# Patient Record
Sex: FEMALE | Race: WHITE | NOT HISPANIC OR LATINO | Employment: UNEMPLOYED | URBAN - METROPOLITAN AREA
[De-identification: names, ages, dates, MRNs, and addresses within clinical notes are randomized per-mention and may not be internally consistent; named-entity substitution may affect disease eponyms.]

---

## 2017-01-09 ENCOUNTER — PATIENT MESSAGE (OUTPATIENT)
Dept: RHEUMATOLOGY | Facility: CLINIC | Age: 47
End: 2017-01-09

## 2017-01-19 ENCOUNTER — PATIENT MESSAGE (OUTPATIENT)
Dept: RHEUMATOLOGY | Facility: CLINIC | Age: 47
End: 2017-01-19

## 2017-01-30 ENCOUNTER — HOSPITAL ENCOUNTER (OUTPATIENT)
Dept: RADIOLOGY | Facility: HOSPITAL | Age: 47
Discharge: HOME | End: 2017-01-30
Attending: PODIATRIST
Payer: COMMERCIAL

## 2017-01-30 ENCOUNTER — TELEPHONE (OUTPATIENT)
Dept: PODIATRY | Facility: CLINIC | Age: 47
End: 2017-01-30

## 2017-01-30 ENCOUNTER — OFFICE VISIT (OUTPATIENT)
Dept: PODIATRY | Facility: CLINIC | Age: 47
End: 2017-01-30
Payer: COMMERCIAL

## 2017-01-30 VITALS — BODY MASS INDEX: 34.65 KG/M2 | HEIGHT: 63 IN | WEIGHT: 195.56 LBS

## 2017-01-30 DIAGNOSIS — M77.8 EXTENSOR TENDINITIS OF FOOT: ICD-10-CM

## 2017-01-30 DIAGNOSIS — M79.671 FOOT PAIN, RIGHT: ICD-10-CM

## 2017-01-30 DIAGNOSIS — M72.2 PLANTAR FASCIITIS: ICD-10-CM

## 2017-01-30 DIAGNOSIS — M79.671 FOOT PAIN, RIGHT: Primary | ICD-10-CM

## 2017-01-30 DIAGNOSIS — M21.6X9 EQUINUS DEFORMITY OF FOOT: ICD-10-CM

## 2017-01-30 DIAGNOSIS — M84.374K: ICD-10-CM

## 2017-01-30 PROCEDURE — 99204 OFFICE O/P NEW MOD 45 MIN: CPT | Mod: S$GLB,,, | Performed by: PODIATRIST

## 2017-01-30 PROCEDURE — 73630 X-RAY EXAM OF FOOT: CPT | Mod: 26,RT,, | Performed by: RADIOLOGY

## 2017-01-30 PROCEDURE — 73630 X-RAY EXAM OF FOOT: CPT | Mod: TC,PO,RT

## 2017-01-30 PROCEDURE — 99999 PR PBB SHADOW E&M-EST. PATIENT-LVL III: CPT | Mod: PBBFAC,,, | Performed by: PODIATRIST

## 2017-01-30 PROCEDURE — 1159F MED LIST DOCD IN RCRD: CPT | Mod: S$GLB,,, | Performed by: PODIATRIST

## 2017-01-30 RX ORDER — TOPIRAMATE 25 MG/1
TABLET ORAL
Qty: 180 TABLET | Refills: 0 | Status: SHIPPED | OUTPATIENT
Start: 2017-01-30 | End: 2017-07-19 | Stop reason: SDUPTHER

## 2017-01-30 RX ORDER — NAPROXEN 500 MG/1
500 TABLET ORAL 2 TIMES DAILY
Qty: 30 TABLET | Refills: 1 | Status: ON HOLD | OUTPATIENT
Start: 2017-01-30 | End: 2017-04-20 | Stop reason: HOSPADM

## 2017-01-30 RX ORDER — METHYLPREDNISOLONE 4 MG/1
TABLET ORAL
Qty: 1 PACKAGE | Refills: 0 | Status: SHIPPED | OUTPATIENT
Start: 2017-01-30 | End: 2017-02-16 | Stop reason: ALTCHOICE

## 2017-01-30 NOTE — MR AVS SNAPSHOT
[facility] Podiatry  [address]  [facility] [address]  Phone: [phone]                  Kayla Small   2017 7:00 AM   Office Visit    Description:  Female : 1970   Provider:  Darrell Alvarado DPM   Department:  [place] [facility] Podiatry           Reason for Visit     Foot Pain           Diagnoses this Visit        Comments    Foot pain, right    -  Primary     Extensor tendinitis of foot         Plantar fasciitis         Equinus deformity of foot                To Do List           Future Appointments        Provider Department Dept Phone    3/13/2017 7:00 AM Darrell Alvarado DPM [place] [facility] Pod[facility] [phone]    3/28/2017 8:00 AM Luke Bianchi MD [facility] Rheumatology [phone]      Goals (5 Years of Data)     None      Follow-Up and Disposition     Return in about 6 weeks (around 3/13/2017).       These Medications        Disp Refills Start End    naproxen (NAPROSYN) 500 MG tablet 30 tablet 1 2017     Take 1 tablet (500 mg total) by mouth 2 (two) times daily. - Oral    Pharmacy: [facility] [address] [place] [address] AT [facility] [address] Ph #: [phone]       methylPREDNISolone (MEDROL DOSEPACK) 4 mg tablet 1 Package 0 2017    use as directed    Pharmacy: [facility] [address] [place] [address] AT [facility] [address] [facility] [address] Ph #: [phone]         [facility] On [facility]     [facility] Care [facility] -  Assistance  Registered nurses in the [facility] provide clinical advisement, health education, appointment booking, and other advisory services.  Call for this free service at [phone].             Medications           Message regarding Medications     Verify the changes and/or additions to your medication regime listed below are the same as discussed with your clinician today.  If any of these changes or additions are incorrect, please notify your healthcare provider.         START taking these NEW medications        Refills    naproxen (NAPROSYN) 500 MG tablet 1    Sig: Take 1 tablet (500 mg total) by mouth 2 (two) times daily.    Class: Normal    Route: Oral    methylPREDNISolone (MEDROL DOSEPACK) 4 mg tablet 0    Sig: use as directed    Class: Normal           Verify that the below list of medications is an accurate representation of the medications you are currently taking.  If none reported, the list may be blank. If incorrect, please contact your healthcare provider. Carry this list with you in case of emergency.           Current Medications     ACTHAR H.P. 80 unit/mL injectable gel INJECT 80 UNITS (1ML) SUBCUTANEOUSLY 2 TIMES A WEEK FOR 12 WEEKS    alprazolam (XANAX) 0.5 MG tablet TAKE 1 TABLET BY MOUTH EVERY DAY    armodafinil (NUVIGIL) 250 mg tablet TK 1 T PO  QD    baclofen (LIORESAL) 20 MG tablet 1 tab po TID prn muscle spasm    buPROPion (WELLBUTRIN SR) 150 MG TBSR 12 hr tablet     celecoxib (CELEBREX) 200 MG capsule Take 1 capsule (200 mg total) by mouth once daily.    CONTRAVE 8-90 mg TbSR     cyanocobalamin 1,000 mcg/mL injection Inject 1 mL (1,000 mcg total) into the skin every 7 days.    donepezil (ARICEPT) 5 MG tablet TAKE 1 TABLET(5 MG) BY MOUTH EVERY EVENING    DUEXIS 800-26.6 mg Tab Take 1 tablet by mouth Daily.    esomeprazole (NEXIUM) 40 MG capsule Take 1 capsule (40 mg total) by mouth as needed.    esterified estrogens (MENEST) 1.25 mg Tab Take 1 tablet by mouth once daily.    fluconazole (DIFLUCAN) 200 MG Tab TAKE ONE TABLET BY MOUTH EVERY DAY    folic acid (FOLVITE) 1 MG tablet TAKE 1 TABLET(1 MG) BY MOUTH EVERY DAY    furosemide (LASIX) 20 MG tablet Take 1 tablet (20 mg total) by mouth 2 (two) times daily.    gabapentin (NEURONTIN) 300 MG capsule TAKE 1 CAPSULE(300 MG) BY MOUTH THREE TIMES DAILY    hydrocodone-acetaminophen 10-325mg (NORCO)  mg Tab Take 1 tablet by mouth every 6 (six) hours as needed.    hydrOXYzine pamoate (VISTARIL) 25 MG Cap Take by  "mouth. 1 Capsule Oral As directed.  as needed    levothyroxine (SYNTHROID) 150 MCG tablet BRAND ONLY PER PT take one po daily except Saturday and Sunday 175 mg    liothyronine (CYTOMEL) 5 MCG Tab Take 25 mcg by mouth before breakfast.    meloxicam (MOBIC) 15 MG tablet     methotrexate 2.5 MG Tab Take 5 tablets (12.5 mg total) by mouth every Wednesday.    methyltestosterone 10 mg Tab Take 1/4 tab daily. This is 2.5 mg daily.    metOLazone (ZAROXOLYN) 2.5 MG tablet     ondansetron (ZOFRAN) 8 MG tablet TAKE 1 TABLET BY MOUTH EVERY 12 HOURS AS NEEDED FOR NAUSEA    PLEGRIDY 125 mcg/0.5 mL PnIj     potassium chloride (K-TAB) 20 mEq Take 1 tablet (20 mEq total) by mouth 2 (two) times daily.    pramipexole (MIRAPEX) 0.5 MG tablet TAKE 1 TO 3 TABLETS BY MOUTH AT BEDTIME TO TREAT RESTLESS LEG    pramipexole (MIRAPEX) 0.5 MG tablet TAKE 1 TO 3 TABLETS BY MOUTH AT BEDTIME TO TREAT RESTLESS LEG    ranitidine (ZANTAC) 150 MG tablet     RESTASIS 0.05 % ophthalmic emulsion     ropinirole (REQUIP) 1 MG tablet TK 1 T PO QD AT BEDTIME. START WITH 1/2 T AND. INCREASE TO 1 T AFTER 3 DAYS FOR 30 DAYS    sumatriptan (IMITREX) 100 MG tablet TAKE 1 TABLET BY MOUTH AS NEEDED FOR MIGRAINE; NO MORE THAN 200MG IN 24 HOURS    SYNTHROID 175 mcg tablet Take one po every Saturday and Sunday BRAND NAME ONLY    temazepam (RESTORIL) 30 mg capsule Take 30 mg by mouth nightly as needed for Insomnia.    topiramate (TOPAMAX) 25 MG tablet TAKE 1 TABLET(25 MG) BY MOUTH TWICE DAILY    amoxicillin-clavulanate 875-125mg (AUGMENTIN) 875-125 mg per tablet     methylPREDNISolone (MEDROL DOSEPACK) 4 mg tablet use as directed    naproxen (NAPROSYN) 500 MG tablet Take 1 tablet (500 mg total) by mouth 2 (two) times daily.           Clinical Reference Information           Vital Signs - Last Recorded  Most recent update: 1/30/2017  7:25 AM by Magi Garrison LPN    Ht Wt BMI          5' 3" (1.6 m) 88.7 kg (195 lb 8.8 oz) 34.64 kg/m2        Allergies as of 1/30/2017  "    No Known Allergies      Immunizations Administered on Date of Encounter - 1/30/2017     None      Orders Placed During Today's Visit     Future Labs/Procedures Expected by Expires    X-Ray Foot Complete Right  1/30/2017 1/30/2018      Instructions    - Given verbal and written instructions regarding stretching exercises to help reduce equinus deformity.    - Recommended wearing supportive shoes and insoles to offload the affected heel.    - Insoles: Sof Sole Fit Series High Arch (found on amazon.com).    - Rest and ice the affected heel and foot up to 20 minutes daily.      - Instructed to take naproxen and medrol dose pack for pain.    - Avoid barefoot walking, flats, and slippers as this will exacerbate symptoms.    - Avoid squatting, stooping, and running as these activities will exacerbate symptoms.

## 2017-01-30 NOTE — PROGRESS NOTES
Subjective:      Patient ID: Kayla Small is a 46 y.o. female.    Chief Complaint: Foot Pain (Bilateral foot pain, Hx. Fx to Rt foot)  Patient presents to clinic with the complaint of pain in the Rt. Foot over 8 months ago.  States that she was previously diagnosed with a hairline fracture of the 2nd metatarsal at that time.  States that she was instructed to wear a postoperative boot to offload the site, however, states this was short lived due to difficulty walking with the boot.  States the foot continues to emit sharp pain with ambulation that she rates as an 8/10.  States symptoms are alleviated only with rest.  Has not attempted any additional self treatment.  Also, relates having more recent pain in the Lt. Plantar proximal arch and heel.  States symptoms are sharp and exacerbated with weight bearing.  Symptoms are alleviated with rest.  Feels that she is compensating with her weight being shifted to the Lt. On account of current Rt. Foot pain.  Denies any additional pedal complaints.      Past Medical History   Diagnosis Date    Anemia     Cancer      Thyroid    Multiple sclerosis     Psoriatic arthritis     Thyroid cancer        Past Surgical History   Procedure Laterality Date    Breast augmentation       R and L breast  removed    Thyroid surgery      Tumor removed behind l ear       section, classic       x 2    Colonoscopy      Hysterectomy       TVH with BSO, anemia postoperatively       Family History   Problem Relation Age of Onset    Heart disease Mother     Diabetes Brother     Breast cancer Neg Hx     Colon cancer Neg Hx        Social History     Social History    Marital status:      Spouse name: N/A    Number of children: N/A    Years of education: N/A     Social History Main Topics    Smoking status: Never Smoker    Smokeless tobacco: Never Used    Alcohol use Yes      Comment: socially    Drug use: No    Sexual activity: Yes     Partners: Male      Birth control/ protection: Surgical     Other Topics Concern    None     Social History Narrative       Current Outpatient Prescriptions   Medication Sig Dispense Refill    ACTHAR H.P. 80 unit/mL injectable gel INJECT 80 UNITS (1ML) SUBCUTANEOUSLY 2 TIMES A WEEK FOR 12 WEEKS 10 mL 5    alprazolam (XANAX) 0.5 MG tablet TAKE 1 TABLET BY MOUTH EVERY DAY (Patient taking differently: TAKE 1 TABLET BY MOUTH THREE TIMES DAILY AS NEEDED) 30 tablet 0    armodafinil (NUVIGIL) 250 mg tablet TK 1 T PO  QD  5    baclofen (LIORESAL) 20 MG tablet 1 tab po TID prn muscle spasm 90 tablet 5    buPROPion (WELLBUTRIN SR) 150 MG TBSR 12 hr tablet       celecoxib (CELEBREX) 200 MG capsule Take 1 capsule (200 mg total) by mouth once daily. 30 capsule 6    CONTRAVE 8-90 mg TbSR       cyanocobalamin 1,000 mcg/mL injection Inject 1 mL (1,000 mcg total) into the skin every 7 days. 10 mL 3    donepezil (ARICEPT) 5 MG tablet TAKE 1 TABLET(5 MG) BY MOUTH EVERY EVENING 30 tablet 3    DUEXIS 800-26.6 mg Tab Take 1 tablet by mouth Daily.      esomeprazole (NEXIUM) 40 MG capsule Take 1 capsule (40 mg total) by mouth as needed. 30 capsule 5    esterified estrogens (MENEST) 1.25 mg Tab Take 1 tablet by mouth once daily. 30 each 6    fluconazole (DIFLUCAN) 200 MG Tab TAKE ONE TABLET BY MOUTH EVERY DAY 10 tablet 3    folic acid (FOLVITE) 1 MG tablet TAKE 1 TABLET(1 MG) BY MOUTH EVERY DAY 90 tablet 2    furosemide (LASIX) 20 MG tablet Take 1 tablet (20 mg total) by mouth 2 (two) times daily. 60 tablet 3    gabapentin (NEURONTIN) 300 MG capsule TAKE 1 CAPSULE(300 MG) BY MOUTH THREE TIMES DAILY 270 capsule 6    hydrocodone-acetaminophen 10-325mg (NORCO)  mg Tab Take 1 tablet by mouth every 6 (six) hours as needed. 12 tablet 0    hydrOXYzine pamoate (VISTARIL) 25 MG Cap Take by mouth. 1 Capsule Oral As directed.  as needed      levothyroxine (SYNTHROID) 150 MCG tablet BRAND ONLY PER PT take one po daily except Saturday and Sunday  175 mg      liothyronine (CYTOMEL) 5 MCG Tab Take 25 mcg by mouth before breakfast.      meloxicam (MOBIC) 15 MG tablet       methotrexate 2.5 MG Tab Take 5 tablets (12.5 mg total) by mouth every Wednesday. 20 tablet 5    methyltestosterone 10 mg Tab Take 1/4 tab daily. This is 2.5 mg daily. 30 each 5    metOLazone (ZAROXOLYN) 2.5 MG tablet       ondansetron (ZOFRAN) 8 MG tablet TAKE 1 TABLET BY MOUTH EVERY 12 HOURS AS NEEDED FOR NAUSEA 30 tablet 3    PLEGRIDY 125 mcg/0.5 mL PnIj       potassium chloride (K-TAB) 20 mEq Take 1 tablet (20 mEq total) by mouth 2 (two) times daily. 60 tablet 5    pramipexole (MIRAPEX) 0.5 MG tablet TAKE 1 TO 3 TABLETS BY MOUTH AT BEDTIME TO TREAT RESTLESS LEG 90 tablet 6    pramipexole (MIRAPEX) 0.5 MG tablet TAKE 1 TO 3 TABLETS BY MOUTH AT BEDTIME TO TREAT RESTLESS LEG 90 tablet 0    ranitidine (ZANTAC) 150 MG tablet       RESTASIS 0.05 % ophthalmic emulsion       ropinirole (REQUIP) 1 MG tablet TK 1 T PO QD AT BEDTIME. START WITH 1/2 T AND. INCREASE TO 1 T AFTER 3 DAYS FOR 30 DAYS  5    sumatriptan (IMITREX) 100 MG tablet TAKE 1 TABLET BY MOUTH AS NEEDED FOR MIGRAINE; NO MORE THAN 200MG IN 24 HOURS 18 tablet 6    SYNTHROID 175 mcg tablet Take one po every Saturday and Sunday BRAND NAME ONLY  6    temazepam (RESTORIL) 30 mg capsule Take 30 mg by mouth nightly as needed for Insomnia.      topiramate (TOPAMAX) 25 MG tablet TAKE 1 TABLET(25 MG) BY MOUTH TWICE DAILY 180 tablet 6    amoxicillin-clavulanate 875-125mg (AUGMENTIN) 875-125 mg per tablet       methylPREDNISolone (MEDROL DOSEPACK) 4 mg tablet use as directed 1 Package 0    naproxen (NAPROSYN) 500 MG tablet Take 1 tablet (500 mg total) by mouth 2 (two) times daily. 30 tablet 1     No current facility-administered medications for this visit.        Review of patient's allergies indicates:   Allergen Reactions    No known allergies        Review of Systems   Constitution: Negative for chills and fever.    Cardiovascular: Negative for claudication.   Skin: Negative for color change, dry skin and nail changes.   Musculoskeletal: Positive for joint swelling. Negative for joint pain.   Neurological: Negative for numbness and paresthesias.   Psychiatric/Behavioral: Negative for altered mental status.           Objective:      Physical Exam   Constitutional: She is oriented to person, place, and time. She appears well-developed and well-nourished. No distress.   Cardiovascular:   Pulses:       Dorsalis pedis pulses are 2+ on the right side, and 2+ on the left side.        Posterior tibial pulses are 2+ on the right side, and 2+ on the left side.   CFT <3 seconds bilateral.  Pedal hair growth present bilateral.   No varicosities noted bilateral.  Mild localized edema noted to the Rt. Foot.   Musculoskeletal: She exhibits edema and tenderness.   Muscle strength 5/5 in all muscle groups bilateral.  Mild pain with palpation of the Rt. 2nd extensor tendon as it courses from the midfoot to the base of the 2nd toe.  Pain with active and passive flexion and extension of the toes of the Rt. Foot.  Pain with palpation of the medial calcaneal tubercle of the Lt. Foot.  Bilateral gastrocnemius equinus.  Bilateral pes cavus foot type.  No pain or crepitation with active or passive ROM about all foot and ankle joints.     Neurological: She is alert and oriented to person, place, and time. She has normal strength. No sensory deficit.   Light touch intact bilateral.    Skin: Skin is warm, dry and intact. No abrasion, no bruising, no burn, no ecchymosis, no laceration, no lesion, no petechiae and no rash noted. She is not diaphoretic. No cyanosis or erythema. No pallor. Nails show no clubbing.   Pedal skin has normal turgor, temperature, and texture bilateral.  Toenails x 10 appear normotrophic. Examination of the skin reveals no evidence of significant maceration, rashes, open lesions, suspicious appearing nevi or other concerning  lesions.              Assessment:       Encounter Diagnoses   Name Primary?    Foot pain, right Yes    Extensor tendinitis of foot     Plantar fasciitis     Equinus deformity of foot     Metatarsal stress fracture with nonunion, right          Plan:       Kayla HAYWARD was seen today for foot pain.    Diagnoses and all orders for this visit:    Foot pain, right  -     X-Ray Foot Complete Right; Future    Extensor tendinitis of foot  -     naproxen (NAPROSYN) 500 MG tablet; Take 1 tablet (500 mg total) by mouth 2 (two) times daily.  -     methylPREDNISolone (MEDROL DOSEPACK) 4 mg tablet; use as directed    Plantar fasciitis  -     naproxen (NAPROSYN) 500 MG tablet; Take 1 tablet (500 mg total) by mouth 2 (two) times daily.  -     methylPREDNISolone (MEDROL DOSEPACK) 4 mg tablet; use as directed    Equinus deformity of foot    Metatarsal stress fracture with nonunion, right      I counseled the patient on her conditions, their implications and medical management.    Discussed more supportive insoles for pes cavus foot type.  This will help to address plantar fasciitis on the Lt.    Instructed to begin performing stretching exercises twice daily to reduce the Lt. Equinus.     Advised to take the oral nsaid for bilateral foot pain.    Advised to RICE the Rt. Foot.    Will dispense a postoperative shoe to offload the fracture site.      Advised to ambulate minimally on the Rt. Foot to facilitate fracture healing.    Advised to avoid high impact activities as this could further disrupt fracture site.    RTC pending availability of a bone stimulator.    Darrell Alvarado DPM

## 2017-01-30 NOTE — PATIENT INSTRUCTIONS
- Given verbal and written instructions regarding stretching exercises to help reduce equinus deformity.    - Recommended wearing supportive shoes and insoles to offload the affected heel.    - Insoles: Sof Sole Fit Series High Arch (found on amazon.com).    - Rest and ice the affected heel and foot up to 20 minutes daily.      - Instructed to take naproxen and medrol dose pack for pain.    - Avoid barefoot walking, flats, and slippers as this will exacerbate symptoms.    - Avoid squatting, stooping, and running as these activities will exacerbate symptoms.

## 2017-01-31 ENCOUNTER — PATIENT MESSAGE (OUTPATIENT)
Dept: RHEUMATOLOGY | Facility: CLINIC | Age: 47
End: 2017-01-31

## 2017-02-01 ENCOUNTER — PATIENT MESSAGE (OUTPATIENT)
Dept: PODIATRY | Facility: CLINIC | Age: 47
End: 2017-02-01

## 2017-02-20 DIAGNOSIS — M25.562 LEFT KNEE PAIN: ICD-10-CM

## 2017-02-20 DIAGNOSIS — K21.9 GASTROESOPHAGEAL REFLUX DISEASE, ESOPHAGITIS PRESENCE NOT SPECIFIED: ICD-10-CM

## 2017-02-20 DIAGNOSIS — D64.9 ANEMIA: ICD-10-CM

## 2017-02-20 DIAGNOSIS — G35 MULTIPLE SCLEROSIS: ICD-10-CM

## 2017-02-20 DIAGNOSIS — L40.50 PSORIATIC ARTHRITIS: ICD-10-CM

## 2017-02-20 RX ORDER — FOLIC ACID 1 MG/1
TABLET ORAL
Qty: 30 TABLET | Refills: 0 | Status: SHIPPED | OUTPATIENT
Start: 2017-02-20 | End: 2017-03-30 | Stop reason: SDUPTHER

## 2017-02-20 RX ORDER — FUROSEMIDE 20 MG/1
TABLET ORAL
Qty: 180 TABLET | Refills: 3 | Status: ON HOLD | OUTPATIENT
Start: 2017-02-20 | End: 2017-04-20 | Stop reason: HOSPADM

## 2017-02-20 RX ORDER — GABAPENTIN 300 MG/1
CAPSULE ORAL
Qty: 90 CAPSULE | Refills: 0 | Status: SHIPPED | OUTPATIENT
Start: 2017-02-20 | End: 2017-03-30 | Stop reason: SDUPTHER

## 2017-02-20 RX ORDER — ESOMEPRAZOLE MAGNESIUM 40 MG/1
CAPSULE, DELAYED RELEASE ORAL
Qty: 90 CAPSULE | Refills: 0 | Status: SHIPPED | OUTPATIENT
Start: 2017-02-20 | End: 2017-07-26

## 2017-02-20 RX ORDER — DONEPEZIL HYDROCHLORIDE 5 MG/1
TABLET, FILM COATED ORAL
Qty: 90 TABLET | Refills: 4 | Status: SHIPPED | OUTPATIENT
Start: 2017-02-20 | End: 2018-02-22 | Stop reason: SDUPTHER

## 2017-02-20 RX ORDER — TRIAZOLAM 0.25 MG/1
TABLET ORAL
Qty: 30 TABLET | Refills: 0 | Status: SHIPPED | OUTPATIENT
Start: 2017-02-20 | End: 2017-04-18

## 2017-02-23 RX ORDER — SUMATRIPTAN SUCCINATE 100 MG/1
TABLET ORAL
Qty: 18 TABLET | Refills: 6 | Status: SHIPPED | OUTPATIENT
Start: 2017-02-23 | End: 2017-09-01 | Stop reason: SDUPTHER

## 2017-03-27 DIAGNOSIS — J20.9 ACUTE BRONCHITIS, UNSPECIFIED ORGANISM: ICD-10-CM

## 2017-03-27 RX ORDER — ALBUTEROL SULFATE 90 UG/1
2 AEROSOL, METERED RESPIRATORY (INHALATION) EVERY 4 HOURS PRN
Qty: 1 INHALER | Refills: 3 | Status: SHIPPED | OUTPATIENT
Start: 2017-03-27 | End: 2017-07-19 | Stop reason: SDUPTHER

## 2017-03-28 ENCOUNTER — OFFICE VISIT (OUTPATIENT)
Dept: RHEUMATOLOGY | Facility: CLINIC | Age: 47
End: 2017-03-28
Payer: COMMERCIAL

## 2017-03-28 VITALS
BODY MASS INDEX: 37.42 KG/M2 | WEIGHT: 211.19 LBS | HEIGHT: 63 IN | SYSTOLIC BLOOD PRESSURE: 130 MMHG | HEART RATE: 76 BPM | DIASTOLIC BLOOD PRESSURE: 85 MMHG

## 2017-03-28 DIAGNOSIS — Z51.81 ENCOUNTER FOR METHOTREXATE MONITORING: ICD-10-CM

## 2017-03-28 DIAGNOSIS — G35 OPTIC NEURITIS DUE TO MULTIPLE SCLEROSIS: ICD-10-CM

## 2017-03-28 DIAGNOSIS — Z79.631 ENCOUNTER FOR METHOTREXATE MONITORING: ICD-10-CM

## 2017-03-28 DIAGNOSIS — G35 MULTIPLE SCLEROSIS: Primary | ICD-10-CM

## 2017-03-28 DIAGNOSIS — H46.9 OPTIC NEURITIS DUE TO MULTIPLE SCLEROSIS: ICD-10-CM

## 2017-03-28 DIAGNOSIS — K21.9 GASTROESOPHAGEAL REFLUX DISEASE, ESOPHAGITIS PRESENCE NOT SPECIFIED: ICD-10-CM

## 2017-03-28 PROCEDURE — 96372 THER/PROPH/DIAG INJ SC/IM: CPT | Mod: S$GLB,,, | Performed by: INTERNAL MEDICINE

## 2017-03-28 PROCEDURE — 1160F RVW MEDS BY RX/DR IN RCRD: CPT | Mod: S$GLB,,, | Performed by: INTERNAL MEDICINE

## 2017-03-28 PROCEDURE — 99215 OFFICE O/P EST HI 40 MIN: CPT | Mod: 25,S$GLB,, | Performed by: INTERNAL MEDICINE

## 2017-03-28 PROCEDURE — 99999 PR PBB SHADOW E&M-EST. PATIENT-LVL V: CPT | Mod: PBBFAC,,, | Performed by: INTERNAL MEDICINE

## 2017-03-28 RX ORDER — CYANOCOBALAMIN 1000 UG/ML
1000 INJECTION, SOLUTION INTRAMUSCULAR; SUBCUTANEOUS
Status: COMPLETED | OUTPATIENT
Start: 2017-03-28 | End: 2017-03-28

## 2017-03-28 RX ORDER — KETOROLAC TROMETHAMINE 30 MG/ML
60 INJECTION, SOLUTION INTRAMUSCULAR; INTRAVENOUS
Status: COMPLETED | OUTPATIENT
Start: 2017-03-28 | End: 2017-03-28

## 2017-03-28 RX ADMIN — KETOROLAC TROMETHAMINE 60 MG: 30 INJECTION, SOLUTION INTRAMUSCULAR; INTRAVENOUS at 09:03

## 2017-03-28 RX ADMIN — CYANOCOBALAMIN 1000 MCG: 1000 INJECTION, SOLUTION INTRAMUSCULAR; SUBCUTANEOUS at 09:03

## 2017-03-28 NOTE — MR AVS SNAPSHOT
[facility]  [address] [facility] [address]  [facility] [address]  Phone: [phone]  Fax: [phone]                  Kayla Small   3/28/2017 8:00 AM   Office Visit    Description:  Female : 1970   Provider:  Luke Bianchi MD   Department:  [facility]           Reason for Visit     Disease Management           Diagnoses this Visit        Comments    Multiple sclerosis    -  Primary     Optic neuritis due to multiple sclerosis         Encounter for methotrexate monitoring         Gastroesophageal reflux disease, esophagitis presence not specified                To Do List           Goals (5 Years of Data)     None      [facility]     [facility] -  Assistance  Registered nurses in the [facility] provide clinical advisement, health education, appointment booking, and other advisory services.  Call for this free service at [phone].             Medications           Message regarding Medications     Verify the changes and/or additions to your medication regime listed below are the same as discussed with your clinician today.  If any of these changes or additions are incorrect, please notify your healthcare provider.        These medications were administered today        Dose Freq    ketorolac injection 60 mg 60 mg Clinic/HOD 1 time    Sig: Inject 2 mLs (60 mg total) into the muscle one time.    Class: Normal    Route: Intramuscular    cyanocobalamin injection 1,000 mcg 1,000 mcg Clinic/HOD 1 time    Sig: Inject 1 mL (1,000 mcg total) into the muscle one time.    Class: Normal    Route: Intramuscular      STOP taking these medications     guaifenesin-codeine 100-10 mg/5 ml (TUSSI-ORGANIDIN NR)  mg/5 mL syrup Take 5 mLs by mouth nightly as needed.    vilazodone (VIIBRYD) 10 mg Tab tablet 5mg daily x 1 week, then increase to 10mg daily x 1 week, then increase to 20mg per day.           Verify that the below list of medications is  an accurate representation of the medications you are currently taking.  If none reported, the list may be blank. If incorrect, please contact your healthcare provider. Carry this list with you in case of emergency.           Current Medications     ACTHAR H.P. 80 unit/mL injectable gel INJECT 80 UNITS (1ML) SUBCUTANEOUSLY 2 TIMES A WEEK FOR 12 WEEKS    albuterol (PROAIR HFA) 90 mcg/actuation inhaler Inhale 2 puffs into the lungs every 4 (four) hours as needed for Shortness of Breath. Rescue    alprazolam (XANAX) 0.5 MG tablet TAKE 1 TABLET BY MOUTH EVERY DAY    armodafinil (NUVIGIL) 250 mg tablet TK 1 T PO  QD    baclofen (LIORESAL) 20 MG tablet 1 tab po TID prn muscle spasm    buPROPion (WELLBUTRIN SR) 150 MG TBSR 12 hr tablet     bupropion HCl, smoking deter, 150 mg TbSR TAKE 1 TABLET(150 MG) BY MOUTH TWICE DAILY    celecoxib (CELEBREX) 200 MG capsule Take 1 capsule (200 mg total) by mouth once daily.    cyanocobalamin 1,000 mcg/mL injection Inject 1 mL (1,000 mcg total) into the skin every 7 days.    donepezil (ARICEPT) 5 MG tablet TAKE 1 TABLET(5 MG) BY MOUTH EVERY EVENING    donepezil (ARICEPT) 5 MG tablet TAKE 1 TABLET(5 MG) BY MOUTH EVERY EVENING    DUEXIS 800-26.6 mg Tab Take 1 tablet by mouth Daily.    esomeprazole (NEXIUM) 40 MG capsule TAKE 1 CAPSULE BY MOUTH DAILY AS NEEDED    esterified estrogens (MENEST) 1.25 mg Tab Take 1 tablet by mouth once daily.    fluconazole (DIFLUCAN) 200 MG Tab TAKE ONE TABLET BY MOUTH EVERY DAY    fluticasone (FLONASE) 50 mcg/actuation nasal spray SHAKE LIQUID AND USE 1 SPRAY IN EACH NOSTRIL EVERY DAY    folic acid (FOLVITE) 1 MG tablet TAKE 1 TABLET(1 MG) BY MOUTH EVERY DAY    folic acid (FOLVITE) 1 MG tablet TAKE 1 TABLET(1 MG) BY MOUTH EVERY DAY    furosemide (LASIX) 20 MG tablet TAKE 1 TABLET(20 MG) BY MOUTH TWICE DAILY    gabapentin (NEURONTIN) 300 MG capsule TAKE 1 CAPSULE(300 MG) BY MOUTH THREE TIMES DAILY    gabapentin (NEURONTIN) 300 MG capsule TAKE 1 CAPSULE(300  "MG) BY MOUTH THREE TIMES DAILY    guaifenesin (MUCINEX) 600 mg 12 hr tablet Take 1 tablet (600 mg total) by mouth 2 (two) times daily.    hydrocodone-acetaminophen 10-325mg (NORCO)  mg Tab Take 1 tablet by mouth every 6 (six) hours as needed.    levothyroxine (SYNTHROID, LEVOTHROID) 175 MCG tablet Take 175 mcg by mouth once daily.    liothyronine (CYTOMEL) 5 MCG Tab Take 25 mcg by mouth before breakfast.    meloxicam (MOBIC) 15 MG tablet     methotrexate 2.5 MG Tab Take 5 tablets (12.5 mg total) by mouth every Wednesday.    methyltestosterone 10 mg Tab Take 1/4 tab daily. This is 2.5 mg daily.    metOLazone (ZAROXOLYN) 2.5 MG tablet     naproxen (NAPROSYN) 500 MG tablet Take 1 tablet (500 mg total) by mouth 2 (two) times daily.    ondansetron (ZOFRAN-ODT) 4 MG TbDL Take 1 tablet (4 mg total) by mouth every 6 (six) hours as needed.    PLEGRIDY 125 mcg/0.5 mL PnIj     potassium chloride (K-TAB) 20 mEq Take 1 tablet (20 mEq total) by mouth 2 (two) times daily.    pramipexole (MIRAPEX) 0.5 MG tablet TAKE 1 TO 3 TABLETS BY MOUTH AT BEDTIME TO TREAT RESTLESS LEG    ranitidine (ZANTAC) 150 MG tablet     RESTASIS 0.05 % ophthalmic emulsion     ropinirole (REQUIP) 1 MG tablet TK 1 T PO QD AT BEDTIME. START WITH 1/2 T AND. INCREASE TO 1 T AFTER 3 DAYS FOR 30 DAYS    sumatriptan (IMITREX) 100 MG tablet TAKE 1 TABLET BY MOUTH AS NEEDED FOR MIGRAINE; NO MORE THAN 200MG IN 24 HOURS    SYNTHROID 200 mcg tablet     temazepam (RESTORIL) 30 mg capsule Take 30 mg by mouth nightly as needed for Insomnia.    topiramate (TOPAMAX) 25 MG tablet TAKE 1 TABLET(25 MG) BY MOUTH TWICE DAILY    triazolam (HALCION) 0.25 MG Tab TAKE 1 TABLET BY MOUTH NIGHTLY AS NEEDED           Clinical Reference Information           Your Vitals Were     BP Pulse Height Weight BMI    130/85 76 5' 3" (1.6 m) 95.8 kg (211 lb 3.2 oz) 37.41 kg/m2      Blood Pressure          Most Recent Value    BP  130/85      Allergies as of 3/28/2017     No Known Allergies "      Immunizations Administered on Date of Encounter - 3/28/2017     None      Orders Placed During Today's Visit     Future Labs/Procedures Expected by Expires    Anti Sm/RNP Antibody  3/28/2017 5/27/2018    Anti-DNA antibody, double-stranded  3/28/2017 5/27/2018    Anti-Histone Antibody  3/28/2017 5/27/2018    Anti-scleroderma antibody  3/28/2017 5/27/2018    Anti-Smith antibody  3/28/2017 5/27/2018    Sjogrens syndrome-A extractable nuclear antibody  3/28/2017 5/27/2018    Sjogrens syndrome-B extractable nuclear antibody  3/28/2017 5/27/2018      Instructions    Acthar gel decrease to every 1/2 cc 40 mg every 3 days x 1 week  Then 1/2 cc once a week x 2 weeks then stop    If you fair go daily x 2 weeks, then every 3 days x 2 weeks then 1/2 cc every 3 days x 1 week then 1/2 cc 40 mg 1 a week x  1 week then stop       Language Assistance Services     ATTENTION: Language assistance services are available, free of charge. Please call [phone].      ATENCIÓN: Si son long, tiene a machado disposición servicios gratuitos de asistencia lingüística. Llame al [phone].     JOHNSON Ý: N?u b?n nói Ti?ng Vi?t, có các d?ch v? h? tr? ngôn ng? mi?n phí dành cho b?n. G?i s? [phone].         [facility] complies with applicable Federal civil rights laws and does not discriminate on the basis of race, color, national origin, age, disability, or sex.

## 2017-03-28 NOTE — PATIENT INSTRUCTIONS
Acthar gel decrease to every 1/2 cc 40 mg every 3 days x 1 week  Then 1/2 cc once a week x 2 weeks then stop    If you fair go daily x 2 weeks, then every 3 days x 2 weeks then 1/2 cc every 3 days x 1 week then 1/2 cc 40 mg 1 a week x  1 week then stop

## 2017-03-28 NOTE — PROGRESS NOTES
Subjective:       Patient ID: Kayla Small is a 46 y.o. female.    Chief Complaint: Disease Management    HPI Comments: Follow up:She had a flares of optic neuritis and  took acthar and it helped flare , Having MS vision flares, also , psoiatic flares, she cant take MTX, IMURAN, Humira, enbrel, and has a history of Thyroid papillary cancer. She has seen a new neurologist. Patient complains of arthralgias and myalgias for which has been present for a few years. Pain is located in multiple joints, both shoulder(s), both elbow(s), both wrist(s), both MCP(s): 1st, 2nd, 3rd, 4th and 5th, both PIP(s): 1st, 2nd, 3rd, 4th and 5th, both DIP(s): 1st and 2nd, both hip(s), both knee(s) and both MTP(s): 1st, 2nd, 3rd, 4th and 5th, is described as aching, pulsating, shooting and throbbing, and is constant, moderate .  Associated symptoms include: crepitation, decreased range of motion, edema, effusion, tenderness and warmth.  On Rebif,          Psoriatic Arthritis    The disease course has been fluctuating.     She complains of joint swelling. Associated symptoms include fatigue and myalgias. She complains of lasting between 30 and 60 minutes after awakening.    Treatments tried: mtx, humira, enbrel.     Review of Systems   Constitutional: Positive for activity change and fatigue. Negative for appetite change, chills, diaphoresis and unexpected weight change.   HENT: Negative for congestion, dental problem, ear discharge, ear pain, facial swelling, mouth sores, nosebleeds, postnasal drip, rhinorrhea, sinus pressure, sneezing, sore throat, tinnitus and voice change.    Eyes: Negative for photophobia, pain, discharge, redness and itching.   Respiratory: Negative for apnea, cough, chest tightness, shortness of breath and wheezing.    Cardiovascular: Positive for leg swelling. Negative for chest pain and palpitations.   Gastrointestinal: Negative for abdominal distention, abdominal pain, constipation, diarrhea, nausea and vomiting.  "  Endocrine: Negative for cold intolerance, heat intolerance, polydipsia and polyuria.   Genitourinary: Negative for decreased urine volume, difficulty urinating, flank pain, frequency, hematuria and urgency.   Musculoskeletal: Positive for arthralgias, back pain, gait problem, joint swelling, myalgias, neck pain and neck stiffness.   Skin: Negative for pallor, rash and wound.   Allergic/Immunologic: Negative for immunocompromised state.   Neurological: Negative for dizziness, tremors, weakness and numbness.   Hematological: Negative for adenopathy. Does not bruise/bleed easily.   Psychiatric/Behavioral: Negative for sleep disturbance. The patient is not nervous/anxious.          Objective:     /85  Pulse 76  Ht 5' 3" (1.6 m)  Wt 95.8 kg (211 lb 3.2 oz)  BMI 37.41 kg/m2     Physical Exam   Nursing note and vitals reviewed.  Constitutional: She is oriented to person, place, and time. She appears distressed.   HENT:   Head: Normocephalic and atraumatic.   Mouth/Throat: Oropharynx is clear and moist.   Eyes: EOM are normal. Pupils are equal, round, and reactive to light.   Neck: Neck supple. No thyromegaly present.   Cardiovascular: Normal rate, regular rhythm and normal heart sounds.  Exam reveals no gallop and no friction rub.    No murmur heard.  Pulmonary/Chest: Breath sounds normal. She has no wheezes. She has no rales. She exhibits no tenderness.   Abdominal: There is no tenderness. There is no rebound and no guarding.       Right Side Rheumatological Exam     Examination finds the elbow normal.    The patient is tender to palpation of the shoulder, wrist, knee, 1st PIP, 1st MCP, 2nd PIP, 2nd MCP, 3rd PIP, 3rd MCP, 4th PIP, 4th MCP, 5th PIP and 5th MCP    She has swelling of the 1st PIP, 1st MCP, 2nd PIP, 2nd MCP, 3rd PIP, 3rd MCP, 4th PIP, 4th MCP, 5th PIP and 5th MCP    Shoulder Exam   Tenderness Location: no tenderness    Range of Motion   Active Abduction: abnormal   Adduction: abnormal  Sensation: " normal    Knee Exam   Patellofemoral Crepitus: positive  Effusion: negative  Sensation: normal    Hip Exam   Tenderness Location: posterior  Sensation: normal    Elbow/Wrist Exam   Tenderness Location: no tenderness  Sensation: normal    Left Side Rheumatological Exam     Examination finds the elbow normal.    The patient is tender to palpation of the shoulder, wrist, knee, 1st PIP, 1st MCP, 2nd PIP, 2nd MCP, 3rd PIP, 3rd MCP, 4th PIP, 4th MCP, 5th PIP and 5th MCP.    She has swelling of the 1st PIP, 1st MCP, 2nd PIP, 2nd MCP, 3rd PIP, 3rd MCP, 4th PIP, 4th MCP, 5th PIP and 5th MCP    Shoulder Exam   Tenderness Location: no tenderness    Range of Motion   Active Abduction: abnormal   Sensation: normal    Knee Exam     Patellofemoral Crepitus: positive  Effusion: negative  Sensation: normal    Hip Exam   Tenderness Location: posterior  Sensation: normal    Elbow/Wrist Exam   Sensation: normal      Back/Neck Exam   General Inspection   Gait: normal         Lymphadenopathy:     She has no cervical adenopathy.   Neurological: She is alert and oriented to person, place, and time. Gait normal.   Skin: No rash noted. No erythema. No pallor.     Psychiatric: Mood and affect normal.   Musculoskeletal: She exhibits edema, tenderness and deformity.           Results for orders placed or performed in visit on 03/27/17   Comprehensive metabolic panel   Result Value Ref Range    Sodium 141 136 - 145 mmol/L    Potassium 3.6 3.5 - 5.1 mmol/L    Chloride 104 95 - 110 mmol/L    CO2 29 22 - 31 mmol/L    Glucose 61 (L) 70 - 110 mg/dL    BUN, Bld 12 7 - 18 mg/dL    Creatinine 0.78 0.50 - 1.40 mg/dL    Calcium 9.7 8.4 - 10.2 mg/dL    Total Protein 6.3 6.0 - 8.4 g/dL    Albumin 3.8 3.5 - 5.2 g/dL    Total Bilirubin 0.5 0.2 - 1.3 mg/dL    Alkaline Phosphatase 81 38 - 145 U/L    AST 23 14 - 36 U/L    ALT 36 10 - 44 U/L    Anion Gap 8 8 - 16 mmol/L    eGFR if African American >60 >60 mL/min/1.73 m^2    eGFR if non African American >60 >60  mL/min/1.73 m^2   CK   Result Value Ref Range    CPK 71 55 - 170 U/L   C-reactive protein   Result Value Ref Range    CRP 1.30 (H) 0.00 - 0.90 mg/dL   Sedimentation rate, manual   Result Value Ref Range    Sed Rate 9 0 - 19 mm/Hr     [facility] Miscellaneous Test 1   SEE NOTE  Comments: Test name                    Result Flag  Units  RefIntvl   ------------------------------------------------------------   SILVIANO Virus Source               Serum   SILVIANO Virus by PCR   Not Detected                         MADALYN IgG by IFA <1:40   <1:40  Comments: <1:40   INTERPRETIVE INFORMATION: MADALYN by IFA, IgG   Anti-nuclear antibodies (MADALYN) are seen in a variety of     Narrative   Exam: DEXA scan 10/07/2016 at 1024    Indication: Total hysterectomy, thyroid cancer    Comparison: None are currently available on pacs.    Findings: Frax measurement left femur major osteoporotic fracture 10 year probability of 4.6 percent and hip fracture 0.3percent.    Spine bone mineral density measurement of 1.137 g per centimeter squared with T score of -0.4and Z score of -0.4.  Femur bone mineral density measurement of 1.005 g per centimeter squared with T score 0.0 and Z score of 0.3.   Impression    T-scores corresponding with normaloverall category.  Fracture risk is low.       Electronically signed by: BHARGAVI MATOS MD  Date: 10/07/16  Time: 10:40          Assessment:     Encounter Diagnoses   Name Primary?    Multiple sclerosis Yes    Optic neuritis due to multiple sclerosis     Encounter for methotrexate monitoring     Gastroesophageal reflux disease, esophagitis presence not specified         Plan:     Multiple sclerosis  -     Anti Sm/RNP Antibody; Future; Expected date: 3/28/17  -     Anti-DNA antibody, double-stranded; Future; Expected date: 3/28/17  -     Anti-Histone Antibody; Future; Expected date: 3/28/17  -     Anti-scleroderma antibody; Future; Expected date: 3/28/17  -     Anti-Smith antibody; Future; Expected date: 3/28/17  -     Sjogrens  syndrome-A extractable nuclear antibody; Future; Expected date: 3/28/17  -     Sjogrens syndrome-B extractable nuclear antibody; Future; Expected date: 3/28/17  -     ketorolac injection 60 mg; Inject 2 mLs (60 mg total) into the muscle one time.  -     cyanocobalamin injection 1,000 mcg; Inject 1 mL (1,000 mcg total) into the muscle one time.    Optic neuritis due to multiple sclerosis  -     Anti Sm/RNP Antibody; Future; Expected date: 3/28/17  -     Anti-DNA antibody, double-stranded; Future; Expected date: 3/28/17  -     Anti-Histone Antibody; Future; Expected date: 3/28/17  -     Anti-scleroderma antibody; Future; Expected date: 3/28/17  -     Anti-Smith antibody; Future; Expected date: 3/28/17  -     Sjogrens syndrome-A extractable nuclear antibody; Future; Expected date: 3/28/17  -     Sjogrens syndrome-B extractable nuclear antibody; Future; Expected date: 3/28/17  -     ketorolac injection 60 mg; Inject 2 mLs (60 mg total) into the muscle one time.  -     cyanocobalamin injection 1,000 mcg; Inject 1 mL (1,000 mcg total) into the muscle one time.    Encounter for methotrexate monitoring  -     Anti Sm/RNP Antibody; Future; Expected date: 3/28/17  -     Anti-DNA antibody, double-stranded; Future; Expected date: 3/28/17  -     Anti-Histone Antibody; Future; Expected date: 3/28/17  -     Anti-scleroderma antibody; Future; Expected date: 3/28/17  -     Anti-Smith antibody; Future; Expected date: 3/28/17  -     Sjogrens syndrome-A extractable nuclear antibody; Future; Expected date: 3/28/17  -     Sjogrens syndrome-B extractable nuclear antibody; Future; Expected date: 3/28/17  -     ketorolac injection 60 mg; Inject 2 mLs (60 mg total) into the muscle one time.  -     cyanocobalamin injection 1,000 mcg; Inject 1 mL (1,000 mcg total) into the muscle one time.    Gastroesophageal reflux disease, esophagitis presence not specified  -     Anti Sm/RNP Antibody; Future; Expected date: 3/28/17  -     Anti-DNA antibody,  double-stranded; Future; Expected date: 3/28/17  -     Anti-Histone Antibody; Future; Expected date: 3/28/17  -     Anti-scleroderma antibody; Future; Expected date: 3/28/17  -     Anti-Smith antibody; Future; Expected date: 3/28/17  -     Sjogrens syndrome-A extractable nuclear antibody; Future; Expected date: 3/28/17  -     Sjogrens syndrome-B extractable nuclear antibody; Future; Expected date: 3/28/17  -     ketorolac injection 60 mg; Inject 2 mLs (60 mg total) into the muscle one time.  -     cyanocobalamin injection 1,000 mcg; Inject 1 mL (1,000 mcg total) into the muscle one time.        she has a  Neg JV virus, pos fatoumata likely due to thyroid but if necessary will order lupus panel. We will wean acthar and use for flares. Acthar is likely slowing healing of foot  Acthar gel decrease to every 1/2 cc 40 mg every 3 days x 1 week  Then 1/2 cc once a week x 2 weeks then stop    If you fair go daily x 2 weeks, then every 3 days x 2 weeks then 1/2 cc every 3 days x 1 week then 1/2 cc 40 mg 1 a week x  1 week then stop

## 2017-03-28 NOTE — PROGRESS NOTES
Administered 1 cc ( 1000 mcg/ml ) of b 12 to the right upper outer gluteal. Informed of s/s to report verbalized understanding. No adverse reactions noted.    Lot # [id number]  Expiration jul 18    Administered 2 cc ( 30 mg/ml ) of toradol to the right upper outer gluteal. Informed of s/s to report verbalized understanding. No adverse reactions noted    Lot # [id number]  Expiration 1 mar 2018

## 2017-03-30 DIAGNOSIS — G35 MS (MULTIPLE SCLEROSIS): Primary | ICD-10-CM

## 2017-03-30 DIAGNOSIS — L40.50 PSA (PSORIATIC ARTHRITIS): ICD-10-CM

## 2017-03-31 RX ORDER — FOLIC ACID 1 MG/1
TABLET ORAL
Qty: 30 TABLET | Refills: 3 | Status: SHIPPED | OUTPATIENT
Start: 2017-03-31 | End: 2017-07-19 | Stop reason: SDUPTHER

## 2017-03-31 RX ORDER — GABAPENTIN 300 MG/1
CAPSULE ORAL
Qty: 90 CAPSULE | Refills: 3 | Status: SHIPPED | OUTPATIENT
Start: 2017-03-31 | End: 2017-05-10 | Stop reason: SDUPTHER

## 2017-03-31 RX ORDER — DONEPEZIL HYDROCHLORIDE 5 MG/1
TABLET, FILM COATED ORAL
Qty: 30 TABLET | Refills: 3 | Status: SHIPPED | OUTPATIENT
Start: 2017-03-31 | End: 2017-04-18 | Stop reason: SDUPTHER

## 2017-04-01 ENCOUNTER — PATIENT MESSAGE (OUTPATIENT)
Dept: OBSTETRICS AND GYNECOLOGY | Facility: CLINIC | Age: 47
End: 2017-04-01

## 2017-04-01 DIAGNOSIS — N95.1 MENOPAUSE SYNDROME: ICD-10-CM

## 2017-04-03 NOTE — TELEPHONE ENCOUNTER
Bone pt requesting prescription refill of methyltestosterone. She said that her prescription has  because it is only good for six months. Saw Dr. Kyle in August for her annual. Allergies and pharmacy are up to date.

## 2017-04-12 ENCOUNTER — PATIENT MESSAGE (OUTPATIENT)
Dept: RHEUMATOLOGY | Facility: CLINIC | Age: 47
End: 2017-04-12

## 2017-04-12 DIAGNOSIS — L40.50 PSORIATIC ARTHRITIS: ICD-10-CM

## 2017-04-12 DIAGNOSIS — G35 MULTIPLE SCLEROSIS: ICD-10-CM

## 2017-04-12 DIAGNOSIS — R11.2 NAUSEA AND VOMITING, INTRACTABILITY OF VOMITING NOT SPECIFIED, UNSPECIFIED VOMITING TYPE: ICD-10-CM

## 2017-04-12 DIAGNOSIS — K21.9 GASTROESOPHAGEAL REFLUX DISEASE, ESOPHAGITIS PRESENCE NOT SPECIFIED: ICD-10-CM

## 2017-04-12 DIAGNOSIS — G35 OPTIC NEURITIS DUE TO MULTIPLE SCLEROSIS: ICD-10-CM

## 2017-04-12 DIAGNOSIS — H46.9 OPTIC NEURITIS DUE TO MULTIPLE SCLEROSIS: ICD-10-CM

## 2017-04-12 RX ORDER — ESTRADIOL 2 MG/1
2 TABLET ORAL DAILY
Qty: 30 TABLET | Refills: 11 | OUTPATIENT
Start: 2017-04-12 | End: 2018-04-12

## 2017-04-13 RX ORDER — FLUCONAZOLE 200 MG/1
200 TABLET ORAL DAILY
Qty: 10 TABLET | Refills: 3 | Status: SHIPPED | OUTPATIENT
Start: 2017-04-13 | End: 2017-08-18 | Stop reason: SDUPTHER

## 2017-04-13 RX ORDER — ONDANSETRON 4 MG/1
4 TABLET, ORALLY DISINTEGRATING ORAL EVERY 6 HOURS PRN
Qty: 12 TABLET | Refills: 3 | Status: SHIPPED | OUTPATIENT
Start: 2017-04-13 | End: 2017-08-18 | Stop reason: SDUPTHER

## 2017-04-13 RX ORDER — ARMODAFINIL 250 MG/1
TABLET ORAL
Qty: 30 TABLET | Refills: 5 | Status: SHIPPED | OUTPATIENT
Start: 2017-04-13 | End: 2017-09-01 | Stop reason: SDUPTHER

## 2017-04-13 RX ORDER — PRAMIPEXOLE DIHYDROCHLORIDE 0.5 MG/1
TABLET ORAL
Qty: 90 TABLET | Refills: 6 | Status: SHIPPED | OUTPATIENT
Start: 2017-04-13 | End: 2017-04-18 | Stop reason: SDUPTHER

## 2017-04-13 RX ORDER — METHOTREXATE 2.5 MG/1
12.5 TABLET ORAL
Qty: 20 TABLET | Refills: 5 | Status: SHIPPED | OUTPATIENT
Start: 2017-04-19 | End: 2017-09-01 | Stop reason: SDUPTHER

## 2017-04-17 ENCOUNTER — OFFICE VISIT (OUTPATIENT)
Dept: PODIATRY | Facility: CLINIC | Age: 47
End: 2017-04-17
Payer: COMMERCIAL

## 2017-04-17 VITALS — HEIGHT: 63 IN | WEIGHT: 202 LBS | BODY MASS INDEX: 35.79 KG/M2

## 2017-04-17 DIAGNOSIS — N95.1 MENOPAUSE SYNDROME: Primary | ICD-10-CM

## 2017-04-17 DIAGNOSIS — M79.671 FOOT PAIN, RIGHT: Primary | ICD-10-CM

## 2017-04-17 DIAGNOSIS — M77.8 EXTENSOR TENDINITIS OF FOOT: ICD-10-CM

## 2017-04-17 DIAGNOSIS — M84.374P: ICD-10-CM

## 2017-04-17 PROCEDURE — 99213 OFFICE O/P EST LOW 20 MIN: CPT | Mod: S$GLB,,, | Performed by: PODIATRIST

## 2017-04-17 PROCEDURE — 99999 PR PBB SHADOW E&M-EST. PATIENT-LVL III: CPT | Mod: PBBFAC,,, | Performed by: PODIATRIST

## 2017-04-17 PROCEDURE — 1160F RVW MEDS BY RX/DR IN RCRD: CPT | Mod: S$GLB,,, | Performed by: PODIATRIST

## 2017-04-17 RX ORDER — LEVOTHYROXINE SODIUM 150 MCG
TABLET ORAL
COMMUNITY
Start: 2017-04-16 | End: 2017-04-18 | Stop reason: DRUGHIGH

## 2017-04-17 RX ORDER — BACLOFEN 10 MG/1
TABLET ORAL
COMMUNITY
Start: 2017-04-04 | End: 2017-04-17 | Stop reason: DRUGHIGH

## 2017-04-17 RX ORDER — POTASSIUM CHLORIDE 750 MG/1
TABLET, EXTENDED RELEASE ORAL
COMMUNITY
Start: 2017-03-30 | End: 2017-04-18 | Stop reason: DRUGHIGH

## 2017-04-17 RX ORDER — ESTRADIOL 2 MG/1
2 TABLET ORAL DAILY
Qty: 30 TABLET | Refills: 11 | Status: ON HOLD | OUTPATIENT
Start: 2017-04-17 | End: 2018-06-20 | Stop reason: HOSPADM

## 2017-04-17 RX ORDER — PANTOPRAZOLE SODIUM 40 MG/1
40 TABLET, DELAYED RELEASE ORAL DAILY
Status: ON HOLD | COMMUNITY
Start: 2017-04-12 | End: 2017-04-20 | Stop reason: HOSPADM

## 2017-04-17 NOTE — PATIENT INSTRUCTIONS
Recommend icing the foot up to 20 minutes daily.    Wear the postoperative boot while ambulatory to allow the fracture site to heal.    Recommend wearing the bone stimulator over the broken portion of bone up to 40 minutes daily.    Compress the foot with an ACE bandage to help with lower extremity swelling.

## 2017-04-17 NOTE — MR AVS SNAPSHOT
[facility]  [address]  [facility] [address]  Phone: [phone]                  Kayla Small   2017 1:00 PM   Office Visit    Description:  Female : 1970   Provider:  Darrell Alvarado DPM   Department:  [place] [facility]           Reason for Visit     Foot Pain           Diagnoses this Visit        Comments    Foot pain, right    -  Primary     Extensor tendinitis of foot         Metatarsal stress fracture, right, with malunion, subsequent encounter                To Do List           Future Appointments        Provider Department Dept Phone    2017 2:30 PM [facility] [id number] [facility] [phone]    2017 8:30 AM Luke Bianchi MD [facility] [phone]      Goals (5 Years of Data)     None      [facility]     [facility] Nurse Care Line -  Assistance  Unless otherwise directed by your provider, please contact [facility], our nurse care line that is available for  assistance.     Registered nurses in the [facility] provide: appointment scheduling, clinical advisement, health education, and other advisory services.  Call: [phone] (toll free)               Medications           Message regarding Medications     Verify the changes and/or additions to your medication regime listed below are the same as discussed with your clinician today.  If any of these changes or additions are incorrect, please notify your healthcare provider.        STOP taking these medications     buPROPion (WELLBUTRIN SR) 150 MG TBSR 12 hr tablet            Verify that the below list of medications is an accurate representation of the medications you are currently taking.  If none reported, the list may be blank. If incorrect, please contact your healthcare provider. Carry this list with you in case of emergency.           Current Medications     ACTHAR H.P. 80 unit/mL injectable gel INJECT 80 UNITS (1ML) SUBCUTANEOUSLY 2  TIMES A WEEK FOR 12 WEEKS    albuterol (PROAIR HFA) 90 mcg/actuation inhaler Inhale 2 puffs into the lungs every 4 (four) hours as needed for Shortness of Breath. Rescue    alprazolam (XANAX) 0.5 MG tablet TAKE 1 TABLET BY MOUTH EVERY DAY    armodafinil (NUVIGIL) 250 mg tablet TK 1 T PO  QD    baclofen (LIORESAL) 20 MG tablet 1 tab po TID prn muscle spasm    bupropion HCl, smoking deter, 150 mg TbSR TAKE 1 TABLET(150 MG) BY MOUTH TWICE DAILY    celecoxib (CELEBREX) 200 MG capsule Take 1 capsule (200 mg total) by mouth once daily.    cyanocobalamin 1,000 mcg/mL injection Inject 1 mL (1,000 mcg total) into the skin every 7 days.    donepezil (ARICEPT) 5 MG tablet TAKE 1 TABLET(5 MG) BY MOUTH EVERY EVENING    donepezil (ARICEPT) 5 MG tablet TAKE 1 TABLET(5 MG) BY MOUTH EVERY EVENING    donepezil (ARICEPT) 5 MG tablet TAKE 1 TABLET(5 MG) BY MOUTH EVERY EVENING    DUEXIS 800-26.6 mg Tab Take 1 tablet by mouth Daily.    esomeprazole (NEXIUM) 40 MG capsule TAKE 1 CAPSULE BY MOUTH DAILY AS NEEDED    esterified estrogens (MENEST) 1.25 mg Tab Take 1 tablet by mouth once daily.    estradiol (ESTRACE) 2 MG tablet Take 1 tablet (2 mg total) by mouth once daily.    fluconazole (DIFLUCAN) 200 MG Tab Take 1 tablet (200 mg total) by mouth once daily.    fluticasone (FLONASE) 50 mcg/actuation nasal spray SHAKE LIQUID AND USE 1 SPRAY IN EACH NOSTRIL EVERY DAY    folic acid (FOLVITE) 1 MG tablet TAKE 1 TABLET(1 MG) BY MOUTH EVERY DAY    furosemide (LASIX) 20 MG tablet TAKE 1 TABLET(20 MG) BY MOUTH TWICE DAILY    gabapentin (NEURONTIN) 300 MG capsule TAKE 1 CAPSULE(300 MG) BY MOUTH THREE TIMES DAILY    guaifenesin (MUCINEX) 600 mg 12 hr tablet Take 1 tablet (600 mg total) by mouth 2 (two) times daily.    hydrocodone-acetaminophen 10-325mg (NORCO)  mg Tab Take 1 tablet by mouth every 6 (six) hours as needed.    levothyroxine (SYNTHROID, LEVOTHROID) 175 MCG tablet Take 175 mcg by mouth once daily.    liothyronine (CYTOMEL) 5 MCG Tab  "Take 25 mcg by mouth before breakfast.    meloxicam (MOBIC) 15 MG tablet     methotrexate 2.5 MG Tab Starting on Apr 19, 2017. Take 5 tablets (12.5 mg total) by mouth every Wednesday.    methyltestosterone 10 mg Tab Take 1/4 tab daily. This is 2.5 mg daily.    metOLazone (ZAROXOLYN) 2.5 MG tablet     naproxen (NAPROSYN) 500 MG tablet Take 1 tablet (500 mg total) by mouth 2 (two) times daily.    ondansetron (ZOFRAN-ODT) 4 MG TbDL Take 1 tablet (4 mg total) by mouth every 6 (six) hours as needed.    pantoprazole (PROTONIX) 40 MG tablet     PLEGRIDY 125 mcg/0.5 mL PnIj     potassium chloride (K-TAB) 20 mEq Take 1 tablet (20 mEq total) by mouth 2 (two) times daily.    potassium chloride (KLOR-CON) 10 MEQ TbSR     pramipexole (MIRAPEX) 0.5 MG tablet TAKE 1 TO 3 TABLETS BY MOUTH AT BEDTIME TO TREAT RESTLESS LEG    pramipexole (MIRAPEX) 0.5 MG tablet TAKE 1 TO 3 TABLETS BY MOUTH AT BEDTIME TO TREAT RESTLESS LEG    ranitidine (ZANTAC) 150 MG tablet Take 1 tablet (150 mg total) by mouth 2 (two) times daily.    RESTASIS 0.05 % ophthalmic emulsion     ropinirole (REQUIP) 1 MG tablet TK 1 T PO QD AT BEDTIME. START WITH 1/2 T AND. INCREASE TO 1 T AFTER 3 DAYS FOR 30 DAYS    sumatriptan (IMITREX) 100 MG tablet TAKE 1 TABLET BY MOUTH AS NEEDED FOR MIGRAINE; NO MORE THAN 200MG IN 24 HOURS    SYNTHROID 150 mcg tablet     SYNTHROID 200 mcg tablet     temazepam (RESTORIL) 30 mg capsule Take 30 mg by mouth nightly as needed for Insomnia.    topiramate (TOPAMAX) 25 MG tablet TAKE 1 TABLET(25 MG) BY MOUTH TWICE DAILY    triazolam (HALCION) 0.25 MG Tab TAKE 1 TABLET BY MOUTH NIGHTLY AS NEEDED           Clinical Reference Information           Your Vitals Were     Height Weight BMI          5' 3" (1.6 m) 91.6 kg (202 lb) 35.78 kg/m2        Allergies as of 4/17/2017     No Known Allergies      Immunizations Administered on Date of Encounter - 4/17/2017     None      Orders Placed During Today's Visit     Future Labs/Procedures Expected by " Expires    X-Ray Foot Complete Right  4/17/2017 4/17/2018      Instructions    Recommend icing the foot up to 20 minutes daily.    Wear the postoperative boot while ambulatory to allow the fracture site to heal.    Recommend wearing the bone stimulator over the broken portion of bone up to 40 minutes daily.    Compress the foot with an ACE bandage to help with lower extremity swelling.       Language Assistance Services     ATTENTION: Language assistance services are available, free of charge. Please call [phone].      ATENCIÓN: Si habla giacomomarylin, tiene a machado disposición servicios gratuitos de asistencia lingüística. Llame al [phone].     CHÚ Ý: N?u b?n nói Ti?ng Vi?t, có các d?ch v? h? tr? ngôn ng? mi?n phí dành cho b?n. G?i s? [phone].         [facility] complies with applicable Federal civil rights laws and does not discriminate on the basis of race, color, national origin, age, disability, or sex.

## 2017-04-17 NOTE — PROGRESS NOTES
"Subjective:      Patient ID: Kayla Small is a 46 y.o. female.    Chief Complaint: Foot Pain (right foot;painful to bend toes)  Patient presents to clinic with the complaint of continued Rt. Foot pain.  Was previously seen over three months ago for a stress fracture of the Rt. 3rd metatarsal.  Patient was offloaded with a boot, and a bone stimulator was ordered.  Patient was supposed to meet with the [employer] representative to discuss how to apply the bone stimulator, but this never occurred.  Instead of following up in clinic, the patient went for a second opinion with Dr. ANNA (patient unable to recall last name) who is a "bone specialist".  States that she was instructed to continue with the current conservative treatment for fracture management.  States that she remains uncertain on how to use the bone stimulator and failed to inquire from "specialist" how to use the device.  States that she applies the device over the base of the toes, however, admits to infrequently using the stimulator.  She has also been wearing the postoperative boot, however, states that the straps recently broke.  Requests a new boot with today's exam.  With today's exam, states the dorsal foot remains tender and is exacerbated with weight bearing.  Reports that one of her grandchildren recently stepped on the affected foot further exacerbating current symptoms.  Has attempted no other self treatment since our last encounter. Denies any additional pedal complaints.      Past Medical History:   Diagnosis Date    Anemia     Cancer     Thyroid    Multiple sclerosis     Psoriatic arthritis     Thyroid cancer        Past Surgical History:   Procedure Laterality Date    breast augmentation      R and L breast  removed     SECTION, CLASSIC      x 2    COLONOSCOPY      HYSTERECTOMY      TVH with BSO, anemia postoperatively    THYROID SURGERY      tumor removed behind L ear         Family History   Problem Relation Age of " Onset    Heart disease Mother     Diabetes Brother     Breast cancer Neg Hx     Colon cancer Neg Hx        Social History     Social History    Marital status:      Spouse name: N/A    Number of children: N/A    Years of education: N/A     Social History Main Topics    Smoking status: Never Smoker    Smokeless tobacco: Never Used    Alcohol use Yes      Comment: socially    Drug use: No    Sexual activity: Yes     Partners: Male     Birth control/ protection: Surgical     Other Topics Concern    None     Social History Narrative       Current Outpatient Prescriptions   Medication Sig Dispense Refill    ACTHAR H.P. 80 unit/mL injectable gel INJECT 80 UNITS (1ML) SUBCUTANEOUSLY 2 TIMES A WEEK FOR 12 WEEKS 10 mL 5    albuterol (PROAIR HFA) 90 mcg/actuation inhaler Inhale 2 puffs into the lungs every 4 (four) hours as needed for Shortness of Breath. Rescue 1 Inhaler 3    alprazolam (XANAX) 0.5 MG tablet TAKE 1 TABLET BY MOUTH EVERY DAY (Patient taking differently: TAKE 1 TABLET BY MOUTH THREE TIMES DAILY AS NEEDED) 30 tablet 0    armodafinil (NUVIGIL) 250 mg tablet TK 1 T PO  QD 30 tablet 5    baclofen (LIORESAL) 20 MG tablet 1 tab po TID prn muscle spasm 90 tablet 5    bupropion HCl, smoking deter, 150 mg TbSR TAKE 1 TABLET(150 MG) BY MOUTH TWICE DAILY 60 tablet 0    celecoxib (CELEBREX) 200 MG capsule Take 1 capsule (200 mg total) by mouth once daily. 30 capsule 6    cyanocobalamin 1,000 mcg/mL injection Inject 1 mL (1,000 mcg total) into the skin every 7 days. 10 mL 3    donepezil (ARICEPT) 5 MG tablet TAKE 1 TABLET(5 MG) BY MOUTH EVERY EVENING 30 tablet 3    donepezil (ARICEPT) 5 MG tablet TAKE 1 TABLET(5 MG) BY MOUTH EVERY EVENING 90 tablet 4    donepezil (ARICEPT) 5 MG tablet TAKE 1 TABLET(5 MG) BY MOUTH EVERY EVENING 30 tablet 3    DUEXIS 800-26.6 mg Tab Take 1 tablet by mouth Daily.      esomeprazole (NEXIUM) 40 MG capsule TAKE 1 CAPSULE BY MOUTH DAILY AS NEEDED 90 capsule 0     esterified estrogens (MENEST) 1.25 mg Tab Take 1 tablet by mouth once daily. 30 each 6    estradiol (ESTRACE) 2 MG tablet Take 1 tablet (2 mg total) by mouth once daily. 30 tablet 11    fluconazole (DIFLUCAN) 200 MG Tab Take 1 tablet (200 mg total) by mouth once daily. 10 tablet 3    fluticasone (FLONASE) 50 mcg/actuation nasal spray SHAKE LIQUID AND USE 1 SPRAY IN EACH NOSTRIL EVERY DAY 16 g 0    folic acid (FOLVITE) 1 MG tablet TAKE 1 TABLET(1 MG) BY MOUTH EVERY DAY 30 tablet 3    furosemide (LASIX) 20 MG tablet TAKE 1 TABLET(20 MG) BY MOUTH TWICE DAILY 180 tablet 3    gabapentin (NEURONTIN) 300 MG capsule TAKE 1 CAPSULE(300 MG) BY MOUTH THREE TIMES DAILY 90 capsule 3    guaifenesin (MUCINEX) 600 mg 12 hr tablet Take 1 tablet (600 mg total) by mouth 2 (two) times daily. 60 tablet 2    hydrocodone-acetaminophen 10-325mg (NORCO)  mg Tab Take 1 tablet by mouth every 6 (six) hours as needed. 12 tablet 0    levothyroxine (SYNTHROID, LEVOTHROID) 175 MCG tablet Take 175 mcg by mouth once daily.      liothyronine (CYTOMEL) 5 MCG Tab Take 25 mcg by mouth before breakfast.      meloxicam (MOBIC) 15 MG tablet       [START ON 4/19/2017] methotrexate 2.5 MG Tab Take 5 tablets (12.5 mg total) by mouth every Wednesday. 20 tablet 5    methyltestosterone 10 mg Tab Take 1/4 tab daily. This is 2.5 mg daily. 30 each 5    metOLazone (ZAROXOLYN) 2.5 MG tablet       naproxen (NAPROSYN) 500 MG tablet Take 1 tablet (500 mg total) by mouth 2 (two) times daily. 30 tablet 1    ondansetron (ZOFRAN-ODT) 4 MG TbDL Take 1 tablet (4 mg total) by mouth every 6 (six) hours as needed. 12 tablet 3    pantoprazole (PROTONIX) 40 MG tablet       PLEGRIDY 125 mcg/0.5 mL PnIj       potassium chloride (K-TAB) 20 mEq Take 1 tablet (20 mEq total) by mouth 2 (two) times daily. 60 tablet 5    potassium chloride (KLOR-CON) 10 MEQ TbSR       pramipexole (MIRAPEX) 0.5 MG tablet TAKE 1 TO 3 TABLETS BY MOUTH AT BEDTIME TO TREAT RESTLESS  LEG 90 tablet 0    pramipexole (MIRAPEX) 0.5 MG tablet TAKE 1 TO 3 TABLETS BY MOUTH AT BEDTIME TO TREAT RESTLESS LEG 90 tablet 6    ranitidine (ZANTAC) 150 MG tablet Take 1 tablet (150 mg total) by mouth 2 (two) times daily. 60 tablet 11    RESTASIS 0.05 % ophthalmic emulsion       ropinirole (REQUIP) 1 MG tablet TK 1 T PO QD AT BEDTIME. START WITH 1/2 T AND. INCREASE TO 1 T AFTER 3 DAYS FOR 30 DAYS  5    sumatriptan (IMITREX) 100 MG tablet TAKE 1 TABLET BY MOUTH AS NEEDED FOR MIGRAINE; NO MORE THAN 200MG IN 24 HOURS 18 tablet 6    SYNTHROID 150 mcg tablet       SYNTHROID 200 mcg tablet       temazepam (RESTORIL) 30 mg capsule Take 30 mg by mouth nightly as needed for Insomnia.      topiramate (TOPAMAX) 25 MG tablet TAKE 1 TABLET(25 MG) BY MOUTH TWICE DAILY 180 tablet 0    triazolam (HALCION) 0.25 MG Tab TAKE 1 TABLET BY MOUTH NIGHTLY AS NEEDED 30 tablet 0     No current facility-administered medications for this visit.        Review of patient's allergies indicates:   Allergen Reactions    No known allergies        Review of Systems   Constitution: Negative for chills and fever.   Cardiovascular: Negative for claudication.   Skin: Negative for color change, dry skin and nail changes.   Musculoskeletal: Positive for joint swelling. Negative for joint pain.   Neurological: Negative for numbness and paresthesias.   Psychiatric/Behavioral: Negative for altered mental status.           Objective:      Physical Exam   Constitutional: She is oriented to person, place, and time. She appears well-developed and well-nourished. No distress.   Cardiovascular:   Pulses:       Dorsalis pedis pulses are 2+ on the right side, and 2+ on the left side.        Posterior tibial pulses are 2+ on the right side, and 2+ on the left side.   CFT <3 seconds bilateral.  Pedal hair growth present bilateral.   No varicosities noted bilateral.  Mild localized edema noted to the Rt. Foot.   Musculoskeletal: She exhibits edema and  tenderness.   Muscle strength 5/5 in all muscle groups bilateral.  Pain with palpation along the base of the Rt. 3rd metatarsal.  Pain with active and passive flexion and extension of the toes of the Rt. Foot. Bilateral gastrocnemius equinus.  Bilateral pes cavus foot type.  No pain or crepitation with active or passive ROM about all foot and ankle joints.     Neurological: She is alert and oriented to person, place, and time. She has normal strength. No sensory deficit.   Light touch intact bilateral.    Skin: Skin is warm, dry and intact. No abrasion, no bruising, no burn, no ecchymosis, no laceration, no lesion, no petechiae and no rash noted. She is not diaphoretic. No cyanosis or erythema. No pallor. Nails show no clubbing.   Pedal skin has normal turgor, temperature, and texture bilateral.  Toenails x 10 appear normotrophic. Examination of the skin reveals no evidence of significant maceration, rashes, open lesions, suspicious appearing nevi or other concerning lesions.              Assessment:       Encounter Diagnoses   Name Primary?    Foot pain, right Yes    Extensor tendinitis of foot     Metatarsal stress fracture, right, with malunion, subsequent encounter          Plan:       Kayla HAYWARD was seen today for foot pain.    Diagnoses and all orders for this visit:    Foot pain, right    Extensor tendinitis of foot    Metatarsal stress fracture, right, with malunion, subsequent encounter  -     X-Ray Foot Complete Right; Future      I counseled the patient on her conditions, their implications and medical management.    Instructed on how to appropriately use the [employer] bone stimulator.      Advised to resume taking an oral nsaid for foot pain.    Advised to resume RICE of the Rt. Foot.    Dispensed a postoperative boot to offload the fracture site.  Advised to wear at all times.    Advised to ambulate minimally on the Rt. Foot to facilitate fracture healing.    Advised to avoid high impact activities as this  could further disrupt fracture site.    RTC pending Rt. Foot x-ray results.    Darrell Alvarado DPM

## 2017-04-18 PROBLEM — E87.6 HYPOKALEMIA: Status: ACTIVE | Noted: 2017-04-18

## 2017-04-18 PROBLEM — R94.31 ABNORMAL ECG: Status: ACTIVE | Noted: 2017-04-18

## 2017-05-04 PROBLEM — R60.1 GENERALIZED EDEMA: Status: ACTIVE | Noted: 2017-05-04

## 2017-05-10 ENCOUNTER — PATIENT MESSAGE (OUTPATIENT)
Dept: RHEUMATOLOGY | Facility: CLINIC | Age: 47
End: 2017-05-10

## 2017-05-10 DIAGNOSIS — G35 MS (MULTIPLE SCLEROSIS): ICD-10-CM

## 2017-05-10 DIAGNOSIS — L40.50 PSORIATIC ARTHRITIS: ICD-10-CM

## 2017-05-10 DIAGNOSIS — M25.562 ACUTE PAIN OF LEFT KNEE: ICD-10-CM

## 2017-05-10 DIAGNOSIS — G35 MULTIPLE SCLEROSIS: ICD-10-CM

## 2017-05-10 DIAGNOSIS — K21.9 GASTROESOPHAGEAL REFLUX DISEASE, ESOPHAGITIS PRESENCE NOT SPECIFIED: ICD-10-CM

## 2017-05-10 DIAGNOSIS — Z79.631 ENCOUNTER FOR METHOTREXATE MONITORING: ICD-10-CM

## 2017-05-10 DIAGNOSIS — Z51.81 ENCOUNTER FOR METHOTREXATE MONITORING: ICD-10-CM

## 2017-05-10 DIAGNOSIS — L40.50 PSA (PSORIATIC ARTHRITIS): ICD-10-CM

## 2017-05-10 DIAGNOSIS — H53.9 VISION CHANGES: ICD-10-CM

## 2017-05-15 RX ORDER — BACLOFEN 20 MG/1
TABLET ORAL
Qty: 90 TABLET | Refills: 5 | Status: SHIPPED | OUTPATIENT
Start: 2017-05-15 | End: 2018-06-21

## 2017-05-15 RX ORDER — GABAPENTIN 300 MG/1
CAPSULE ORAL
Qty: 90 CAPSULE | Refills: 3 | Status: SHIPPED | OUTPATIENT
Start: 2017-05-15 | End: 2017-08-18 | Stop reason: SDUPTHER

## 2017-06-13 RX ORDER — FUROSEMIDE 20 MG/1
TABLET ORAL
Qty: 60 TABLET | Refills: 1 | Status: SHIPPED | OUTPATIENT
Start: 2017-06-13 | End: 2017-08-19 | Stop reason: SDUPTHER

## 2017-07-19 DIAGNOSIS — J20.9 ACUTE BRONCHITIS, UNSPECIFIED ORGANISM: ICD-10-CM

## 2017-07-19 DIAGNOSIS — L40.50 PSA (PSORIATIC ARTHRITIS): ICD-10-CM

## 2017-07-19 DIAGNOSIS — G35 MS (MULTIPLE SCLEROSIS): ICD-10-CM

## 2017-07-19 RX ORDER — TOPIRAMATE 25 MG/1
TABLET ORAL
Qty: 180 TABLET | Refills: 3 | Status: SHIPPED | OUTPATIENT
Start: 2017-07-19 | End: 2017-09-01 | Stop reason: SDUPTHER

## 2017-07-19 RX ORDER — ALBUTEROL SULFATE 90 UG/1
AEROSOL, METERED RESPIRATORY (INHALATION)
Qty: 8.5 G | Refills: 3 | Status: SHIPPED | OUTPATIENT
Start: 2017-07-19 | End: 2017-07-26

## 2017-07-19 RX ORDER — FOLIC ACID 1 MG/1
TABLET ORAL
Qty: 30 TABLET | Refills: 3 | Status: SHIPPED | OUTPATIENT
Start: 2017-07-19 | End: 2017-09-01 | Stop reason: SDUPTHER

## 2017-07-20 DIAGNOSIS — M25.579 ANKLE PAIN: ICD-10-CM

## 2017-07-20 DIAGNOSIS — L40.50 PSORIATIC ARTHRITIS: ICD-10-CM

## 2017-07-20 DIAGNOSIS — Z79.631 ENCOUNTER FOR METHOTREXATE MONITORING: ICD-10-CM

## 2017-07-20 DIAGNOSIS — Z51.81 ENCOUNTER FOR METHOTREXATE MONITORING: ICD-10-CM

## 2017-07-20 DIAGNOSIS — H53.9 VISION CHANGES: ICD-10-CM

## 2017-07-20 DIAGNOSIS — G35 MULTIPLE SCLEROSIS: ICD-10-CM

## 2017-07-21 RX ORDER — CYANOCOBALAMIN 1000 UG/ML
INJECTION, SOLUTION INTRAMUSCULAR; SUBCUTANEOUS
Qty: 10 ML | Refills: 0 | Status: SHIPPED | OUTPATIENT
Start: 2017-07-21 | End: 2017-09-01 | Stop reason: SDUPTHER

## 2017-07-26 ENCOUNTER — LAB VISIT (OUTPATIENT)
Dept: LAB | Facility: HOSPITAL | Age: 47
End: 2017-07-26
Attending: NURSE PRACTITIONER
Payer: COMMERCIAL

## 2017-07-26 DIAGNOSIS — H46.9 OPTIC NEURITIS DUE TO MULTIPLE SCLEROSIS: ICD-10-CM

## 2017-07-26 DIAGNOSIS — G35 OPTIC NEURITIS DUE TO MULTIPLE SCLEROSIS: ICD-10-CM

## 2017-07-26 DIAGNOSIS — E03.9 HYPOTHYROIDISM, UNSPECIFIED TYPE: ICD-10-CM

## 2017-07-26 DIAGNOSIS — R53.83 FATIGUE, UNSPECIFIED TYPE: ICD-10-CM

## 2017-07-26 LAB
T4 FREE SERPL-MCNC: 2.28 NG/DL
TSH SERPL DL<=0.005 MIU/L-ACNC: 0.46 UIU/ML

## 2017-07-26 PROCEDURE — 84443 ASSAY THYROID STIM HORMONE: CPT

## 2017-07-26 PROCEDURE — 80053 COMPREHEN METABOLIC PANEL: CPT

## 2017-07-26 PROCEDURE — 84439 ASSAY OF FREE THYROXINE: CPT

## 2017-07-26 PROCEDURE — 85025 COMPLETE CBC W/AUTO DIFF WBC: CPT

## 2017-07-26 PROCEDURE — 86255 FLUORESCENT ANTIBODY SCREEN: CPT

## 2017-07-27 LAB
ALBUMIN SERPL BCP-MCNC: 4.5 G/DL
ALP SERPL-CCNC: 106 U/L
ALT SERPL W/O P-5'-P-CCNC: 28 U/L
ANION GAP SERPL CALC-SCNC: 18 MMOL/L
AST SERPL-CCNC: 35 U/L
BASOPHILS # BLD AUTO: 0.02 K/UL
BASOPHILS NFR BLD: 0.2 %
BILIRUB SERPL-MCNC: 0.9 MG/DL
BUN SERPL-MCNC: 16 MG/DL
CALCIUM SERPL-MCNC: 10.5 MG/DL
CHLORIDE SERPL-SCNC: 76 MMOL/L
CO2 SERPL-SCNC: 41 MMOL/L
CREAT SERPL-MCNC: 1.3 MG/DL
DIFFERENTIAL METHOD: ABNORMAL
EOSINOPHIL # BLD AUTO: 0.1 K/UL
EOSINOPHIL NFR BLD: 1 %
ERYTHROCYTE [DISTWIDTH] IN BLOOD BY AUTOMATED COUNT: 15.4 %
EST. GFR  (AFRICAN AMERICAN): 57.3 ML/MIN/1.73 M^2
EST. GFR  (NON AFRICAN AMERICAN): 49.7 ML/MIN/1.73 M^2
GLUCOSE SERPL-MCNC: 98 MG/DL
HCT VFR BLD AUTO: 43.4 %
HGB BLD-MCNC: 14.3 G/DL
LYMPHOCYTES # BLD AUTO: 1.4 K/UL
LYMPHOCYTES NFR BLD: 13.7 %
MCH RBC QN AUTO: 26.9 PG
MCHC RBC AUTO-ENTMCNC: 32.9 G/DL
MCV RBC AUTO: 82 FL
MONOCYTES # BLD AUTO: 1 K/UL
MONOCYTES NFR BLD: 9.9 %
NEUTROPHILS # BLD AUTO: 7.6 K/UL
NEUTROPHILS NFR BLD: 74.9 %
PLATELET # BLD AUTO: 425 K/UL
PMV BLD AUTO: 10.1 FL
POTASSIUM SERPL-SCNC: 2.6 MMOL/L
PROT SERPL-MCNC: 9.1 G/DL
RBC # BLD AUTO: 5.32 M/UL
SODIUM SERPL-SCNC: 135 MMOL/L
WBC # BLD AUTO: 10.09 K/UL

## 2017-07-28 ENCOUNTER — TELEPHONE (OUTPATIENT)
Dept: NEPHROLOGY | Facility: CLINIC | Age: 47
End: 2017-07-28

## 2017-07-28 NOTE — TELEPHONE ENCOUNTER
Left message on identified voicemail.   Calling to schedule appointment in [place] or [place] and call back number.

## 2017-08-02 ENCOUNTER — PATIENT MESSAGE (OUTPATIENT)
Dept: NEPHROLOGY | Facility: CLINIC | Age: 47
End: 2017-08-02

## 2017-08-03 LAB — NMO/AQP4 FACS,S: NEGATIVE

## 2017-08-18 DIAGNOSIS — G35 MS (MULTIPLE SCLEROSIS): ICD-10-CM

## 2017-08-18 DIAGNOSIS — L40.50 PSA (PSORIATIC ARTHRITIS): ICD-10-CM

## 2017-08-18 DIAGNOSIS — R11.2 NAUSEA AND VOMITING, INTRACTABILITY OF VOMITING NOT SPECIFIED, UNSPECIFIED VOMITING TYPE: ICD-10-CM

## 2017-08-22 RX ORDER — FUROSEMIDE 20 MG/1
TABLET ORAL
Qty: 60 TABLET | Refills: 1 | Status: ON HOLD | OUTPATIENT
Start: 2017-08-22 | End: 2017-11-24 | Stop reason: HOSPADM

## 2017-08-22 RX ORDER — FLUCONAZOLE 200 MG/1
TABLET ORAL
Qty: 10 TABLET | Refills: 3 | Status: SHIPPED | OUTPATIENT
Start: 2017-08-22 | End: 2017-09-01 | Stop reason: SDUPTHER

## 2017-08-22 RX ORDER — GABAPENTIN 300 MG/1
CAPSULE ORAL
Qty: 90 CAPSULE | Refills: 2 | Status: SHIPPED | OUTPATIENT
Start: 2017-08-22 | End: 2018-10-12 | Stop reason: SDUPTHER

## 2017-08-22 RX ORDER — ONDANSETRON 4 MG/1
TABLET, ORALLY DISINTEGRATING ORAL
Qty: 12 TABLET | Refills: 3 | Status: ON HOLD | OUTPATIENT
Start: 2017-08-22 | End: 2018-06-20

## 2017-09-01 ENCOUNTER — OFFICE VISIT (OUTPATIENT)
Dept: RHEUMATOLOGY | Facility: CLINIC | Age: 47
End: 2017-09-01
Payer: COMMERCIAL

## 2017-09-01 VITALS
HEART RATE: 83 BPM | WEIGHT: 216.94 LBS | DIASTOLIC BLOOD PRESSURE: 78 MMHG | SYSTOLIC BLOOD PRESSURE: 117 MMHG | BODY MASS INDEX: 38.43 KG/M2

## 2017-09-01 DIAGNOSIS — M25.579 ANKLE PAIN, UNSPECIFIED CHRONICITY, UNSPECIFIED LATERALITY: ICD-10-CM

## 2017-09-01 DIAGNOSIS — H53.9 VISION CHANGES: ICD-10-CM

## 2017-09-01 DIAGNOSIS — L40.50 PSA (PSORIATIC ARTHRITIS): ICD-10-CM

## 2017-09-01 DIAGNOSIS — Z79.631 ENCOUNTER FOR METHOTREXATE MONITORING: ICD-10-CM

## 2017-09-01 DIAGNOSIS — G35 MULTIPLE SCLEROSIS: ICD-10-CM

## 2017-09-01 DIAGNOSIS — L40.50 PSORIATIC ARTHRITIS: ICD-10-CM

## 2017-09-01 DIAGNOSIS — Z51.81 ENCOUNTER FOR METHOTREXATE MONITORING: ICD-10-CM

## 2017-09-01 DIAGNOSIS — E87.6 HYPOKALEMIA: Primary | ICD-10-CM

## 2017-09-01 PROCEDURE — 96372 THER/PROPH/DIAG INJ SC/IM: CPT | Mod: S$GLB,,, | Performed by: INTERNAL MEDICINE

## 2017-09-01 PROCEDURE — 99999 PR PBB SHADOW E&M-EST. PATIENT-LVL IV: CPT | Mod: PBBFAC,,, | Performed by: INTERNAL MEDICINE

## 2017-09-01 PROCEDURE — 3008F BODY MASS INDEX DOCD: CPT | Mod: S$GLB,,, | Performed by: INTERNAL MEDICINE

## 2017-09-01 PROCEDURE — 99215 OFFICE O/P EST HI 40 MIN: CPT | Mod: 25,S$GLB,, | Performed by: INTERNAL MEDICINE

## 2017-09-01 RX ORDER — DONEPEZIL HYDROCHLORIDE 5 MG/1
TABLET, FILM COATED ORAL
Qty: 90 TABLET | Refills: 4 | Status: CANCELLED | OUTPATIENT
Start: 2017-09-01

## 2017-09-01 RX ORDER — DEXAMETHASONE SODIUM PHOSPHATE 4 MG/ML
8 INJECTION, SOLUTION INTRA-ARTICULAR; INTRALESIONAL; INTRAMUSCULAR; INTRAVENOUS; SOFT TISSUE
Status: COMPLETED | OUTPATIENT
Start: 2017-09-01 | End: 2017-09-01

## 2017-09-01 RX ORDER — TOPIRAMATE 25 MG/1
25 TABLET ORAL 2 TIMES DAILY
Qty: 180 TABLET | Refills: 3 | Status: SHIPPED | OUTPATIENT
Start: 2017-09-01 | End: 2018-06-21

## 2017-09-01 RX ORDER — CYANOCOBALAMIN 1000 UG/ML
INJECTION, SOLUTION INTRAMUSCULAR; SUBCUTANEOUS
Qty: 10 ML | Refills: 0 | Status: SHIPPED | OUTPATIENT
Start: 2017-09-01 | End: 2018-08-01 | Stop reason: SDUPTHER

## 2017-09-01 RX ORDER — POTASSIUM CHLORIDE 1500 MG/1
20 TABLET, EXTENDED RELEASE ORAL 2 TIMES DAILY
Qty: 60 TABLET | Refills: 5 | Status: ON HOLD | OUTPATIENT
Start: 2017-09-01 | End: 2017-11-24 | Stop reason: HOSPADM

## 2017-09-01 RX ORDER — FLUCONAZOLE 200 MG/1
200 TABLET ORAL DAILY
Qty: 10 TABLET | Refills: 3 | Status: SHIPPED | OUTPATIENT
Start: 2017-09-01 | End: 2018-08-01 | Stop reason: SDUPTHER

## 2017-09-01 RX ORDER — FOLIC ACID 1 MG/1
1000 TABLET ORAL DAILY
Qty: 30 TABLET | Refills: 3 | Status: SHIPPED | OUTPATIENT
Start: 2017-09-01 | End: 2018-08-01 | Stop reason: SDUPTHER

## 2017-09-01 RX ORDER — METHOTREXATE 2.5 MG/1
12.5 TABLET ORAL
Qty: 20 TABLET | Refills: 5 | Status: SHIPPED | OUTPATIENT
Start: 2017-09-01 | End: 2018-08-01 | Stop reason: SDUPTHER

## 2017-09-01 RX ORDER — ARMODAFINIL 250 MG/1
TABLET ORAL
Qty: 30 TABLET | Refills: 5 | Status: SHIPPED | OUTPATIENT
Start: 2017-09-01 | End: 2017-11-21

## 2017-09-01 RX ORDER — CELECOXIB 200 MG/1
200 CAPSULE ORAL 2 TIMES DAILY
Qty: 60 CAPSULE | Refills: 4 | Status: SHIPPED | OUTPATIENT
Start: 2017-09-01 | End: 2017-10-15 | Stop reason: SDUPTHER

## 2017-09-01 RX ORDER — SUMATRIPTAN SUCCINATE 100 MG/1
TABLET ORAL
Qty: 18 TABLET | Refills: 6 | Status: SHIPPED | OUTPATIENT
Start: 2017-09-01 | End: 2018-06-22 | Stop reason: SDUPTHER

## 2017-09-01 RX ADMIN — DEXAMETHASONE SODIUM PHOSPHATE 8 MG: 4 INJECTION, SOLUTION INTRA-ARTICULAR; INTRALESIONAL; INTRAMUSCULAR; INTRAVENOUS; SOFT TISSUE at 09:09

## 2017-09-01 ASSESSMENT — ROUTINE ASSESSMENT OF PATIENT INDEX DATA (RAPID3)
PSYCHOLOGICAL DISTRESS SCORE: 4.4
TOTAL RAPID3 SCORE: 6
WHEN YOU AWAKENED IN THE MORNING OVER THE LAST WEEK, PLEASE INDICATE THE AMOUNT OF TIME IT TAKES UNTIL YOU ARE AS LIMBER AS YOU WILL BE FOR THE DAY: ALL DAY
MDHAQ FUNCTION SCORE: 1.5
AM STIFFNESS SCORE: 1, YES
PAIN SCORE: 10
FATIGUE SCORE: 6
PATIENT GLOBAL ASSESSMENT SCORE: 3

## 2017-09-01 NOTE — PROGRESS NOTES
Administered 2 cc dexamethasone 4mg/cc  to right upper outer gluteal. Pt tolerated well. No acute reaction noted to site. Pt instructed on S/S to report. Pt verbalized understanding.     Lot: [id number]  Exp: 08/18

## 2017-09-01 NOTE — PROGRESS NOTES
Subjective:       Patient ID: Kayla Small is a 47 y.o. female.    Chief Complaint: Follow-up    Follow up: psoiatic arthritis, MS noticed frwquent falls, memory loss, horrible fatigue on nuvigil. Her present  MS symptoms are not being controlled and is sleeping for days on end. Patient complains of arthralgias and myalgias for which has been present for a few years. Pain is located in multiple joints, both shoulder(s), both elbow(s), both wrist(s), both MCP(s): 1st, 2nd, 3rd, 4th and 5th, both PIP(s): 1st, 2nd, 3rd, 4th and 5th, both DIP(s): 1st and 2nd, both hip(s), both knee(s) and both MTP(s): 1st, 2nd, 3rd, 4th and 5th, is described as aching, pulsating, shooting and throbbing, and is constant, moderate .             Psoriatic Arthritis    The disease course has been fluctuating.     She complains of joint swelling. Associated symptoms include fatigue and myalgias. She complains of lasting between 30 and 60 minutes after awakening.    Treatments tried: mtx, humira, enbrel.     Review of Systems   Constitutional: Positive for activity change and fatigue. Negative for appetite change, chills, diaphoresis and unexpected weight change.   HENT: Negative for congestion, dental problem, ear discharge, ear pain, facial swelling, mouth sores, nosebleeds, postnasal drip, rhinorrhea, sinus pressure, sneezing, sore throat, tinnitus and voice change.    Eyes: Negative for photophobia, pain, discharge, redness and itching.   Respiratory: Negative for apnea, cough, chest tightness, shortness of breath and wheezing.    Cardiovascular: Positive for leg swelling. Negative for chest pain and palpitations.   Gastrointestinal: Negative for abdominal distention, abdominal pain, constipation, diarrhea, nausea and vomiting.   Endocrine: Negative for cold intolerance, heat intolerance, polydipsia and polyuria.   Genitourinary: Negative for decreased urine volume, difficulty urinating, flank pain, frequency, hematuria and urgency.    Musculoskeletal: Positive for arthralgias, back pain, gait problem, joint swelling, myalgias, neck pain and neck stiffness.   Skin: Negative for pallor, rash and wound.   Allergic/Immunologic: Negative for immunocompromised state.   Neurological: Negative for dizziness, tremors, weakness and numbness.   Hematological: Negative for adenopathy. Does not bruise/bleed easily.   Psychiatric/Behavioral: Negative for sleep disturbance. The patient is not nervous/anxious.          Objective:     /78 (BP Location: Left arm, Patient Position: Sitting, BP Method: Large (Automatic))   Pulse 83   Wt 98.4 kg (216 lb 14.9 oz)   BMI 38.43 kg/m²      Physical Exam   Nursing note and vitals reviewed.  Constitutional: She is oriented to person, place, and time. She appears distressed.   HENT:   Head: Normocephalic and atraumatic.   Mouth/Throat: Oropharynx is clear and moist.   Eyes: EOM are normal. Pupils are equal, round, and reactive to light.   Neck: Neck supple. No thyromegaly present.   Cardiovascular: Normal rate, regular rhythm and normal heart sounds.  Exam reveals no gallop and no friction rub.    No murmur heard.  Pulmonary/Chest: Breath sounds normal. She has no wheezes. She has no rales. She exhibits no tenderness.   Abdominal: There is no tenderness. There is no rebound and no guarding.       Right Side Rheumatological Exam     Examination finds the elbow normal.    The patient is tender to palpation of the shoulder, wrist, knee, 1st PIP, 1st MCP, 2nd PIP, 2nd MCP, 3rd PIP, 3rd MCP, 4th PIP, 4th MCP, 5th PIP and 5th MCP    She has swelling of the 1st PIP, 1st MCP, 2nd PIP, 2nd MCP, 3rd PIP, 3rd MCP, 4th PIP, 4th MCP, 5th PIP and 5th MCP    Shoulder Exam   Tenderness Location: no tenderness    Range of Motion   Active Abduction: abnormal   Adduction: abnormal  Sensation: normal    Knee Exam   Patellofemoral Crepitus: positive  Effusion: negative  Sensation: normal    Hip Exam   Tenderness Location:  posterior  Sensation: normal    Elbow/Wrist Exam   Tenderness Location: no tenderness  Sensation: normal    Left Side Rheumatological Exam     Examination finds the elbow normal.    The patient is tender to palpation of the shoulder, wrist, knee, 1st PIP, 1st MCP, 2nd PIP, 2nd MCP, 3rd PIP, 3rd MCP, 4th PIP, 4th MCP, 5th PIP and 5th MCP.    She has swelling of the 1st PIP, 1st MCP, 2nd PIP, 2nd MCP, 3rd PIP, 3rd MCP, 4th PIP, 4th MCP, 5th PIP and 5th MCP    Shoulder Exam   Tenderness Location: no tenderness    Range of Motion   Active Abduction: abnormal   Sensation: normal    Knee Exam     Patellofemoral Crepitus: positive  Effusion: negative  Sensation: normal    Hip Exam   Tenderness Location: posterior  Sensation: normal    Elbow/Wrist Exam   Sensation: normal      Back/Neck Exam   General Inspection   Gait: normal         Lymphadenopathy:     She has no cervical adenopathy.   Neurological: She is alert and oriented to person, place, and time. Gait normal.   Skin: No rash noted. No erythema. No pallor.     Psychiatric: Mood and affect normal.   Musculoskeletal: She exhibits tenderness and deformity. She exhibits no edema.           Results for orders placed or performed in visit on 07/27/17   Magnesium   Result Value Ref Range    Magnesium 2.8 (H) 1.6 - 2.6 mg/dL           Assessment:     Encounter Diagnoses   Name Primary?   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     Comment: start acthar  80 mg im x 2 weeks, then gradual wean 60 (.75mg x 1 week then 40 mg x 0.5 x 1 week then every  2 days x 1 week then  40 mg weekly then stop        Plan:     Hypokalemia  Comments:  likely due to acthar because its a  common side effect, pt will increase K when using  Orders:  -     corticotropin (ACTHAR H.P.) 80 unit/mL injectable gel; INJECT 80 UNITS (1ML) SUBCUTANEOUSLY 2 TIMES A WEEK FOR 12 WEEKS  Dispense: 10 mL; Refill: 5  -     armodafinil (NUVIGIL) 250 mg tablet; TK 1 T PO  QD  Dispense: 30 tablet; Refill: 5  -     CELEBREX 200 mg capsule; Take 1 capsule (200 mg total) by mouth 2 (two) times daily.  Dispense: 60 capsule; Refill: 4  -     cyanocobalamin 1,000 mcg/mL injection; INJECT 1ML INTO THE SKIN EVERY 7 DAYS  Dispense: 10 mL; Refill: 0  -     fluconazole (DIFLUCAN) 200 MG Tab; Take 1 tablet (200 mg total) by mouth once daily.  Dispense: 10 tablet; Refill: 3  -     folic acid (FOLVITE) 1 MG tablet; Take 1 tablet (1,000 mcg total) by mouth once daily.  Dispense: 30 tablet; Refill: 3  -     methotrexate 2.5 MG Tab; Take 5 tablets (12.5 mg total) by mouth every Wednesday.  Dispense: 20 tablet; Refill: 5  -     potassium chloride (K-TAB) 20 mEq; Take 1 tablet (20 mEq total) by mouth 2 (two) times daily.  Dispense: 60 tablet; Refill: 5  -     sumatriptan (IMITREX) 100 MG tablet; TAKE 1 TABLET BY MOUTH AS NEEDED FOR MIGRAINE; NO MORE THAN 200MG IN 24 HOURS  Dispense: 18 tablet; Refill: 6  -     topiramate (TOPAMAX) 25 MG tablet; Take 1 tablet (25 mg total) by mouth 2 (two) times daily.  Dispense: 180 tablet; Refill: 3  -     dexamethasone injection 8 mg; Inject 2 mLs (8 mg total) into the muscle one time.    Multiple sclerosis  -     corticotropin (ACTHAR H.P.) 80 unit/mL injectable gel; INJECT 80 UNITS (1ML) SUBCUTANEOUSLY 2 TIMES A WEEK FOR 12 WEEKS  Dispense: 10 mL; Refill: 5  -     armodafinil (NUVIGIL) 250 mg tablet; TK 1 T PO  QD  Dispense: 30 tablet; Refill: 5  -     CELEBREX 200 mg capsule; Take 1 capsule (200 mg total) by mouth 2 (two) times daily.  Dispense: 60 capsule; Refill: 4  -     cyanocobalamin 1,000 mcg/mL injection; INJECT 1ML INTO THE SKIN EVERY 7 DAYS  Dispense: 10 mL; Refill: 0  -     fluconazole  (DIFLUCAN) 200 MG Tab; Take 1 tablet (200 mg total) by mouth once daily.  Dispense: 10 tablet; Refill: 3  -     folic acid (FOLVITE) 1 MG tablet; Take 1 tablet (1,000 mcg total) by mouth once daily.  Dispense: 30 tablet; Refill: 3  -     methotrexate 2.5 MG Tab; Take 5 tablets (12.5 mg total) by mouth every Wednesday.  Dispense: 20 tablet; Refill: 5  -     potassium chloride (K-TAB) 20 mEq; Take 1 tablet (20 mEq total) by mouth 2 (two) times daily.  Dispense: 60 tablet; Refill: 5  -     sumatriptan (IMITREX) 100 MG tablet; TAKE 1 TABLET BY MOUTH AS NEEDED FOR MIGRAINE; NO MORE THAN 200MG IN 24 HOURS  Dispense: 18 tablet; Refill: 6  -     topiramate (TOPAMAX) 25 MG tablet; Take 1 tablet (25 mg total) by mouth 2 (two) times daily.  Dispense: 180 tablet; Refill: 3  -     dexamethasone injection 8 mg; Inject 2 mLs (8 mg total) into the muscle one time.  -     Comprehensive metabolic panel; Future; Expected date: 09/01/2017  -     CBC auto differential; Future; Expected date: 09/01/2017  -     Sedimentation rate, manual; Future; Expected date: 09/01/2017  -     C-reactive protein; Future; Expected date: 09/01/2017    Psoriatic arthritis  -     corticotropin (ACTHAR H.P.) 80 unit/mL injectable gel; INJECT 80 UNITS (1ML) SUBCUTANEOUSLY 2 TIMES A WEEK FOR 12 WEEKS  Dispense: 10 mL; Refill: 5  -     armodafinil (NUVIGIL) 250 mg tablet; TK 1 T PO  QD  Dispense: 30 tablet; Refill: 5  -     CELEBREX 200 mg capsule; Take 1 capsule (200 mg total) by mouth 2 (two) times daily.  Dispense: 60 capsule; Refill: 4  -     cyanocobalamin 1,000 mcg/mL injection; INJECT 1ML INTO THE SKIN EVERY 7 DAYS  Dispense: 10 mL; Refill: 0  -     fluconazole (DIFLUCAN) 200 MG Tab; Take 1 tablet (200 mg total) by mouth once daily.  Dispense: 10 tablet; Refill: 3  -     folic acid (FOLVITE) 1 MG tablet; Take 1 tablet (1,000 mcg total) by mouth once daily.  Dispense: 30 tablet; Refill: 3  -     methotrexate 2.5 MG Tab; Take 5 tablets (12.5 mg total) by  mouth every Wednesday.  Dispense: 20 tablet; Refill: 5  -     potassium chloride (K-TAB) 20 mEq; Take 1 tablet (20 mEq total) by mouth 2 (two) times daily.  Dispense: 60 tablet; Refill: 5  -     sumatriptan (IMITREX) 100 MG tablet; TAKE 1 TABLET BY MOUTH AS NEEDED FOR MIGRAINE; NO MORE THAN 200MG IN 24 HOURS  Dispense: 18 tablet; Refill: 6  -     topiramate (TOPAMAX) 25 MG tablet; Take 1 tablet (25 mg total) by mouth 2 (two) times daily.  Dispense: 180 tablet; Refill: 3  -     dexamethasone injection 8 mg; Inject 2 mLs (8 mg total) into the muscle one time.  -     Comprehensive metabolic panel; Future; Expected date: 09/01/2017  -     CBC auto differential; Future; Expected date: 09/01/2017  -     Sedimentation rate, manual; Future; Expected date: 09/01/2017  -     C-reactive protein; Future; Expected date: 09/01/2017    Vision changes  -     corticotropin (ACTHAR H.P.) 80 unit/mL injectable gel; INJECT 80 UNITS (1ML) SUBCUTANEOUSLY 2 TIMES A WEEK FOR 12 WEEKS  Dispense: 10 mL; Refill: 5  -     armodafinil (NUVIGIL) 250 mg tablet; TK 1 T PO  QD  Dispense: 30 tablet; Refill: 5  -     CELEBREX 200 mg capsule; Take 1 capsule (200 mg total) by mouth 2 (two) times daily.  Dispense: 60 capsule; Refill: 4  -     cyanocobalamin 1,000 mcg/mL injection; INJECT 1ML INTO THE SKIN EVERY 7 DAYS  Dispense: 10 mL; Refill: 0  -     fluconazole (DIFLUCAN) 200 MG Tab; Take 1 tablet (200 mg total) by mouth once daily.  Dispense: 10 tablet; Refill: 3  -     folic acid (FOLVITE) 1 MG tablet; Take 1 tablet (1,000 mcg total) by mouth once daily.  Dispense: 30 tablet; Refill: 3  -     methotrexate 2.5 MG Tab; Take 5 tablets (12.5 mg total) by mouth every Wednesday.  Dispense: 20 tablet; Refill: 5  -     potassium chloride (K-TAB) 20 mEq; Take 1 tablet (20 mEq total) by mouth 2 (two) times daily.  Dispense: 60 tablet; Refill: 5  -     sumatriptan (IMITREX) 100 MG tablet; TAKE 1 TABLET BY MOUTH AS NEEDED FOR MIGRAINE; NO MORE THAN 200MG IN 24  HOURS  Dispense: 18 tablet; Refill: 6  -     topiramate (TOPAMAX) 25 MG tablet; Take 1 tablet (25 mg total) by mouth 2 (two) times daily.  Dispense: 180 tablet; Refill: 3  -     dexamethasone injection 8 mg; Inject 2 mLs (8 mg total) into the muscle one time.  -     Comprehensive metabolic panel; Future; Expected date: 09/01/2017  -     CBC auto differential; Future; Expected date: 09/01/2017  -     Sedimentation rate, manual; Future; Expected date: 09/01/2017  -     C-reactive protein; Future; Expected date: 09/01/2017    Encounter for methotrexate monitoring  -     corticotropin (ACTHAR H.P.) 80 unit/mL injectable gel; INJECT 80 UNITS (1ML) SUBCUTANEOUSLY 2 TIMES A WEEK FOR 12 WEEKS  Dispense: 10 mL; Refill: 5  -     armodafinil (NUVIGIL) 250 mg tablet; TK 1 T PO  QD  Dispense: 30 tablet; Refill: 5  -     CELEBREX 200 mg capsule; Take 1 capsule (200 mg total) by mouth 2 (two) times daily.  Dispense: 60 capsule; Refill: 4  -     cyanocobalamin 1,000 mcg/mL injection; INJECT 1ML INTO THE SKIN EVERY 7 DAYS  Dispense: 10 mL; Refill: 0  -     fluconazole (DIFLUCAN) 200 MG Tab; Take 1 tablet (200 mg total) by mouth once daily.  Dispense: 10 tablet; Refill: 3  -     folic acid (FOLVITE) 1 MG tablet; Take 1 tablet (1,000 mcg total) by mouth once daily.  Dispense: 30 tablet; Refill: 3  -     methotrexate 2.5 MG Tab; Take 5 tablets (12.5 mg total) by mouth every Wednesday.  Dispense: 20 tablet; Refill: 5  -     potassium chloride (K-TAB) 20 mEq; Take 1 tablet (20 mEq total) by mouth 2 (two) times daily.  Dispense: 60 tablet; Refill: 5  -     sumatriptan (IMITREX) 100 MG tablet; TAKE 1 TABLET BY MOUTH AS NEEDED FOR MIGRAINE; NO MORE THAN 200MG IN 24 HOURS  Dispense: 18 tablet; Refill: 6  -     topiramate (TOPAMAX) 25 MG tablet; Take 1 tablet (25 mg total) by mouth 2 (two) times daily.  Dispense: 180 tablet; Refill: 3  -     dexamethasone injection 8 mg; Inject 2 mLs (8 mg total) into the muscle one time.    Ankle pain,  unspecified chronicity, unspecified laterality  -     corticotropin (ACTHAR H.P.) 80 unit/mL injectable gel; INJECT 80 UNITS (1ML) SUBCUTANEOUSLY 2 TIMES A WEEK FOR 12 WEEKS  Dispense: 10 mL; Refill: 5  -     armodafinil (NUVIGIL) 250 mg tablet; TK 1 T PO  QD  Dispense: 30 tablet; Refill: 5  -     CELEBREX 200 mg capsule; Take 1 capsule (200 mg total) by mouth 2 (two) times daily.  Dispense: 60 capsule; Refill: 4  -     cyanocobalamin 1,000 mcg/mL injection; INJECT 1ML INTO THE SKIN EVERY 7 DAYS  Dispense: 10 mL; Refill: 0  -     fluconazole (DIFLUCAN) 200 MG Tab; Take 1 tablet (200 mg total) by mouth once daily.  Dispense: 10 tablet; Refill: 3  -     folic acid (FOLVITE) 1 MG tablet; Take 1 tablet (1,000 mcg total) by mouth once daily.  Dispense: 30 tablet; Refill: 3  -     methotrexate 2.5 MG Tab; Take 5 tablets (12.5 mg total) by mouth every Wednesday.  Dispense: 20 tablet; Refill: 5  -     potassium chloride (K-TAB) 20 mEq; Take 1 tablet (20 mEq total) by mouth 2 (two) times daily.  Dispense: 60 tablet; Refill: 5  -     sumatriptan (IMITREX) 100 MG tablet; TAKE 1 TABLET BY MOUTH AS NEEDED FOR MIGRAINE; NO MORE THAN 200MG IN 24 HOURS  Dispense: 18 tablet; Refill: 6  -     topiramate (TOPAMAX) 25 MG tablet; Take 1 tablet (25 mg total) by mouth 2 (two) times daily.  Dispense: 180 tablet; Refill: 3  -     dexamethasone injection 8 mg; Inject 2 mLs (8 mg total) into the muscle one time.  -     Comprehensive metabolic panel; Future; Expected date: 09/01/2017  -     CBC auto differential; Future; Expected date: 09/01/2017  -     Sedimentation rate, manual; Future; Expected date: 09/01/2017  -     C-reactive protein; Future; Expected date: 09/01/2017    PSA (psoriatic arthritis)  -     folic acid (FOLVITE) 1 MG tablet; Take 1 tablet (1,000 mcg total) by mouth once daily.  Dispense: 30 tablet; Refill: 3    Other orders  -     Cancel: donepezil (ARICEPT) 5 MG tablet; TAKE 1 TABLET(5 MG) BY MOUTH EVERY EVENING  Dispense:  90 tablet; Refill: 4         Consider Ocrevus will help MS and off label help PSA

## 2017-09-13 DIAGNOSIS — R23.2 HOT FLASHES: ICD-10-CM

## 2017-10-13 ENCOUNTER — TELEPHONE (OUTPATIENT)
Dept: RHEUMATOLOGY | Facility: CLINIC | Age: 47
End: 2017-10-13

## 2017-10-13 NOTE — TELEPHONE ENCOUNTER
----- Message from Susannah An sent at 10/13/2017 10:55 AM CDT -----  Contact: [facility]  States that a prior-authorization request was sent on 10/09/2017 and they were following up on it.  It's for the medication corticotropin (ACTHAR H.P.) 80 unit/mL injectable gel    Can you please look in to this matter and if any questions, please call [facility] back [phone] or fax the request back to:  [phone].  Thank you

## 2017-10-15 DIAGNOSIS — Z51.81 ENCOUNTER FOR METHOTREXATE MONITORING: ICD-10-CM

## 2017-10-15 DIAGNOSIS — H53.9 VISION CHANGES: ICD-10-CM

## 2017-10-15 DIAGNOSIS — E87.6 HYPOKALEMIA: ICD-10-CM

## 2017-10-15 DIAGNOSIS — G35 MULTIPLE SCLEROSIS: ICD-10-CM

## 2017-10-15 DIAGNOSIS — M25.579 ANKLE PAIN, UNSPECIFIED CHRONICITY, UNSPECIFIED LATERALITY: ICD-10-CM

## 2017-10-15 DIAGNOSIS — L40.50 PSORIATIC ARTHRITIS: ICD-10-CM

## 2017-10-15 DIAGNOSIS — Z79.631 ENCOUNTER FOR METHOTREXATE MONITORING: ICD-10-CM

## 2017-10-15 RX ORDER — CELECOXIB 200 MG/1
CAPSULE ORAL
Qty: 30 CAPSULE | Refills: 5 | Status: SHIPPED | OUTPATIENT
Start: 2017-10-15 | End: 2018-08-01 | Stop reason: SDUPTHER

## 2017-10-15 RX ORDER — ARMODAFINIL 250 MG/1
TABLET ORAL
Qty: 30 TABLET | Refills: 5 | Status: SHIPPED | OUTPATIENT
Start: 2017-10-15 | End: 2017-11-21

## 2017-10-17 ENCOUNTER — TELEPHONE (OUTPATIENT)
Dept: RHEUMATOLOGY | Facility: CLINIC | Age: 47
End: 2017-10-17

## 2017-10-17 NOTE — TELEPHONE ENCOUNTER
----- Message from Brittany Gonzalez sent at 10/17/2017  9:21 AM CDT -----  Contact: Irma antony/[facility]  Contact [facility]  pharmacy called regarding status of prior authorization on Acphar, they have not received any information back on October 09/2017 request was faxed to office , contact Irma at [phone] . Thanks.

## 2017-11-13 DIAGNOSIS — H46.9 OPTIC NEURITIS DUE TO MULTIPLE SCLEROSIS: ICD-10-CM

## 2017-11-13 DIAGNOSIS — G35 OPTIC NEURITIS DUE TO MULTIPLE SCLEROSIS: ICD-10-CM

## 2017-11-13 DIAGNOSIS — G35 MULTIPLE SCLEROSIS: ICD-10-CM

## 2017-11-13 RX ORDER — PRAMIPEXOLE DIHYDROCHLORIDE 0.5 MG/1
TABLET ORAL
Qty: 90 TABLET | Refills: 6 | Status: SHIPPED | OUTPATIENT
Start: 2017-11-13 | End: 2017-11-21

## 2017-11-14 DIAGNOSIS — G35 OPTIC NEURITIS DUE TO MULTIPLE SCLEROSIS: ICD-10-CM

## 2017-11-14 DIAGNOSIS — G35 MULTIPLE SCLEROSIS: ICD-10-CM

## 2017-11-14 DIAGNOSIS — H46.9 OPTIC NEURITIS DUE TO MULTIPLE SCLEROSIS: ICD-10-CM

## 2017-11-15 DIAGNOSIS — R23.2 HOT FLASHES: ICD-10-CM

## 2017-11-15 RX ORDER — PRAMIPEXOLE DIHYDROCHLORIDE 0.5 MG/1
TABLET ORAL
Qty: 90 TABLET | Refills: 6 | Status: SHIPPED | OUTPATIENT
Start: 2017-11-15 | End: 2017-11-21

## 2017-11-21 PROBLEM — G89.29 CHRONIC PAIN: Status: ACTIVE | Noted: 2017-11-21

## 2017-11-21 PROBLEM — R41.82 ALTERED MENTAL STATUS: Status: ACTIVE | Noted: 2017-11-21

## 2017-11-21 PROBLEM — R07.9 CHEST PAIN: Status: ACTIVE | Noted: 2017-11-21

## 2017-11-22 PROBLEM — J18.9 PNEUMONIA DUE TO INFECTIOUS ORGANISM: Status: ACTIVE | Noted: 2017-11-22

## 2017-11-24 PROBLEM — R07.9 CHEST PAIN: Status: RESOLVED | Noted: 2017-11-21 | Resolved: 2017-11-24

## 2017-11-27 ENCOUNTER — TELEPHONE (OUTPATIENT)
Dept: PHARMACY | Facility: CLINIC | Age: 47
End: 2017-11-27

## 2017-11-28 NOTE — TELEPHONE ENCOUNTER
[facility] received a prescription for Plegridy and we will contact their insurance company to find out if the medication is covered. We will update patient of status as more information is received. feel free to give us a call with  any questions at [phone].

## 2017-12-04 ENCOUNTER — TELEPHONE (OUTPATIENT)
Dept: RHEUMATOLOGY | Facility: CLINIC | Age: 47
End: 2017-12-04

## 2017-12-04 NOTE — TELEPHONE ENCOUNTER
----- Message from Willa Verduzco sent at 11/30/2017  1:02 PM CST -----  Contact: Nevin Perry  [facility]  Nevin Perry  [facility] would like a home acessment  For the pt cause falling,also physical and occupational therapy. Refer to a low vision eye ...[phone]

## 2017-12-07 ENCOUNTER — TELEPHONE (OUTPATIENT)
Dept: PHARMACY | Facility: CLINIC | Age: 47
End: 2017-12-07

## 2017-12-08 ENCOUNTER — TELEPHONE (OUTPATIENT)
Dept: RHEUMATOLOGY | Facility: CLINIC | Age: 47
End: 2017-12-08

## 2017-12-08 DIAGNOSIS — L40.50 PSA (PSORIATIC ARTHRITIS): ICD-10-CM

## 2017-12-08 DIAGNOSIS — G35 MS (MULTIPLE SCLEROSIS): ICD-10-CM

## 2017-12-08 RX ORDER — FOLIC ACID 1 MG/1
TABLET ORAL
Qty: 30 TABLET | Refills: 3 | Status: SHIPPED | OUTPATIENT
Start: 2017-12-08 | End: 2018-03-25

## 2017-12-08 NOTE — TELEPHONE ENCOUNTER
----- Message from Jessica Davila sent at 12/7/2017  2:24 PM CST -----  Contact: Nevin Perry [facility]  Nevin states that she spoke to you on 11/30 regarding the attached patient and you were to call back and she still has not heard from you.  Three issues were to be addressed; one being if you would refer her to a low vision doctor, order someone to go in and do a home assessment, and if PT/OT would help the patient.  Please call Nevin back at [phone]

## 2017-12-08 NOTE — TELEPHONE ENCOUNTER
Spoke to Nevin with [facility] case management., Advised that [facility] case management has been trying to reach pt to set up hospital follow up. They will see about making sure she is set up with a neurologist.

## 2017-12-08 NOTE — TELEPHONE ENCOUNTER
Left message attempting to reach Nevin with [employer] with case manaement. Advised Dr. Bianchi did not see pt in hospital or do hospital follow ups. It looks like [facility] has been trying to set up for hospital follow up.

## 2017-12-09 DIAGNOSIS — G35 MS (MULTIPLE SCLEROSIS): ICD-10-CM

## 2017-12-09 DIAGNOSIS — L40.50 PSA (PSORIATIC ARTHRITIS): ICD-10-CM

## 2017-12-11 RX ORDER — FOLIC ACID 1 MG/1
TABLET ORAL
Qty: 30 TABLET | Refills: 3 | Status: SHIPPED | OUTPATIENT
Start: 2017-12-11 | End: 2018-03-25

## 2017-12-19 RX ORDER — PRAMIPEXOLE DIHYDROCHLORIDE 0.5 MG/1
TABLET ORAL
Qty: 90 TABLET | Refills: 11 | Status: SHIPPED | OUTPATIENT
Start: 2017-12-19 | End: 2018-08-01 | Stop reason: SDUPTHER

## 2018-01-15 ENCOUNTER — PATIENT MESSAGE (OUTPATIENT)
Dept: RHEUMATOLOGY | Facility: CLINIC | Age: 48
End: 2018-01-15

## 2018-02-22 RX ORDER — DONEPEZIL HYDROCHLORIDE 5 MG/1
TABLET, FILM COATED ORAL
Qty: 90 TABLET | Refills: 4 | Status: SHIPPED | OUTPATIENT
Start: 2018-02-22 | End: 2018-02-24 | Stop reason: SDUPTHER

## 2018-02-22 RX ORDER — FUROSEMIDE 20 MG/1
TABLET ORAL
Qty: 180 TABLET | Refills: 3 | Status: SHIPPED | OUTPATIENT
Start: 2018-02-22 | End: 2018-02-24 | Stop reason: SDUPTHER

## 2018-02-23 RX ORDER — DONEPEZIL HYDROCHLORIDE 5 MG/1
TABLET, FILM COATED ORAL
Qty: 90 TABLET | Refills: 4 | OUTPATIENT
Start: 2018-02-23

## 2018-02-23 RX ORDER — FUROSEMIDE 20 MG/1
TABLET ORAL
Qty: 180 TABLET | Refills: 3 | OUTPATIENT
Start: 2018-02-23

## 2018-02-27 RX ORDER — DONEPEZIL HYDROCHLORIDE 5 MG/1
TABLET, FILM COATED ORAL
Qty: 90 TABLET | Refills: 4 | Status: SHIPPED | OUTPATIENT
Start: 2018-02-27 | End: 2018-10-12 | Stop reason: SDUPTHER

## 2018-02-27 RX ORDER — FUROSEMIDE 20 MG/1
TABLET ORAL
Qty: 180 TABLET | Refills: 3 | Status: SHIPPED | OUTPATIENT
Start: 2018-02-27 | End: 2018-08-01 | Stop reason: SDUPTHER

## 2018-06-17 PROBLEM — R74.8 ELEVATED ALKALINE PHOSPHATASE LEVEL: Status: ACTIVE | Noted: 2018-06-17

## 2018-06-17 PROBLEM — J18.9 PNEUMONIA OF RIGHT UPPER LOBE DUE TO INFECTIOUS ORGANISM: Status: ACTIVE | Noted: 2018-06-17

## 2018-06-17 PROBLEM — D50.9 MICROCYTIC ANEMIA: Status: ACTIVE | Noted: 2018-06-17

## 2018-06-18 PROBLEM — E66.9 OBESITY: Status: ACTIVE | Noted: 2018-06-18

## 2018-06-19 PROBLEM — E66.9 OBESITY: Status: ACTIVE | Noted: 2018-06-19

## 2018-06-22 ENCOUNTER — PATIENT MESSAGE (OUTPATIENT)
Dept: RHEUMATOLOGY | Facility: CLINIC | Age: 48
End: 2018-06-22

## 2018-06-22 DIAGNOSIS — L40.50 PSORIATIC ARTHRITIS: ICD-10-CM

## 2018-06-22 DIAGNOSIS — H53.9 VISION CHANGES: ICD-10-CM

## 2018-06-22 DIAGNOSIS — Z51.81 ENCOUNTER FOR METHOTREXATE MONITORING: ICD-10-CM

## 2018-06-22 DIAGNOSIS — G35 MULTIPLE SCLEROSIS: ICD-10-CM

## 2018-06-22 DIAGNOSIS — M25.579 ANKLE PAIN, UNSPECIFIED CHRONICITY, UNSPECIFIED LATERALITY: ICD-10-CM

## 2018-06-22 DIAGNOSIS — E87.6 HYPOKALEMIA: ICD-10-CM

## 2018-06-22 DIAGNOSIS — Z79.631 ENCOUNTER FOR METHOTREXATE MONITORING: ICD-10-CM

## 2018-06-26 RX ORDER — SUMATRIPTAN SUCCINATE 100 MG/1
TABLET ORAL
Qty: 18 TABLET | Refills: 6 | Status: SHIPPED | OUTPATIENT
Start: 2018-06-26 | End: 2019-03-27 | Stop reason: SDUPTHER

## 2018-06-27 ENCOUNTER — TELEPHONE (OUTPATIENT)
Dept: PHARMACY | Facility: CLINIC | Age: 48
End: 2018-06-27

## 2018-07-02 NOTE — TELEPHONE ENCOUNTER
Gerson Kohli and Staff,    Dieudonne prior authorization has been approved.  Insurance requires the patient to fill specialty medications through [facility]. Directions are below.     The patient has been provided with the necessary information to schedule a delivery.          To complete this in EPIC  1. The original order MUST be discontinued and re-typed as a new prescription with the updated pharmacy listed.     2. I have added [facility] to the patient's preferred pharmacies.      3. Uncheck the box that says [facility] AND Check box with [facility] (pharmacy)    *do not click reorder -- as it will continue to route the rx to [facility] even if the pharmacy is changed.     Please opt the patient out of [facility] when the BPA is fired.     Thank You,  Evy MEDINA

## 2018-07-03 ENCOUNTER — TELEPHONE (OUTPATIENT)
Dept: PHARMACY | Facility: CLINIC | Age: 48
End: 2018-07-03

## 2018-07-03 NOTE — TELEPHONE ENCOUNTER
Reason for call:    Notify patient PA required on Sumatriptan 100mg, which has been submitted to her insurance on 7/3/18 @ 10:21 AM.  PA can take up to 72 hours for a decision to return.    Thanks,   Fernanda Arriaza  Patient Care Advocate   [facility]  Phone: [phone]  Fax: [phone]

## 2018-07-13 ENCOUNTER — TELEPHONE (OUTPATIENT)
Dept: PHARMACY | Facility: CLINIC | Age: 48
End: 2018-07-13

## 2018-07-13 NOTE — TELEPHONE ENCOUNTER
Reason for call:    Notify patient PA for Sumatriptan Succinate was approved by her insurance resulting in a copayment of $0.00.    Will continue to reach patient.    PA Information:  [employer]  [phone]  PA Ref # [id number]  PA Approval Dates: 5/6/18-7/5/19    Fernanda Victoria  Patient Care Advocate   [facility]  Phone: [phone]  Fax: [phone]

## 2018-07-30 ENCOUNTER — PATIENT MESSAGE (OUTPATIENT)
Dept: RHEUMATOLOGY | Facility: CLINIC | Age: 48
End: 2018-07-30

## 2018-07-31 ENCOUNTER — PATIENT MESSAGE (OUTPATIENT)
Dept: RHEUMATOLOGY | Facility: CLINIC | Age: 48
End: 2018-07-31

## 2018-07-31 DIAGNOSIS — G35 MULTIPLE SCLEROSIS: ICD-10-CM

## 2018-07-31 DIAGNOSIS — M25.579 ANKLE PAIN, UNSPECIFIED CHRONICITY, UNSPECIFIED LATERALITY: ICD-10-CM

## 2018-07-31 DIAGNOSIS — Z79.631 ENCOUNTER FOR METHOTREXATE MONITORING: ICD-10-CM

## 2018-07-31 DIAGNOSIS — E87.6 HYPOKALEMIA: ICD-10-CM

## 2018-07-31 DIAGNOSIS — Z51.81 ENCOUNTER FOR METHOTREXATE MONITORING: ICD-10-CM

## 2018-07-31 DIAGNOSIS — L40.50 PSORIATIC ARTHRITIS: ICD-10-CM

## 2018-07-31 DIAGNOSIS — H53.9 VISION CHANGES: ICD-10-CM

## 2018-08-01 ENCOUNTER — CLINICAL SUPPORT (OUTPATIENT)
Dept: RHEUMATOLOGY | Facility: CLINIC | Age: 48
End: 2018-08-01
Payer: COMMERCIAL

## 2018-08-01 ENCOUNTER — PATIENT MESSAGE (OUTPATIENT)
Dept: RHEUMATOLOGY | Facility: CLINIC | Age: 48
End: 2018-08-01

## 2018-08-01 DIAGNOSIS — H53.9 VISION CHANGES: ICD-10-CM

## 2018-08-01 DIAGNOSIS — Z51.81 ENCOUNTER FOR METHOTREXATE MONITORING: ICD-10-CM

## 2018-08-01 DIAGNOSIS — Z79.631 ENCOUNTER FOR METHOTREXATE MONITORING: ICD-10-CM

## 2018-08-01 DIAGNOSIS — L40.50 PSORIATIC ARTHRITIS: ICD-10-CM

## 2018-08-01 DIAGNOSIS — G35 MULTIPLE SCLEROSIS: ICD-10-CM

## 2018-08-01 DIAGNOSIS — L40.50 PSA (PSORIATIC ARTHRITIS): ICD-10-CM

## 2018-08-01 DIAGNOSIS — M25.579 ANKLE PAIN, UNSPECIFIED CHRONICITY, UNSPECIFIED LATERALITY: ICD-10-CM

## 2018-08-01 DIAGNOSIS — R23.2 HOT FLASHES: ICD-10-CM

## 2018-08-01 DIAGNOSIS — E87.6 HYPOKALEMIA: ICD-10-CM

## 2018-08-01 DIAGNOSIS — G35 MULTIPLE SCLEROSIS: Primary | ICD-10-CM

## 2018-08-02 NOTE — TELEPHONE ENCOUNTER
----- Message from Carolee Lowe sent at 8/2/2018  1:58 PM CDT -----  Contact: [facility] [employer]  [facility] pharmacy [phone] called regarding Actjar Gel for above patient. They are requesting one of the following: prior authorization. Thanks!      [id number] [place] - [address] [address]  Phone: [phone] Fax: [phone]

## 2018-08-03 RX ORDER — FOLIC ACID 1 MG/1
1000 TABLET ORAL DAILY
Qty: 30 TABLET | Refills: 3 | Status: SHIPPED | OUTPATIENT
Start: 2018-08-03 | End: 2019-03-01 | Stop reason: SDUPTHER

## 2018-08-03 RX ORDER — PRAMIPEXOLE DIHYDROCHLORIDE 0.5 MG/1
TABLET ORAL
Qty: 90 TABLET | Refills: 11 | Status: ON HOLD | OUTPATIENT
Start: 2018-08-03 | End: 2019-03-07 | Stop reason: HOSPADM

## 2018-08-03 RX ORDER — CELECOXIB 200 MG/1
200 CAPSULE ORAL DAILY
Qty: 30 CAPSULE | Refills: 5 | Status: SHIPPED | OUTPATIENT
Start: 2018-08-03 | End: 2019-05-01

## 2018-08-03 RX ORDER — METHOTREXATE 2.5 MG/1
12.5 TABLET ORAL
Qty: 20 TABLET | Refills: 5 | Status: SHIPPED | OUTPATIENT
Start: 2018-08-08 | End: 2019-03-01

## 2018-08-03 RX ORDER — TOPIRAMATE 25 MG/1
25 TABLET ORAL 2 TIMES DAILY
Qty: 180 TABLET | Refills: 3 | Status: SHIPPED | OUTPATIENT
Start: 2018-08-03 | End: 2019-03-01

## 2018-08-03 RX ORDER — FUROSEMIDE 20 MG/1
20 TABLET ORAL 2 TIMES DAILY
Qty: 180 TABLET | Refills: 3 | Status: ON HOLD | OUTPATIENT
Start: 2018-08-03 | End: 2019-03-07 | Stop reason: HOSPADM

## 2018-08-03 RX ORDER — CYANOCOBALAMIN 1000 UG/ML
INJECTION, SOLUTION INTRAMUSCULAR; SUBCUTANEOUS
Qty: 10 ML | Refills: 0 | Status: SHIPPED | OUTPATIENT
Start: 2018-08-03 | End: 2018-12-11

## 2018-08-03 RX ORDER — FLUCONAZOLE 200 MG/1
200 TABLET ORAL DAILY
Qty: 10 TABLET | Refills: 3 | Status: SHIPPED | OUTPATIENT
Start: 2018-08-03 | End: 2019-03-01 | Stop reason: SDUPTHER

## 2018-08-06 ENCOUNTER — TELEPHONE (OUTPATIENT)
Dept: RHEUMATOLOGY | Facility: CLINIC | Age: 48
End: 2018-08-06

## 2018-08-06 NOTE — TELEPHONE ENCOUNTER
----- Message from David James sent at 8/6/2018  3:40 PM CDT -----  Contact: [facility]  Calling to see if a prior authorization is going to be put in on Rx corticotropin (ACTHAR H.P.) 80 unit/mL injectable gel 10 mL   Call [facility] [phone] . Thanks!

## 2018-08-08 ENCOUNTER — TELEPHONE (OUTPATIENT)
Dept: RHEUMATOLOGY | Facility: CLINIC | Age: 48
End: 2018-08-08

## 2018-08-08 NOTE — TELEPHONE ENCOUNTER
----- Message from Di Stratton sent at 8/8/2018  8:47 AM CDT -----  Contact: Ashlee with [facility]   Ashlee with [facility] need to speak with a nurse regarding a prior authorization for rx corticotropin (ACTHAR H.P.) 80 unit/mL injectable gel. Please call back at [phone]

## 2018-08-29 ENCOUNTER — OFFICE VISIT (OUTPATIENT)
Dept: RHEUMATOLOGY | Facility: CLINIC | Age: 48
End: 2018-08-29
Payer: COMMERCIAL

## 2018-08-29 VITALS
WEIGHT: 226.19 LBS | DIASTOLIC BLOOD PRESSURE: 75 MMHG | HEIGHT: 63 IN | BODY MASS INDEX: 40.08 KG/M2 | HEART RATE: 93 BPM | SYSTOLIC BLOOD PRESSURE: 124 MMHG

## 2018-08-29 DIAGNOSIS — L40.50 PSORIATIC ARTHRITIS: ICD-10-CM

## 2018-08-29 DIAGNOSIS — G35 MULTIPLE SCLEROSIS: ICD-10-CM

## 2018-08-29 DIAGNOSIS — I63.9 PERSISTENT MIGRAINE AURA WITH CEREBRAL INFARCTION AND STATUS MIGRAINOSUS, NOT INTRACTABLE: Primary | ICD-10-CM

## 2018-08-29 DIAGNOSIS — G43.601 PERSISTENT MIGRAINE AURA WITH CEREBRAL INFARCTION AND STATUS MIGRAINOSUS, NOT INTRACTABLE: Primary | ICD-10-CM

## 2018-08-29 DIAGNOSIS — G89.29 OTHER CHRONIC PAIN: ICD-10-CM

## 2018-08-29 DIAGNOSIS — M06.00 SERONEGATIVE RHEUMATOID ARTHRITIS: ICD-10-CM

## 2018-08-29 PROCEDURE — 3008F BODY MASS INDEX DOCD: CPT | Mod: CPTII,S$GLB,, | Performed by: INTERNAL MEDICINE

## 2018-08-29 PROCEDURE — 99999 PR PBB SHADOW E&M-EST. PATIENT-LVL V: CPT | Mod: PBBFAC,,, | Performed by: INTERNAL MEDICINE

## 2018-08-29 PROCEDURE — 96372 THER/PROPH/DIAG INJ SC/IM: CPT | Mod: S$GLB,,, | Performed by: INTERNAL MEDICINE

## 2018-08-29 PROCEDURE — 99215 OFFICE O/P EST HI 40 MIN: CPT | Mod: 25,S$GLB,, | Performed by: INTERNAL MEDICINE

## 2018-08-29 RX ORDER — POTASSIUM CHLORIDE 750 MG/1
10 TABLET, EXTENDED RELEASE ORAL DAILY
Status: ON HOLD | COMMUNITY
Start: 2018-08-01 | End: 2019-03-07 | Stop reason: HOSPADM

## 2018-08-29 RX ORDER — CLORAZEPATE DIPOTASSIUM 15 MG/1
TABLET ORAL
COMMUNITY
End: 2019-03-01

## 2018-08-29 RX ORDER — METHYLPREDNISOLONE ACETATE 80 MG/ML
80 INJECTION, SUSPENSION INTRA-ARTICULAR; INTRALESIONAL; INTRAMUSCULAR; SOFT TISSUE
Status: COMPLETED | OUTPATIENT
Start: 2018-08-29 | End: 2018-08-29

## 2018-08-29 RX ORDER — ALBUTEROL SULFATE 90 UG/1
AEROSOL, METERED RESPIRATORY (INHALATION)
Status: ON HOLD | COMMUNITY
End: 2019-03-07 | Stop reason: HOSPADM

## 2018-08-29 RX ORDER — DEXAMETHASONE 1 MG/1
1 TABLET ORAL DAILY
Qty: 30 TABLET | Refills: 4 | Status: SHIPPED | OUTPATIENT
Start: 2018-08-29 | End: 2019-01-14

## 2018-08-29 RX ORDER — METOLAZONE 2.5 MG/1
2.5 TABLET ORAL
Status: ON HOLD | COMMUNITY
End: 2019-03-07 | Stop reason: HOSPADM

## 2018-08-29 RX ORDER — DEXAMETHASONE SODIUM PHOSPHATE 4 MG/ML
4 INJECTION, SOLUTION INTRA-ARTICULAR; INTRALESIONAL; INTRAMUSCULAR; INTRAVENOUS; SOFT TISSUE
Status: COMPLETED | OUTPATIENT
Start: 2018-08-29 | End: 2018-08-29

## 2018-08-29 RX ORDER — BETAMETHASONE SODIUM PHOSPHATE AND BETAMETHASONE ACETATE 3; 3 MG/ML; MG/ML
6 INJECTION, SUSPENSION INTRA-ARTICULAR; INTRALESIONAL; INTRAMUSCULAR; SOFT TISSUE
Status: COMPLETED | OUTPATIENT
Start: 2018-08-29 | End: 2018-08-29

## 2018-08-29 RX ORDER — PROMETHAZINE HYDROCHLORIDE 50 MG/1
50 TABLET ORAL
Status: ON HOLD | COMMUNITY
End: 2019-03-07 | Stop reason: HOSPADM

## 2018-08-29 RX ORDER — MELOXICAM 15 MG/1
TABLET ORAL
COMMUNITY
End: 2019-05-02

## 2018-08-29 RX ORDER — DICLOFENAC EPOLAMINE 0.01 G/1
SYSTEM TOPICAL
COMMUNITY
End: 2018-11-13

## 2018-08-29 RX ORDER — TRIAZOLAM 0.25 MG/1
0.25 TABLET ORAL NIGHTLY
COMMUNITY
End: 2019-03-01

## 2018-08-29 RX ORDER — AZATHIOPRINE 50 MG/1
50 TABLET ORAL DAILY
Qty: 30 TABLET | Refills: 3 | Status: SHIPPED | OUTPATIENT
Start: 2018-08-29 | End: 2019-03-01 | Stop reason: SDUPTHER

## 2018-08-29 RX ORDER — TOPIRAMATE 50 MG/1
50 CAPSULE, EXTENDED RELEASE ORAL DAILY
Qty: 30 CAPSULE | Refills: 6 | Status: SHIPPED | OUTPATIENT
Start: 2018-08-29 | End: 2019-03-01 | Stop reason: SDUPTHER

## 2018-08-29 RX ORDER — IBUPROFEN AND FAMOTIDINE 26.6; 8 MG/1; MG/1
TABLET ORAL
Status: ON HOLD | COMMUNITY
End: 2019-03-07 | Stop reason: HOSPADM

## 2018-08-29 RX ORDER — BENZONATATE 200 MG/1
CAPSULE ORAL
Status: ON HOLD | COMMUNITY
Start: 2018-06-15 | End: 2019-03-07 | Stop reason: HOSPADM

## 2018-08-29 RX ORDER — DICLOFENAC SODIUM 10 MG/G
GEL TOPICAL
COMMUNITY
End: 2018-11-13

## 2018-08-29 RX ORDER — PHENAZOPYRIDINE HYDROCHLORIDE 200 MG/1
200 TABLET, FILM COATED ORAL
Status: ON HOLD | COMMUNITY
End: 2019-03-07 | Stop reason: HOSPADM

## 2018-08-29 RX ADMIN — METHYLPREDNISOLONE ACETATE 80 MG: 80 INJECTION, SUSPENSION INTRA-ARTICULAR; INTRALESIONAL; INTRAMUSCULAR; SOFT TISSUE at 11:08

## 2018-08-29 RX ADMIN — BETAMETHASONE SODIUM PHOSPHATE AND BETAMETHASONE ACETATE 6 MG: 3; 3 INJECTION, SUSPENSION INTRA-ARTICULAR; INTRALESIONAL; INTRAMUSCULAR; SOFT TISSUE at 11:08

## 2018-08-29 RX ADMIN — DEXAMETHASONE SODIUM PHOSPHATE 4 MG: 4 INJECTION, SOLUTION INTRA-ARTICULAR; INTRALESIONAL; INTRAMUSCULAR; INTRAVENOUS; SOFT TISSUE at 11:08

## 2018-08-29 NOTE — PROGRESS NOTES
Administered 1 cc ( 6 mg/ml ) of celestone to the right upper outer gluteal. Informed of s/s to report verbalized understanding. No adverse reactions noted.    Lot # [id number]  Expiration jul 2019    Administered 1 cc dexamethasone 4mg/cc  to right upper outer gluteal. Pt tolerated well. No acute reaction noted to site. Pt instructed on S/S to report. Pt verbalized understanding.     Lot: [id number]  Exp: 03/2020    Administered 1 cc ( 80 mg/ml ) of depomedrol to the right upper outer gluteal. Informed of s/s to report verbalized understanding. No adverse reactions noted.    Lot # [id number]  Expiration 08/19

## 2018-08-29 NOTE — PROGRESS NOTES
Subjective:       Patient ID: Kayla Small is a 48 y.o. female.    Chief Complaint: Follow-up    Follow up: psoiatic arthritis, MS , consider Rituxan, she is having arthritis flares and in pain. Her present  MS symptoms are not being controlled and is sleeping for days on end. Patient complains of arthralgias and myalgias for which has been present for a few years. Pain is located in multiple joints, both shoulder(s), both elbow(s), both wrist(s), both MCP(s): 1st, 2nd, 3rd, 4th and 5th, both PIP(s): 1st, 2nd, 3rd, 4th and 5th, both DIP(s): 1st and 2nd, both hip(s), both knee(s) and both MTP(s): 1st, 2nd, 3rd, 4th and 5th, is described as aching, pulsating, shooting and throbbing, and is constant, moderate .       Review of Systems   Constitutional: Positive for activity change. Negative for appetite change, chills, diaphoresis and unexpected weight change.   HENT: Negative for congestion, dental problem, ear discharge, ear pain, facial swelling, mouth sores, nosebleeds, postnasal drip, rhinorrhea, sinus pressure, sneezing, sore throat, tinnitus and voice change.    Eyes: Negative for photophobia, pain, discharge, redness and itching.   Respiratory: Negative for apnea, cough, chest tightness, shortness of breath and wheezing.    Cardiovascular: Positive for leg swelling. Negative for chest pain and palpitations.   Gastrointestinal: Negative for abdominal distention, abdominal pain, constipation, diarrhea, nausea and vomiting.   Endocrine: Negative for cold intolerance, heat intolerance, polydipsia and polyuria.   Genitourinary: Negative for decreased urine volume, difficulty urinating, flank pain, frequency, hematuria and urgency.   Musculoskeletal: Positive for arthralgias, back pain, gait problem, neck pain and neck stiffness.   Skin: Negative for pallor, rash and wound.   Allergic/Immunologic: Negative for immunocompromised state.   Neurological: Negative for dizziness, tremors, weakness and numbness.    Hematological: Negative for adenopathy. Does not bruise/bleed easily.   Psychiatric/Behavioral: Negative for sleep disturbance. The patient is not nervous/anxious.          Objective:     There were no vitals taken for this visit.     Physical Exam   Nursing note and vitals reviewed.  Constitutional: She is oriented to person, place, and time. She appears distressed.   HENT:   Head: Normocephalic and atraumatic.   Mouth/Throat: Oropharynx is clear and moist.   Eyes: EOM are normal. Pupils are equal, round, and reactive to light.   Neck: Neck supple. No thyromegaly present.   Cardiovascular: Normal rate, regular rhythm and normal heart sounds.  Exam reveals no gallop and no friction rub.    No murmur heard.  Pulmonary/Chest: Breath sounds normal. She has no wheezes. She has no rales. She exhibits no tenderness.   Abdominal: There is no tenderness. There is no rebound and no guarding.       Right Side Rheumatological Exam     Examination finds the elbow normal.    The patient is tender to palpation of the shoulder, wrist, knee, 1st PIP, 1st MCP, 2nd PIP, 2nd MCP, 3rd PIP, 3rd MCP, 4th PIP, 4th MCP, 5th PIP and 5th MCP    She has swelling of the 1st PIP, 1st MCP, 2nd PIP, 2nd MCP, 3rd PIP, 3rd MCP, 4th PIP, 4th MCP, 5th PIP and 5th MCP    Shoulder Exam   Tenderness Location: no tenderness    Range of Motion   Active abduction: abnormal   Adduction: abnormal  Sensation: normal    Knee Exam   Patellofemoral Crepitus: positive  Effusion: negative  Sensation: normal    Hip Exam   Tenderness Location: posterior  Sensation: normal    Elbow/Wrist Exam   Tenderness Location: no tenderness  Sensation: normal    Left Side Rheumatological Exam     Examination finds the elbow normal.    The patient is tender to palpation of the shoulder, wrist, knee, 1st PIP, 1st MCP, 2nd PIP, 2nd MCP, 3rd PIP, 3rd MCP, 4th PIP, 4th MCP, 5th PIP and 5th MCP.    She has swelling of the 1st PIP, 1st MCP, 2nd PIP, 2nd MCP, 3rd PIP, 3rd MCP, 4th  PIP, 4th MCP, 5th PIP and 5th MCP    Shoulder Exam   Tenderness Location: no tenderness    Range of Motion   Active abduction: abnormal   Sensation: normal    Knee Exam     Patellofemoral Crepitus: positive  Effusion: negative  Sensation: normal    Hip Exam   Tenderness Location: posterior  Sensation: normal    Elbow/Wrist Exam   Sensation: normal      Back/Neck Exam   General Inspection   Gait: normal         Lymphadenopathy:     She has no cervical adenopathy.   Neurological: She is alert and oriented to person, place, and time. Gait normal.   Skin: No rash noted. No erythema. No pallor.     Psychiatric: Mood and affect normal.   Musculoskeletal: She exhibits tenderness and deformity. She exhibits no edema.           Results for orders placed or performed in visit on 07/31/18   Comprehensive metabolic panel   Result Value Ref Range    Sodium 138 136 - 145 mmol/L    Potassium 3.5 3.5 - 5.1 mmol/L    Chloride 99 95 - 110 mmol/L    CO2 30 22 - 31 mmol/L    Glucose 86 70 - 110 mg/dL    BUN, Bld 13 7 - 18 mg/dL    Creatinine 0.71 0.50 - 1.40 mg/dL    Calcium 9.7 8.4 - 10.2 mg/dL    Total Protein 7.1 6.0 - 8.4 g/dL    Albumin 4.1 3.5 - 5.2 g/dL    Total Bilirubin 0.4 0.2 - 1.3 mg/dL    Alkaline Phosphatase 89 38 - 145 U/L    AST 28 14 - 36 U/L    ALT 36 10 - 44 U/L    Anion Gap 9 8 - 16 mmol/L    eGFR if African American >60 >60 mL/min/1.73 m^2    eGFR if non African American >60 >60 mL/min/1.73 m^2   CBC auto differential   Result Value Ref Range    WBC 5.34 3.90 - 12.70 K/uL    RBC 5.63 (H) 4.00 - 5.40 M/uL    Hemoglobin 13.2 12.0 - 16.0 g/dL    Hematocrit 41.4 37.0 - 48.5 %    MCV 74 (L) 82 - 98 fL    MCH 23.4 (L) 27.0 - 31.0 pg    MCHC 31.9 (L) 32.0 - 36.0 g/dL    RDW 25.3 (H) 11.5 - 14.5 %    Platelets 453 (H) 150 - 350 K/uL    MPV 10.2 9.2 - 12.9 fL    Gran # (ANC) 3.1 1.8 - 7.7 K/uL    Lymph # 1.6 1.0 - 4.8 K/uL    Mono # 0.4 0.3 - 1.0 K/uL    Eos # 0.2 0.0 - 0.5 K/uL    Baso # 0.04 0.00 - 0.20 K/uL    nRBC 0 0  /100 WBC    Gran% 58.4 38.0 - 73.0 %    Lymph% 29.6 18.0 - 48.0 %    Mono% 7.9 4.0 - 15.0 %    Eosinophil% 3.4 0.0 - 8.0 %    Basophil% 0.7 0.0 - 1.9 %    Aniso Moderate     Poik Slight     Hypo Occasional     Ovalocytes Occasional     Large/Giant Platelets Present     Differential Method Automated    Sedimentation rate, manual   Result Value Ref Range    Sed Rate 45 (H) 0 - 19 mm/Hr   C-reactive protein   Result Value Ref Range    CRP 1.60 (H) 0.00 - 0.90 mg/dL   CBC auto differential   Result Value Ref Range    WBC 5.34 3.90 - 12.70 K/uL    RBC 5.63 (H) 4.00 - 5.40 M/uL    Hemoglobin 13.2 12.0 - 16.0 g/dL    Hematocrit 41.4 37.0 - 48.5 %    MCV 74 (L) 82 - 98 fL    MCH 23.4 (L) 27.0 - 31.0 pg    MCHC 31.9 (L) 32.0 - 36.0 g/dL    RDW 25.3 (H) 11.5 - 14.5 %    Platelets 453 (H) 150 - 350 K/uL    MPV 10.2 9.2 - 12.9 fL    Gran # (ANC) 3.1 1.8 - 7.7 K/uL    Lymph # 1.6 1.0 - 4.8 K/uL    Mono # 0.4 0.3 - 1.0 K/uL    Eos # 0.2 0.0 - 0.5 K/uL    Baso # 0.04 0.00 - 0.20 K/uL    nRBC 0 0 /100 WBC    Gran% 58.4 38.0 - 73.0 %    Lymph% 29.6 18.0 - 48.0 %    Mono% 7.9 4.0 - 15.0 %    Eosinophil% 3.4 0.0 - 8.0 %    Basophil% 0.7 0.0 - 1.9 %    Aniso Moderate     Poik Slight     Hypo Occasional     Ovalocytes Occasional     Large/Giant Platelets Present     Differential Method Automated    CBC auto differential   Result Value Ref Range    WBC 5.29 3.90 - 12.70 K/uL    RBC 5.63 (H) 4.00 - 5.40 M/uL    Hemoglobin 13.3 12.0 - 16.0 g/dL    Hematocrit 41.4 37.0 - 48.5 %    MCV 74 (L) 82 - 98 fL    MCH 23.6 (L) 27.0 - 31.0 pg    MCHC 32.1 32.0 - 36.0 g/dL    RDW 25.5 (H) 11.5 - 14.5 %    Platelets 447 (H) 150 - 350 K/uL    MPV 10.3 9.2 - 12.9 fL    Gran # (ANC) 3.0 1.8 - 7.7 K/uL    Lymph # 1.7 1.0 - 4.8 K/uL    Mono # 0.4 0.3 - 1.0 K/uL    Eos # 0.2 0.0 - 0.5 K/uL    Baso # 0.03 0.00 - 0.20 K/uL    nRBC 0 0 /100 WBC    Gran% 56.6 38.0 - 73.0 %    Lymph% 31.2 18.0 - 48.0 %    Mono% 7.8 4.0 - 15.0 %    Eosinophil% 3.8 0.0 - 8.0 %     Basophil% 0.6 0.0 - 1.9 %    Differential Method Automated    ACTH   Result Value Ref Range    ACTH 17 0 - 46 pg/mL   Comprehensive metabolic panel   Result Value Ref Range    Sodium 138 136 - 145 mmol/L    Potassium 3.6 3.5 - 5.1 mmol/L    Chloride 98 95 - 110 mmol/L    CO2 30 22 - 31 mmol/L    Glucose 87 70 - 110 mg/dL    BUN, Bld 12 7 - 18 mg/dL    Creatinine 0.80 0.50 - 1.40 mg/dL    Calcium 9.7 8.4 - 10.2 mg/dL    Total Protein 6.9 6.0 - 8.4 g/dL    Albumin 4.1 3.5 - 5.2 g/dL    Total Bilirubin 0.5 0.2 - 1.3 mg/dL    Alkaline Phosphatase 91 38 - 145 U/L    AST 28 14 - 36 U/L    ALT 27 10 - 44 U/L    Anion Gap 10 8 - 16 mmol/L    eGFR if African American >60 >60 mL/min/1.73 m^2    eGFR if non African American >60 >60 mL/min/1.73 m^2   Growth hormone   Result Value Ref Range    Growth Hormone <0.05 (L) 0.05 - 8.00 ng/mL   Insulin-like growth factor   Result Value Ref Range    Insulin-Like GF-1 91 64 - 246 ng/mL    Z Score -1.2    Prolactin   Result Value Ref Range    Prolactin 19.7 5.2 - 26.5 ng/mL   TSH   Result Value Ref Range    TSH 1.240 0.400 - 4.000 uIU/mL   Thyroglobulin   Result Value Ref Range    Thyroglobulin Ab Screen <0.9 0.0 - 4.0 IU/mL    Thyroglobulin <0.1 (L) 1.3 - 31.8 ng/mL    Thyroglobulin, LC/MS/MS Not Applicable 1.3 - 31.8 ng/mL   Anti-thyroglobulin antibody   Result Value Ref Range    Thyroglobulin Ab Screen <0.9 0.0 - 4.0 IU/mL   T4, free   Result Value Ref Range    Free T4 1.64 0.78 - 2.19 ng/dL   T3, free   Result Value Ref Range    T3, Free 4.69 2.77 - 5.27 pg/mL   Vitamin D   Result Value Ref Range    Vit D, 25-Hydroxy 31 30 - 96 ng/mL   Basic metabolic panel   Result Value Ref Range    Sodium 138 136 - 145 mmol/L    Potassium 3.6 3.5 - 5.1 mmol/L    Chloride 98 95 - 110 mmol/L    CO2 30 22 - 31 mmol/L    Glucose 87 70 - 110 mg/dL    BUN, Bld 12 7 - 18 mg/dL    Creatinine 0.80 0.50 - 1.40 mg/dL    Calcium 9.7 8.4 - 10.2 mg/dL    Anion Gap 10 8 - 16 mmol/L    eGFR if African American >60  >60 mL/min/1.73 m^2    eGFR if non African American >60 >60 mL/min/1.73 m^2   Aldosterone/Renin Activity Ratio   Result Value Ref Range    Aldosterone 23.3 ng/dL    Renin 12.3 ng/mL/hr    Aldosterone/Renin Activity Calculation 1.9 <=25.0 ratio   CBC auto differential   Result Value Ref Range    WBC 5.36 3.90 - 12.70 K/uL    RBC 5.67 (H) 4.00 - 5.40 M/uL    Hemoglobin 13.4 12.0 - 16.0 g/dL    Hematocrit 41.6 37.0 - 48.5 %    MCV 73 (L) 82 - 98 fL    MCH 23.6 (L) 27.0 - 31.0 pg    MCHC 32.2 32.0 - 36.0 g/dL    RDW 25.5 (H) 11.5 - 14.5 %    Platelets 454 (H) 150 - 350 K/uL    MPV 10.1 9.2 - 12.9 fL    Gran # (ANC) 3.0 1.8 - 7.7 K/uL    Lymph # 1.7 1.0 - 4.8 K/uL    Mono # 0.4 0.3 - 1.0 K/uL    Eos # 0.2 0.0 - 0.5 K/uL    Baso # 0.03 0.00 - 0.20 K/uL    nRBC 0 0 /100 WBC    Gran% 56.4 38.0 - 73.0 %    Lymph% 31.9 18.0 - 48.0 %    Mono% 7.6 4.0 - 15.0 %    Eosinophil% 3.5 0.0 - 8.0 %    Basophil% 0.6 0.0 - 1.9 %    Differential Method Automated            Assessment:     Encounter Diagnoses   Name Primary?    Persistent migraine aura with cerebral infarction and status migrainosus, not intractable Yes    Multiple sclerosis     Other chronic pain     Psoriatic arthritis         Plan:     Persistent migraine aura with cerebral infarction and status migrainosus, not intractable  -     topiramate (TROKENDI XR) 50 mg Cp24; Take 50 mg by mouth once daily.  Dispense: 30 capsule; Refill: 6  -     dexamethasone injection 4 mg; Inject 1 mL (4 mg total) into the muscle one time.  -     betamethasone acetate-betamethasone sodium phosphate injection 6 mg; Inject 1 mL (6 mg total) into the muscle one time.  -     methylPREDNISolone acetate injection 80 mg; Inject 1 mL (80 mg total) into the muscle one time.  -     dexamethasone (DECADRON) 1 MG Tab; Take 1 tablet (1 mg total) by mouth once daily.  Dispense: 30 tablet; Refill: 4  -     Comprehensive metabolic panel; Future; Expected date: 08/29/2018  -     C-reactive protein;  Future; Expected date: 08/29/2018  -     Sedimentation rate; Future; Expected date: 08/29/2018  -     CBC auto differential; Future; Expected date: 08/29/2018  -     Thiopurine Methyltrans (TPMT) Genotyping; Future; Expected date: 08/29/2018    Multiple sclerosis  -     dexamethasone injection 4 mg; Inject 1 mL (4 mg total) into the muscle one time.  -     betamethasone acetate-betamethasone sodium phosphate injection 6 mg; Inject 1 mL (6 mg total) into the muscle one time.  -     methylPREDNISolone acetate injection 80 mg; Inject 1 mL (80 mg total) into the muscle one time.  -     dexamethasone (DECADRON) 1 MG Tab; Take 1 tablet (1 mg total) by mouth once daily.  Dispense: 30 tablet; Refill: 4  -     Comprehensive metabolic panel; Future; Expected date: 08/29/2018  -     C-reactive protein; Future; Expected date: 08/29/2018  -     Sedimentation rate; Future; Expected date: 08/29/2018  -     CBC auto differential; Future; Expected date: 08/29/2018  -     Thiopurine Methyltrans (TPMT) Genotyping; Future; Expected date: 08/29/2018    Other chronic pain  -     dexamethasone injection 4 mg; Inject 1 mL (4 mg total) into the muscle one time.  -     betamethasone acetate-betamethasone sodium phosphate injection 6 mg; Inject 1 mL (6 mg total) into the muscle one time.  -     methylPREDNISolone acetate injection 80 mg; Inject 1 mL (80 mg total) into the muscle one time.  -     dexamethasone (DECADRON) 1 MG Tab; Take 1 tablet (1 mg total) by mouth once daily.  Dispense: 30 tablet; Refill: 4  -     Comprehensive metabolic panel; Future; Expected date: 08/29/2018  -     C-reactive protein; Future; Expected date: 08/29/2018  -     Sedimentation rate; Future; Expected date: 08/29/2018  -     CBC auto differential; Future; Expected date: 08/29/2018  -     Thiopurine Methyltrans (TPMT) Genotyping; Future; Expected date: 08/29/2018    Psoriatic arthritis  -     dexamethasone injection 4 mg; Inject 1 mL (4 mg total) into the muscle  one time.  -     betamethasone acetate-betamethasone sodium phosphate injection 6 mg; Inject 1 mL (6 mg total) into the muscle one time.  -     methylPREDNISolone acetate injection 80 mg; Inject 1 mL (80 mg total) into the muscle one time.  -     dexamethasone (DECADRON) 1 MG Tab; Take 1 tablet (1 mg total) by mouth once daily.  Dispense: 30 tablet; Refill: 4  -     Comprehensive metabolic panel; Future; Expected date: 08/29/2018  -     C-reactive protein; Future; Expected date: 08/29/2018  -     Sedimentation rate; Future; Expected date: 08/29/2018  -     CBC auto differential; Future; Expected date: 08/29/2018  -     Thiopurine Methyltrans (TPMT) Genotyping; Future; Expected date: 08/29/2018    Seronegative rheumatoid arthritis  Comments:  consider Rituxan as a tx option prn decadron and acthar    Other orders  -     azaTHIOprine (IMURAN) 50 mg Tab; Take 1 tablet (50 mg total) by mouth once daily.  Dispense: 30 tablet; Refill: 3         Consider Ocrevus will help MS, pt will talk to neurology about the medical marijuana  Pt has tried and failed Humira, enbrel, methotrexate prednisone, azulfidine, plaquenil and newly started imuran  If unable to adjust medication we will pursue Rituxan

## 2018-10-12 DIAGNOSIS — G35 MS (MULTIPLE SCLEROSIS): ICD-10-CM

## 2018-10-12 DIAGNOSIS — M25.579 ANKLE PAIN, UNSPECIFIED CHRONICITY, UNSPECIFIED LATERALITY: ICD-10-CM

## 2018-10-12 DIAGNOSIS — Z79.631 ENCOUNTER FOR METHOTREXATE MONITORING: ICD-10-CM

## 2018-10-12 DIAGNOSIS — R23.2 HOT FLASHES: ICD-10-CM

## 2018-10-12 DIAGNOSIS — E87.6 HYPOKALEMIA: ICD-10-CM

## 2018-10-12 DIAGNOSIS — L40.50 PSORIATIC ARTHRITIS: ICD-10-CM

## 2018-10-12 DIAGNOSIS — G35 MULTIPLE SCLEROSIS: ICD-10-CM

## 2018-10-12 DIAGNOSIS — Z51.81 ENCOUNTER FOR METHOTREXATE MONITORING: ICD-10-CM

## 2018-10-12 DIAGNOSIS — H53.9 VISION CHANGES: ICD-10-CM

## 2018-10-12 DIAGNOSIS — L40.50 PSA (PSORIATIC ARTHRITIS): ICD-10-CM

## 2018-10-13 RX ORDER — GABAPENTIN 300 MG/1
300 CAPSULE ORAL 3 TIMES DAILY
Qty: 90 CAPSULE | Refills: 3 | Status: SHIPPED | OUTPATIENT
Start: 2018-10-13 | End: 2019-06-11 | Stop reason: SDUPTHER

## 2018-10-13 RX ORDER — DONEPEZIL HYDROCHLORIDE 5 MG/1
5 TABLET, FILM COATED ORAL NIGHTLY
Qty: 90 TABLET | Refills: 3 | Status: SHIPPED | OUTPATIENT
Start: 2018-10-13 | End: 2019-07-11

## 2018-10-16 ENCOUNTER — TELEPHONE (OUTPATIENT)
Dept: PHARMACY | Facility: CLINIC | Age: 48
End: 2018-10-16

## 2018-10-16 NOTE — TELEPHONE ENCOUNTER
Reason for call:    Notify patient PA required on specialty medication Acthar H.P.     Prescription has been sent to [facility] in [place] for processing.    Thanks,   Fernanda Arriaza  Patient Care Advocate   [facility]  Phone: [phone]  Fax: [phone]

## 2018-11-05 DIAGNOSIS — E87.6 HYPOKALEMIA: ICD-10-CM

## 2018-11-05 DIAGNOSIS — Z79.631 ENCOUNTER FOR METHOTREXATE MONITORING: ICD-10-CM

## 2018-11-05 DIAGNOSIS — Z51.81 ENCOUNTER FOR METHOTREXATE MONITORING: ICD-10-CM

## 2018-11-05 DIAGNOSIS — G35 MULTIPLE SCLEROSIS: ICD-10-CM

## 2018-11-05 DIAGNOSIS — M25.579 ANKLE PAIN, UNSPECIFIED CHRONICITY, UNSPECIFIED LATERALITY: ICD-10-CM

## 2018-11-05 DIAGNOSIS — H53.9 VISION CHANGES: ICD-10-CM

## 2018-11-05 DIAGNOSIS — L40.50 PSORIATIC ARTHRITIS: ICD-10-CM

## 2018-11-05 NOTE — TELEPHONE ENCOUNTER
----- Message from Jeanette Diego sent at 11/2/2018  3:53 PM CDT -----  FYI:  Acthar prior authorization has been approved through 4/19/19. Patient's insurance requires the patient to fill through [facility]. Please send prescription to [facility], which has been added to the patient's EPIC profile. Patient has been notified and provided with the necessary info to call and schedule a delivery.    To complete this in EPIC, the original order MUST be discontinued and re-typed as a new prescription with the updated pharmacy listed. Clicking reorder will continue to route the rx to [facility] even if the pharmacy is changed. Please opt the patient out of [facility] when the BPA is fired.    Thank you,  Jeanette Diego  Patient Care Advocate  [facility]  Ph: [phone]

## 2018-11-07 DIAGNOSIS — Z51.81 ENCOUNTER FOR METHOTREXATE MONITORING: ICD-10-CM

## 2018-11-07 DIAGNOSIS — L40.50 PSORIATIC ARTHRITIS: ICD-10-CM

## 2018-11-07 DIAGNOSIS — Z79.631 ENCOUNTER FOR METHOTREXATE MONITORING: ICD-10-CM

## 2018-11-07 DIAGNOSIS — H53.9 VISION CHANGES: ICD-10-CM

## 2018-11-07 DIAGNOSIS — D50.8 OTHER IRON DEFICIENCY ANEMIA: ICD-10-CM

## 2018-11-07 DIAGNOSIS — G35 MULTIPLE SCLEROSIS: ICD-10-CM

## 2018-11-07 NOTE — TELEPHONE ENCOUNTER
----- Message from Jeanette Diego sent at 11/6/2018  4:32 PM CST -----  Eduard Bianchi and Staff,     I faxed over the provider portion of the patient's Acthar copay assistance application to [phone] on 11/2/18. Was this ever received? We have not yet gotten it back.     Thank you,   Jeanette Diego   Patient Care Advocate   [facility]   Ph: [phone]

## 2018-11-07 NOTE — TELEPHONE ENCOUNTER
Form has been completed and office speaking with [facility] hub  regarding assistance

## 2018-11-09 ENCOUNTER — TELEPHONE (OUTPATIENT)
Dept: PHARMACY | Facility: CLINIC | Age: 48
End: 2018-11-09

## 2018-11-09 NOTE — TELEPHONE ENCOUNTER
Patient called to check on status of Acthar copay assistance application. I explained that Ms. Reid @ [facility] in [facility] is working on this assistance for her and I would send her a message for a status update.    Told patient I would have Jeanette call her as well.  Pt verbalized understanding.    Thanks,   Fernanda Arriaza CPhT, B.A  Patient Care Advocate   [facility]  Phone: [phone] Ext [phone]  Fax: [phone]

## 2018-11-12 DIAGNOSIS — R23.2 HOT FLASHES: ICD-10-CM

## 2018-12-11 DIAGNOSIS — R23.2 HOT FLASHES: ICD-10-CM

## 2018-12-11 DIAGNOSIS — R11.2 NAUSEA AND VOMITING, INTRACTABILITY OF VOMITING NOT SPECIFIED, UNSPECIFIED VOMITING TYPE: ICD-10-CM

## 2018-12-11 DIAGNOSIS — G35 MULTIPLE SCLEROSIS: ICD-10-CM

## 2018-12-11 DIAGNOSIS — E87.6 HYPOKALEMIA: ICD-10-CM

## 2018-12-11 DIAGNOSIS — Z79.631 ENCOUNTER FOR METHOTREXATE MONITORING: ICD-10-CM

## 2018-12-11 DIAGNOSIS — M25.579 ANKLE PAIN, UNSPECIFIED CHRONICITY, UNSPECIFIED LATERALITY: ICD-10-CM

## 2018-12-11 DIAGNOSIS — Z51.81 ENCOUNTER FOR METHOTREXATE MONITORING: ICD-10-CM

## 2018-12-11 DIAGNOSIS — H53.9 VISION CHANGES: ICD-10-CM

## 2018-12-11 DIAGNOSIS — L40.50 PSORIATIC ARTHRITIS: ICD-10-CM

## 2018-12-12 RX ORDER — CYANOCOBALAMIN 1000 UG/ML
INJECTION, SOLUTION INTRAMUSCULAR; SUBCUTANEOUS
Qty: 10 ML | Refills: 0 | Status: SHIPPED | OUTPATIENT
Start: 2018-12-12 | End: 2019-07-02

## 2018-12-12 RX ORDER — ONDANSETRON 4 MG/1
4 TABLET, ORALLY DISINTEGRATING ORAL EVERY 6 HOURS PRN
Qty: 12 TABLET | Refills: 3 | Status: ON HOLD | OUTPATIENT
Start: 2018-12-12 | End: 2019-03-07 | Stop reason: HOSPADM

## 2019-02-08 ENCOUNTER — TELEPHONE (OUTPATIENT)
Dept: RHEUMATOLOGY | Facility: CLINIC | Age: 49
End: 2019-02-08

## 2019-02-20 ENCOUNTER — PATIENT MESSAGE (OUTPATIENT)
Dept: RHEUMATOLOGY | Facility: CLINIC | Age: 49
End: 2019-02-20

## 2019-02-26 ENCOUNTER — TELEPHONE (OUTPATIENT)
Dept: RHEUMATOLOGY | Facility: CLINIC | Age: 49
End: 2019-02-26

## 2019-03-01 ENCOUNTER — OFFICE VISIT (OUTPATIENT)
Dept: RHEUMATOLOGY | Facility: CLINIC | Age: 49
End: 2019-03-01
Payer: COMMERCIAL

## 2019-03-01 VITALS
HEIGHT: 63 IN | DIASTOLIC BLOOD PRESSURE: 88 MMHG | SYSTOLIC BLOOD PRESSURE: 125 MMHG | BODY MASS INDEX: 41.46 KG/M2 | HEART RATE: 97 BPM | WEIGHT: 234 LBS

## 2019-03-01 DIAGNOSIS — L40.50 PSORIATIC ARTHRITIS: ICD-10-CM

## 2019-03-01 DIAGNOSIS — E87.6 HYPOKALEMIA: ICD-10-CM

## 2019-03-01 DIAGNOSIS — H46.9 OPTIC NEURITIS DUE TO MULTIPLE SCLEROSIS: ICD-10-CM

## 2019-03-01 DIAGNOSIS — G43.601 PERSISTENT MIGRAINE AURA WITH CEREBRAL INFARCTION AND STATUS MIGRAINOSUS, NOT INTRACTABLE: ICD-10-CM

## 2019-03-01 DIAGNOSIS — R59.1 LYMPHADENOPATHY: Primary | ICD-10-CM

## 2019-03-01 DIAGNOSIS — Z79.631 ENCOUNTER FOR METHOTREXATE MONITORING: ICD-10-CM

## 2019-03-01 DIAGNOSIS — J18.9 PNEUMONIA DUE TO INFECTIOUS ORGANISM, UNSPECIFIED LATERALITY, UNSPECIFIED PART OF LUNG: ICD-10-CM

## 2019-03-01 DIAGNOSIS — G35 MULTIPLE SCLEROSIS: ICD-10-CM

## 2019-03-01 DIAGNOSIS — Z51.81 ENCOUNTER FOR METHOTREXATE MONITORING: ICD-10-CM

## 2019-03-01 DIAGNOSIS — R23.2 HOT FLASHES: ICD-10-CM

## 2019-03-01 DIAGNOSIS — L40.50 PSA (PSORIATIC ARTHRITIS): ICD-10-CM

## 2019-03-01 DIAGNOSIS — H53.9 VISION CHANGES: ICD-10-CM

## 2019-03-01 DIAGNOSIS — G35 OPTIC NEURITIS DUE TO MULTIPLE SCLEROSIS: ICD-10-CM

## 2019-03-01 DIAGNOSIS — I63.9 PERSISTENT MIGRAINE AURA WITH CEREBRAL INFARCTION AND STATUS MIGRAINOSUS, NOT INTRACTABLE: ICD-10-CM

## 2019-03-01 DIAGNOSIS — M06.09 RHEUMATOID ARTHRITIS OF MULTIPLE SITES WITH NEGATIVE RHEUMATOID FACTOR: ICD-10-CM

## 2019-03-01 DIAGNOSIS — M25.579 ANKLE PAIN, UNSPECIFIED CHRONICITY, UNSPECIFIED LATERALITY: ICD-10-CM

## 2019-03-01 DIAGNOSIS — K22.89 OTHER SPECIFIED DISEASES OF ESOPHAGUS: ICD-10-CM

## 2019-03-01 PROCEDURE — 96372 THER/PROPH/DIAG INJ SC/IM: CPT | Mod: S$GLB,,, | Performed by: INTERNAL MEDICINE

## 2019-03-01 PROCEDURE — 99215 PR OFFICE/OUTPT VISIT, EST, LEVL V, 40-54 MIN: ICD-10-PCS | Mod: 25,S$GLB,, | Performed by: INTERNAL MEDICINE

## 2019-03-01 PROCEDURE — 3008F PR BODY MASS INDEX (BMI) DOCUMENTED: ICD-10-PCS | Mod: CPTII,S$GLB,, | Performed by: INTERNAL MEDICINE

## 2019-03-01 PROCEDURE — 96372 PR INJECTION,THERAP/PROPH/DIAG2ST, IM OR SUBCUT: ICD-10-PCS | Mod: S$GLB,,, | Performed by: INTERNAL MEDICINE

## 2019-03-01 PROCEDURE — 99999 PR PBB SHADOW E&M-EST. PATIENT-LVL III: ICD-10-PCS | Mod: PBBFAC,,, | Performed by: INTERNAL MEDICINE

## 2019-03-01 PROCEDURE — 99999 PR PBB SHADOW E&M-EST. PATIENT-LVL III: CPT | Mod: PBBFAC,,, | Performed by: INTERNAL MEDICINE

## 2019-03-01 PROCEDURE — 3008F BODY MASS INDEX DOCD: CPT | Mod: CPTII,S$GLB,, | Performed by: INTERNAL MEDICINE

## 2019-03-01 PROCEDURE — 99215 OFFICE O/P EST HI 40 MIN: CPT | Mod: 25,S$GLB,, | Performed by: INTERNAL MEDICINE

## 2019-03-01 RX ORDER — EMPAGLIFLOZIN 10 MG/1
1 TABLET, FILM COATED ORAL DAILY
COMMUNITY
Start: 2019-02-04 | End: 2020-09-03

## 2019-03-01 RX ORDER — ROPINIROLE 2 MG/1
2 TABLET, FILM COATED ORAL NIGHTLY
Qty: 30 TABLET | Refills: 4 | Status: SHIPPED | OUTPATIENT
Start: 2019-03-01 | End: 2019-08-01

## 2019-03-01 RX ORDER — SODIUM CHLORIDE 0.9 % (FLUSH) 0.9 %
10 SYRINGE (ML) INJECTION
Status: CANCELLED | OUTPATIENT
Start: 2019-03-01

## 2019-03-01 RX ORDER — ALPRAZOLAM 0.5 MG/1
0.5 TABLET ORAL 2 TIMES DAILY PRN
Qty: 60 TABLET | Refills: 3 | Status: SHIPPED | OUTPATIENT
Start: 2019-03-01 | End: 2019-07-19 | Stop reason: SDUPTHER

## 2019-03-01 RX ORDER — FLUCONAZOLE 200 MG/1
200 TABLET ORAL DAILY
Qty: 10 TABLET | Refills: 3 | Status: ON HOLD | OUTPATIENT
Start: 2019-03-01 | End: 2019-03-07 | Stop reason: HOSPADM

## 2019-03-01 RX ORDER — FAMOTIDINE 10 MG/ML
20 INJECTION INTRAVENOUS
Status: CANCELLED | OUTPATIENT
Start: 2019-03-01

## 2019-03-01 RX ORDER — HEPARIN 100 UNIT/ML
500 SYRINGE INTRAVENOUS
Status: CANCELLED | OUTPATIENT
Start: 2019-03-01

## 2019-03-01 RX ORDER — CEFTRIAXONE 1 G/1
1 INJECTION, POWDER, FOR SOLUTION INTRAMUSCULAR; INTRAVENOUS
Status: COMPLETED | OUTPATIENT
Start: 2019-03-01 | End: 2019-03-01

## 2019-03-01 RX ORDER — TOPIRAMATE 50 MG/1
50 CAPSULE, EXTENDED RELEASE ORAL DAILY
Qty: 30 CAPSULE | Refills: 6 | Status: SHIPPED | OUTPATIENT
Start: 2019-03-01 | End: 2019-07-11

## 2019-03-01 RX ORDER — LEVOFLOXACIN 500 MG/1
500 TABLET, FILM COATED ORAL DAILY
Qty: 10 TABLET | Refills: 0 | Status: ON HOLD | OUTPATIENT
Start: 2019-03-01 | End: 2019-03-07 | Stop reason: HOSPADM

## 2019-03-01 RX ORDER — AZATHIOPRINE 50 MG/1
50 TABLET ORAL DAILY
Qty: 30 TABLET | Refills: 3 | Status: SHIPPED | OUTPATIENT
Start: 2019-03-01 | End: 2019-07-02

## 2019-03-01 RX ORDER — FOLIC ACID 1 MG/1
1000 TABLET ORAL DAILY
Qty: 30 TABLET | Refills: 3 | Status: SHIPPED | OUTPATIENT
Start: 2019-03-01 | End: 2019-07-02

## 2019-03-01 RX ORDER — ACETAMINOPHEN 325 MG/1
650 TABLET ORAL
Status: CANCELLED | OUTPATIENT
Start: 2019-03-01

## 2019-03-01 RX ORDER — CITALOPRAM 40 MG/1
40 TABLET, FILM COATED ORAL DAILY
Qty: 30 TABLET | Refills: 4 | Status: SHIPPED | OUTPATIENT
Start: 2019-03-01 | End: 2019-08-01

## 2019-03-01 RX ADMIN — CEFTRIAXONE 1 G: 1 INJECTION, POWDER, FOR SOLUTION INTRAMUSCULAR; INTRAVENOUS at 10:03

## 2019-03-01 ASSESSMENT — ROUTINE ASSESSMENT OF PATIENT INDEX DATA (RAPID3)
MDHAQ FUNCTION SCORE: .5
PAIN SCORE: 10
PATIENT GLOBAL ASSESSMENT SCORE: 10
TOTAL RAPID3 SCORE: 7.22
PSYCHOLOGICAL DISTRESS SCORE: 3.3

## 2019-03-01 NOTE — PROGRESS NOTES
Subjective:       Patient ID: Kayla Small is a 48 y.o. female.    Chief Complaint: Multiple Sclerosis and Hypokalemia    Follow up: Psoiatic arthritis, MS , she had an infection, she is having arthritis flares and in pain. She has been ill for months. Patient complains of arthralgias and myalgias for which has been present for a few years. Pain is located in multiple joints, both shoulder(s), both elbow(s), both wrist(s), both MCP(s): 1st, 2nd, 3rd, 4th and 5th, both PIP(s): 1st, 2nd, 3rd, 4th and 5th, both DIP(s): 1st and 2nd, both hip(s), both knee(s) and both MTP(s): 1st, 2nd, 3rd, 4th and 5th, is described as aching, pulsating, shooting and throbbing, and is constant, moderate .  On Imuran , mtx and pl      Review of Systems   Constitutional: Positive for activity change. Negative for appetite change, chills, diaphoresis and unexpected weight change.   HENT: Negative for congestion, dental problem, ear discharge, ear pain, facial swelling, mouth sores, nosebleeds, postnasal drip, rhinorrhea, sinus pressure, sneezing, sore throat, tinnitus and voice change.    Eyes: Negative for photophobia, pain, discharge, redness and itching.   Respiratory: Negative for apnea, cough, chest tightness, shortness of breath and wheezing.    Cardiovascular: Positive for leg swelling. Negative for chest pain and palpitations.   Gastrointestinal: Negative for abdominal distention, abdominal pain, constipation, diarrhea, nausea and vomiting.   Endocrine: Negative for cold intolerance, heat intolerance, polydipsia and polyuria.   Genitourinary: Negative for decreased urine volume, difficulty urinating, flank pain, frequency, hematuria and urgency.   Musculoskeletal: Positive for arthralgias, back pain, gait problem, neck pain and neck stiffness.   Skin: Negative for pallor, rash and wound.   Allergic/Immunologic: Negative for immunocompromised state.   Neurological: Negative for dizziness, tremors, weakness and numbness.  "  Hematological: Negative for adenopathy. Does not bruise/bleed easily.   Psychiatric/Behavioral: Negative for sleep disturbance. The patient is not nervous/anxious.          Objective:     /88 (BP Location: Left arm, Patient Position: Sitting, BP Method: Medium (Automatic))   Pulse 97   Ht 5' 3" (1.6 m)   Wt 106.1 kg (234 lb)   BMI 41.45 kg/m²      Physical Exam   Nursing note and vitals reviewed.  Constitutional: She is oriented to person, place, and time. She appears distressed.   HENT:   Head: Normocephalic and atraumatic.   Mouth/Throat: Oropharynx is clear and moist.   Eyes: EOM are normal. Pupils are equal, round, and reactive to light.   Neck: Neck supple. No thyromegaly present.   Cardiovascular: Normal rate, regular rhythm and normal heart sounds.  Exam reveals no gallop and no friction rub.    No murmur heard.  Pulmonary/Chest: Breath sounds normal. She has no wheezes. She has no rales. She exhibits no tenderness.   Abdominal: There is no tenderness. There is no rebound and no guarding.       Right Side Rheumatological Exam     Examination finds the elbow normal.    The patient is tender to palpation of the shoulder, wrist, knee, 1st PIP, 1st MCP, 2nd PIP, 2nd MCP, 3rd PIP, 3rd MCP, 4th PIP, 4th MCP, 5th PIP and 5th MCP    She has swelling of the 1st PIP, 1st MCP, 2nd PIP, 2nd MCP, 3rd PIP, 3rd MCP, 4th PIP, 4th MCP, 5th PIP and 5th MCP    Shoulder Exam   Tenderness Location: no tenderness    Range of Motion   Active abduction: abnormal   Adduction: abnormal  Sensation: normal    Knee Exam   Patellofemoral Crepitus: positive  Effusion: negative  Sensation: normal    Hip Exam   Tenderness Location: posterior  Sensation: normal    Elbow/Wrist Exam   Tenderness Location: no tenderness  Sensation: normal    Left Side Rheumatological Exam     Examination finds the elbow normal.    The patient is tender to palpation of the shoulder, wrist, knee, 1st PIP, 1st MCP, 2nd PIP, 2nd MCP, 3rd PIP, 3rd MCP, " 4th PIP, 4th MCP, 5th PIP and 5th MCP.    She has swelling of the 1st PIP, 1st MCP, 2nd PIP, 2nd MCP, 3rd PIP, 3rd MCP, 4th PIP, 4th MCP, 5th PIP and 5th MCP    Shoulder Exam   Tenderness Location: no tenderness    Range of Motion   Active abduction: abnormal   Sensation: normal    Knee Exam     Patellofemoral Crepitus: positive  Effusion: negative  Sensation: normal    Hip Exam   Tenderness Location: posterior  Sensation: normal    Elbow/Wrist Exam   Sensation: normal      Back/Neck Exam   General Inspection   Gait: normal         Lymphadenopathy:     She has no cervical adenopathy.   Neurological: She is alert and oriented to person, place, and time. Gait normal.   Skin: No rash noted. No erythema. No pallor.     Psychiatric: Mood and affect normal.   Musculoskeletal: She exhibits tenderness and deformity. She exhibits no edema.           Results for orders placed or performed in visit on 01/30/19   Comprehensive metabolic panel   Result Value Ref Range    Sodium 140 136 - 145 mmol/L    Potassium 3.7 3.5 - 5.1 mmol/L    Chloride 108 95 - 110 mmol/L    CO2 27 22 - 31 mmol/L    Glucose 103 70 - 110 mg/dL    BUN, Bld 8 7 - 18 mg/dL    Creatinine 0.65 0.50 - 1.40 mg/dL    Calcium 9.2 8.4 - 10.2 mg/dL    Total Protein 6.3 6.0 - 8.4 g/dL    Albumin 3.6 3.5 - 5.2 g/dL    Total Bilirubin 0.5 0.2 - 1.3 mg/dL    Alkaline Phosphatase 168 (H) 38 - 145 U/L    AST 41 (H) 14 - 36 U/L    ALT 43 10 - 44 U/L    Anion Gap 5 (L) 8 - 16 mmol/L    eGFR if African American >60 >60 mL/min/1.73 m^2    eGFR if non African American >60 >60 mL/min/1.73 m^2   C-reactive protein   Result Value Ref Range    CRP 7.00 (H) 0.00 - 0.90 mg/dL   Sedimentation rate   Result Value Ref Range    Sed Rate 39 (H) 0 - 19 mm/Hr   CBC auto differential   Result Value Ref Range    WBC 5.13 3.90 - 12.70 K/uL    RBC 4.21 4.00 - 5.40 M/uL    Hemoglobin 11.4 (L) 12.0 - 16.0 g/dL    Hematocrit 35.9 (L) 37.0 - 48.5 %    MCV 85 82 - 98 fL    MCH 27.1 27.0 - 31.0 pg     MCHC 31.8 (L) 32.0 - 36.0 g/dL    RDW 16.8 (H) 11.5 - 14.5 %    Platelets 345 150 - 350 K/uL    MPV 10.7 9.2 - 12.9 fL    Gran # (ANC) 3.9 1.8 - 7.7 K/uL    Lymph # 0.8 (L) 1.0 - 4.8 K/uL    Mono # 0.4 0.3 - 1.0 K/uL    Eos # 0.0 0.0 - 0.5 K/uL    Baso # 0.01 0.00 - 0.20 K/uL    nRBC 0 0 /100 WBC    Gran% 76.0 (H) 38.0 - 73.0 %    Lymph% 16.2 (L) 18.0 - 48.0 %    Mono% 7.6 4.0 - 15.0 %    Eosinophil% 0.0 0.0 - 8.0 %    Basophil% 0.2 0.0 - 1.9 %    Differential Method Automated    Thiopurine Methyltrans (TPMT) Genotyping   Result Value Ref Range    TPMT Genotype Specimen Whole Blood     TPMT Genotype Result Neg/Neg     TPMT Phenotype Normal    Basic metabolic panel   Result Value Ref Range    Sodium 139 136 - 145 mmol/L    Potassium 3.8 3.5 - 5.1 mmol/L    Chloride 108 95 - 110 mmol/L    CO2 26 22 - 31 mmol/L    Glucose 104 70 - 110 mg/dL    BUN, Bld 8 7 - 18 mg/dL    Creatinine 0.60 0.50 - 1.40 mg/dL    Calcium 9.3 8.4 - 10.2 mg/dL    Anion Gap 5 (L) 8 - 16 mmol/L    eGFR if African American >60 >60 mL/min/1.73 m^2    eGFR if non African American >60 >60 mL/min/1.73 m^2   Magnesium   Result Value Ref Range    Magnesium 2.2 1.6 - 2.6 mg/dL   TSH   Result Value Ref Range    TSH 0.040 (L) 0.400 - 4.000 uIU/mL   T3, free   Result Value Ref Range    T3, Free 4.02 2.77 - 5.27 pg/mL   T4, free   Result Value Ref Range    Free T4 1.70 0.78 - 2.19 ng/dL     Sed Rate0 - 19 mm/Hr39 Abnormally high    Thiopurine Methyltrans (TPMT) Genotyping   Order: [id number]   Status:  Final result   Visible to patient:  Yes (Patient Portal) Next appt:  None Dx:  Multiple sclerosis; Psoriatic arthrit...   Component 1mo ago   TPMT Genotype Specimen Whole Blood    TPMT Genotype Result Neg/Neg    TPMT Phenotype Normal    Comment: Interpretation: None of the analyzed thiopurine                Assessment:     Encounter Diagnoses   Name Primary?    Lymphadenopathy Yes    Multiple sclerosis     Psoriatic arthritis     Optic neuritis due to  multiple sclerosis     Pneumonia due to infectious organism, unspecified laterality, unspecified part of lung     Rheumatoid arthritis of multiple sites with negative rheumatoid factor     Other specified diseases of esophagus         Plan:     Lymphadenopathy  -     cefTRIAXone injection 1 g  -     Comprehensive metabolic panel; Future; Expected date: 03/01/2019  -     CBC auto differential; Future; Expected date: 03/01/2019  -     Sedimentation rate; Future; Expected date: 03/01/2019  -     IgE; Future; Expected date: 03/15/2019  -     IgG; Future; Expected date: 03/01/2019  -     IgG 1, 2, 3, and 4; Future; Expected date: 03/01/2019  -     IgM; Future; Expected date: 03/01/2019  -     IgA; Future; Expected date: 03/01/2019  -     Humoral Immune Eval (Pneumo Serotypes) With H. Flu; Future; Expected date: 03/01/2019  -     Lymphocyte Profile II; Future; Expected date: 03/01/2019  -     Hepatitis B core antibody, IgM; Future; Expected date: 03/01/2019  -     Hepatitis C antibody; Future; Expected date: 03/01/2019  -     CT Neck Chest Without Contrast (XPD); Future; Expected date: 03/01/2019    Multiple sclerosis  -     cefTRIAXone injection 1 g  -     Comprehensive metabolic panel; Future; Expected date: 03/01/2019  -     CBC auto differential; Future; Expected date: 03/01/2019  -     Sedimentation rate; Future; Expected date: 03/01/2019  -     IgE; Future; Expected date: 03/15/2019  -     IgG; Future; Expected date: 03/01/2019  -     IgG 1, 2, 3, and 4; Future; Expected date: 03/01/2019  -     IgM; Future; Expected date: 03/01/2019  -     IgA; Future; Expected date: 03/01/2019  -     Humoral Immune Eval (Pneumo Serotypes) With H. Flu; Future; Expected date: 03/01/2019  -     Lymphocyte Profile II; Future; Expected date: 03/01/2019  -     Hepatitis B core antibody, IgM; Future; Expected date: 03/01/2019  -     Hepatitis C antibody; Future; Expected date: 03/01/2019  -     CT Neck Chest Without Contrast (XPD);  Future; Expected date: 03/01/2019    Psoriatic arthritis  -     cefTRIAXone injection 1 g  -     Comprehensive metabolic panel; Future; Expected date: 03/01/2019  -     CBC auto differential; Future; Expected date: 03/01/2019  -     Sedimentation rate; Future; Expected date: 03/01/2019  -     IgE; Future; Expected date: 03/15/2019  -     IgG; Future; Expected date: 03/01/2019  -     IgG 1, 2, 3, and 4; Future; Expected date: 03/01/2019  -     IgM; Future; Expected date: 03/01/2019  -     IgA; Future; Expected date: 03/01/2019  -     Humoral Immune Eval (Pneumo Serotypes) With H. Flu; Future; Expected date: 03/01/2019  -     Lymphocyte Profile II; Future; Expected date: 03/01/2019  -     Hepatitis B core antibody, IgM; Future; Expected date: 03/01/2019  -     Hepatitis C antibody; Future; Expected date: 03/01/2019  -     CT Neck Chest Without Contrast (XPD); Future; Expected date: 03/01/2019    Optic neuritis due to multiple sclerosis  -     cefTRIAXone injection 1 g  -     Comprehensive metabolic panel; Future; Expected date: 03/01/2019  -     CBC auto differential; Future; Expected date: 03/01/2019  -     Sedimentation rate; Future; Expected date: 03/01/2019  -     IgE; Future; Expected date: 03/15/2019  -     IgG; Future; Expected date: 03/01/2019  -     IgG 1, 2, 3, and 4; Future; Expected date: 03/01/2019  -     IgM; Future; Expected date: 03/01/2019  -     IgA; Future; Expected date: 03/01/2019  -     Humoral Immune Eval (Pneumo Serotypes) With H. Flu; Future; Expected date: 03/01/2019  -     Lymphocyte Profile II; Future; Expected date: 03/01/2019    Pneumonia due to infectious organism, unspecified laterality, unspecified part of lung  -     cefTRIAXone injection 1 g  -     Comprehensive metabolic panel; Future; Expected date: 03/01/2019  -     CBC auto differential; Future; Expected date: 03/01/2019  -     Sedimentation rate; Future; Expected date: 03/01/2019  -     IgE; Future; Expected date: 03/15/2019  -      IgG; Future; Expected date: 03/01/2019  -     IgG 1, 2, 3, and 4; Future; Expected date: 03/01/2019  -     IgM; Future; Expected date: 03/01/2019  -     IgA; Future; Expected date: 03/01/2019  -     Humoral Immune Eval (Pneumo Serotypes) With H. Flu; Future; Expected date: 03/01/2019  -     Lymphocyte Profile II; Future; Expected date: 03/01/2019  -     levoFLOXacin (LEVAQUIN) 500 MG tablet; Take 1 tablet (500 mg total) by mouth once daily. for 10 days  Dispense: 10 tablet; Refill: 0    Rheumatoid arthritis of multiple sites with negative rheumatoid factor  -     Hepatitis B core antibody, IgM; Future; Expected date: 03/01/2019  -     Hepatitis C antibody; Future; Expected date: 03/01/2019  -     CT Neck Chest Without Contrast (XPD); Future; Expected date: 03/01/2019    Other specified diseases of esophagus  -     CT Neck Chest Without Contrast (XPD); Future; Expected date: 03/01/2019    Other orders  -     diphenhydrAMINE (BENADRYL) 25 mg in sodium chloride 0.9% 50 mL IVPB  -     acetaminophen tablet 650 mg  -     famotidine (PF) injection 20 mg  -     Cancel: methylPREDNISolone sodium succinate (SOLU-MEDROL) 100 mg in dextrose 5 % 100 mL IVPB  -     riTUXimab (RITUXAN) 1,000 mg in sodium chloride 0.9% 1,000 mL infusion (conc: 1 mg/mL)  -     sodium chloride 0.9% flush 10 mL  -     heparin, porcine (PF) 100 unit/mL injection flush 500 Units  -     methylPREDNISolone sodium succinate (SOLU-MEDROL) 125 mg in dextrose 5 % 100 mL IVPB         Consider IVIG and Rituxan to treat immune defiency and RA  DC Methotrexate lft are elevated and consider dc Imuran,  Continue acthar  Optic neuritis

## 2019-03-01 NOTE — PROGRESS NOTES
Administered 1 gram of rocephin mixed with 2.1 ml of lidocaine. Given in the right upper outer gluteal. Informed of s/s to report verbalized understanding. No adverse reactions noted.    Lot # [id number]  Expiration jun 2021

## 2019-03-04 ENCOUNTER — TELEPHONE (OUTPATIENT)
Dept: RHEUMATOLOGY | Facility: CLINIC | Age: 49
End: 2019-03-04

## 2019-03-04 PROBLEM — T40.601A OVERDOSE OF OPIATE OR RELATED NARCOTIC: Status: ACTIVE | Noted: 2019-03-04

## 2019-03-04 PROBLEM — T50.901A OVERDOSE: Status: ACTIVE | Noted: 2019-03-04

## 2019-03-04 PROBLEM — T42.4X1A OVERDOSE OF BENZODIAZEPINE: Status: ACTIVE | Noted: 2019-03-04

## 2019-03-04 PROBLEM — G93.40 ACUTE ENCEPHALOPATHY: Status: ACTIVE | Noted: 2019-03-04

## 2019-03-04 NOTE — TELEPHONE ENCOUNTER
----- Message from Di Stratton sent at 3/4/2019 11:17 AM CST -----  Contact: Lindy with [facility]   Lindy with [facility] missed a call from your office on Friday, please call back at [phone]

## 2019-03-05 PROBLEM — E87.6 HYPOKALEMIA: Status: RESOLVED | Noted: 2017-04-18 | Resolved: 2019-03-05

## 2019-03-05 PROBLEM — R94.31 ABNORMAL ECG: Status: RESOLVED | Noted: 2017-04-18 | Resolved: 2019-03-05

## 2019-03-07 ENCOUNTER — TELEPHONE (OUTPATIENT)
Dept: NEUROLOGY | Facility: CLINIC | Age: 49
End: 2019-03-07

## 2019-03-07 PROBLEM — T50.901A DRUG OVERDOSE: Status: ACTIVE | Noted: 2019-03-04

## 2019-03-07 NOTE — TELEPHONE ENCOUNTER
----- Message from Ellen Veras sent at 3/7/2019  1:27 PM CST -----  Contact: pt @ [phone]  Calling to schedule an appt with Dr. Holman within 2 weeks. Please call.

## 2019-03-08 ENCOUNTER — PATIENT MESSAGE (OUTPATIENT)
Dept: RHEUMATOLOGY | Facility: CLINIC | Age: 49
End: 2019-03-08

## 2019-03-19 ENCOUNTER — DOCUMENTATION ONLY (OUTPATIENT)
Dept: INFUSION THERAPY | Facility: HOSPITAL | Age: 49
End: 2019-03-19

## 2019-03-19 ENCOUNTER — PATIENT MESSAGE (OUTPATIENT)
Dept: RHEUMATOLOGY | Facility: CLINIC | Age: 49
End: 2019-03-19

## 2019-03-19 ENCOUNTER — TELEPHONE (OUTPATIENT)
Dept: INFUSION THERAPY | Facility: HOSPITAL | Age: 49
End: 2019-03-19

## 2019-03-22 NOTE — TELEPHONE ENCOUNTER
Spoke with [facility] about pt's acthar. They report that they have not been able to reach pt and are ready to cancel order if they do not hear from pt soon to set up delivery. Advised will reach out to pt and set up delivery.

## 2019-03-22 NOTE — TELEPHONE ENCOUNTER
----- Message from Ana Camilo sent at 3/22/2019  1:25 PM CDT -----  Contact: [facility] ph#[phone]  [facility] ph#[phone]  Fax#[phone]  Requesting additional phone # on patient, unable to reach patient

## 2019-04-01 ENCOUNTER — TELEPHONE (OUTPATIENT)
Dept: PHARMACY | Facility: CLINIC | Age: 49
End: 2019-04-01

## 2019-04-01 NOTE — TELEPHONE ENCOUNTER
Reason for call:  Notify patient pa on Nucynta approved by outside provider.    Thanks,   Fernanda Arriaza CPhT, B.A  Patient Care Advocate   [facility]   Phone: [phone] Ext [phone]  Fax: [phone]

## 2019-05-01 DIAGNOSIS — Z51.81 ENCOUNTER FOR METHOTREXATE MONITORING: ICD-10-CM

## 2019-05-01 DIAGNOSIS — M25.579 ANKLE PAIN, UNSPECIFIED CHRONICITY, UNSPECIFIED LATERALITY: ICD-10-CM

## 2019-05-01 DIAGNOSIS — Z79.631 ENCOUNTER FOR METHOTREXATE MONITORING: ICD-10-CM

## 2019-05-01 DIAGNOSIS — G35 MULTIPLE SCLEROSIS: ICD-10-CM

## 2019-05-01 DIAGNOSIS — L40.50 PSORIATIC ARTHRITIS: ICD-10-CM

## 2019-05-01 DIAGNOSIS — H53.9 VISION CHANGES: ICD-10-CM

## 2019-05-01 DIAGNOSIS — E87.6 HYPOKALEMIA: ICD-10-CM

## 2019-05-02 RX ORDER — CELECOXIB 200 MG/1
200 CAPSULE ORAL DAILY
Qty: 30 CAPSULE | Refills: 5 | Status: SHIPPED | OUTPATIENT
Start: 2019-05-02 | End: 2019-11-14 | Stop reason: SDUPTHER

## 2019-05-15 DIAGNOSIS — H53.9 VISION CHANGES: ICD-10-CM

## 2019-05-15 DIAGNOSIS — G35 MULTIPLE SCLEROSIS: ICD-10-CM

## 2019-05-15 DIAGNOSIS — Z51.81 ENCOUNTER FOR METHOTREXATE MONITORING: ICD-10-CM

## 2019-05-15 DIAGNOSIS — L40.50 PSORIATIC ARTHRITIS: ICD-10-CM

## 2019-05-15 DIAGNOSIS — R23.2 HOT FLASHES: ICD-10-CM

## 2019-05-15 DIAGNOSIS — Z79.631 ENCOUNTER FOR METHOTREXATE MONITORING: ICD-10-CM

## 2019-05-15 DIAGNOSIS — M25.579 ANKLE PAIN, UNSPECIFIED CHRONICITY, UNSPECIFIED LATERALITY: ICD-10-CM

## 2019-05-15 DIAGNOSIS — E87.6 HYPOKALEMIA: ICD-10-CM

## 2019-05-15 RX ORDER — METHOTREXATE 2.5 MG/1
12.5 TABLET ORAL
Qty: 20 TABLET | Refills: 5 | Status: CANCELLED | OUTPATIENT
Start: 2019-05-15

## 2019-05-16 NOTE — TELEPHONE ENCOUNTER
----- Message from Lashonda Kohli sent at 5/16/2019 12:07 PM CDT -----  Patient Kayla Small MRN [id number] called the pharmacy yesterday requesting a refill on her methotrexate tablets. This medication had been discontinued in her chart. Ms. Garza states that she is still taking the medication, and she is now out of tablets.  If this is correct please send a new order to [facility]. Thank you.

## 2019-05-20 DIAGNOSIS — H53.9 VISION CHANGES: ICD-10-CM

## 2019-05-20 DIAGNOSIS — E87.6 HYPOKALEMIA: ICD-10-CM

## 2019-05-20 DIAGNOSIS — L40.50 PSORIATIC ARTHRITIS: ICD-10-CM

## 2019-05-20 DIAGNOSIS — G35 MULTIPLE SCLEROSIS: ICD-10-CM

## 2019-05-20 DIAGNOSIS — Z51.81 ENCOUNTER FOR METHOTREXATE MONITORING: ICD-10-CM

## 2019-05-20 DIAGNOSIS — M25.579 ANKLE PAIN, UNSPECIFIED CHRONICITY, UNSPECIFIED LATERALITY: ICD-10-CM

## 2019-05-20 DIAGNOSIS — Z79.631 ENCOUNTER FOR METHOTREXATE MONITORING: ICD-10-CM

## 2019-05-21 RX ORDER — METHOTREXATE 2.5 MG/1
12.5 TABLET ORAL
Qty: 20 TABLET | Refills: 5 | OUTPATIENT
Start: 2019-05-22

## 2019-06-11 DIAGNOSIS — G35 MS (MULTIPLE SCLEROSIS): ICD-10-CM

## 2019-06-11 DIAGNOSIS — L40.50 PSA (PSORIATIC ARTHRITIS): ICD-10-CM

## 2019-06-11 RX ORDER — GABAPENTIN 300 MG/1
300 CAPSULE ORAL 3 TIMES DAILY
Qty: 90 CAPSULE | Refills: 3 | Status: CANCELLED | OUTPATIENT
Start: 2019-06-11

## 2019-06-12 DIAGNOSIS — L40.50 PSA (PSORIATIC ARTHRITIS): ICD-10-CM

## 2019-06-12 DIAGNOSIS — G35 MS (MULTIPLE SCLEROSIS): ICD-10-CM

## 2019-06-16 RX ORDER — GABAPENTIN 300 MG/1
300 CAPSULE ORAL 3 TIMES DAILY
Qty: 90 CAPSULE | Refills: 3 | Status: SHIPPED | OUTPATIENT
Start: 2019-06-16 | End: 2019-11-14 | Stop reason: SDUPTHER

## 2019-07-02 DIAGNOSIS — M25.579 ANKLE PAIN, UNSPECIFIED CHRONICITY, UNSPECIFIED LATERALITY: ICD-10-CM

## 2019-07-02 DIAGNOSIS — Z79.631 ENCOUNTER FOR METHOTREXATE MONITORING: ICD-10-CM

## 2019-07-02 DIAGNOSIS — H53.9 VISION CHANGES: ICD-10-CM

## 2019-07-02 DIAGNOSIS — L40.50 PSORIATIC ARTHRITIS: ICD-10-CM

## 2019-07-02 DIAGNOSIS — Z51.81 ENCOUNTER FOR METHOTREXATE MONITORING: ICD-10-CM

## 2019-07-02 DIAGNOSIS — G35 MULTIPLE SCLEROSIS: ICD-10-CM

## 2019-07-02 DIAGNOSIS — E87.6 HYPOKALEMIA: ICD-10-CM

## 2019-07-02 DIAGNOSIS — L40.50 PSA (PSORIATIC ARTHRITIS): ICD-10-CM

## 2019-07-02 RX ORDER — AZATHIOPRINE 50 MG/1
50 TABLET ORAL DAILY
Qty: 30 TABLET | Refills: 3 | Status: SHIPPED | OUTPATIENT
Start: 2019-07-02 | End: 2019-11-14 | Stop reason: SDUPTHER

## 2019-07-02 RX ORDER — ALPRAZOLAM 0.5 MG/1
0.5 TABLET ORAL 2 TIMES DAILY PRN
Qty: 60 TABLET | Refills: 3 | Status: CANCELLED | OUTPATIENT
Start: 2019-07-02 | End: 2019-08-01

## 2019-07-02 RX ORDER — FOLIC ACID 1 MG/1
1000 TABLET ORAL DAILY
Qty: 30 TABLET | Refills: 3 | Status: SHIPPED | OUTPATIENT
Start: 2019-07-02 | End: 2019-11-14

## 2019-07-02 RX ORDER — CYANOCOBALAMIN 1000 UG/ML
INJECTION, SOLUTION INTRAMUSCULAR; SUBCUTANEOUS
Qty: 10 ML | Refills: 0 | Status: SHIPPED | OUTPATIENT
Start: 2019-07-02 | End: 2019-10-15

## 2019-07-10 ENCOUNTER — PATIENT MESSAGE (OUTPATIENT)
Dept: RHEUMATOLOGY | Facility: CLINIC | Age: 49
End: 2019-07-10

## 2019-07-12 RX ORDER — ALPRAZOLAM 0.5 MG/1
0.5 TABLET ORAL 2 TIMES DAILY PRN
Qty: 60 TABLET | Refills: 3 | OUTPATIENT
Start: 2019-07-12 | End: 2019-08-11

## 2019-07-12 NOTE — TELEPHONE ENCOUNTER
----- Message from Feliberto Granados sent at 7/12/2019 12:06 PM CDT -----  Contact: Danelle with [facility]  Type: Needs Medical Advice    Who Called:  Danelle    Daren Call Back Number: [phone]  Fax: [phone]  Additional Information: pt needs a PA for acthar gel. Please call to advise.

## 2019-07-16 ENCOUNTER — TELEPHONE (OUTPATIENT)
Dept: RHEUMATOLOGY | Facility: CLINIC | Age: 49
End: 2019-07-16

## 2019-07-16 DIAGNOSIS — G43.601 PERSISTENT MIGRAINE AURA WITH CEREBRAL INFARCTION AND STATUS MIGRAINOSUS, NOT INTRACTABLE: ICD-10-CM

## 2019-07-16 DIAGNOSIS — I63.9 PERSISTENT MIGRAINE AURA WITH CEREBRAL INFARCTION AND STATUS MIGRAINOSUS, NOT INTRACTABLE: ICD-10-CM

## 2019-07-16 RX ORDER — TOPIRAMATE 50 MG/1
50 CAPSULE, EXTENDED RELEASE ORAL DAILY
Qty: 30 CAPSULE | Refills: 6 | Status: SHIPPED | OUTPATIENT
Start: 2019-07-16 | End: 2020-11-17 | Stop reason: SDUPTHER

## 2019-07-16 RX ORDER — ALPRAZOLAM 0.5 MG/1
0.5 TABLET ORAL 2 TIMES DAILY PRN
Qty: 60 TABLET | Refills: 3 | OUTPATIENT
Start: 2019-07-16 | End: 2019-08-15

## 2019-07-16 RX ORDER — DONEPEZIL HYDROCHLORIDE 5 MG/1
5 TABLET, FILM COATED ORAL NIGHTLY
Qty: 90 TABLET | Refills: 3 | Status: SHIPPED | OUTPATIENT
Start: 2019-07-16 | End: 2020-11-17 | Stop reason: SDUPTHER

## 2019-07-16 NOTE — TELEPHONE ENCOUNTER
----- Message from Colin Kelly sent at 7/16/2019 10:09 AM CDT -----  Contact: [facility]  Type:  Pharmacy Calling to Clarify an RX    Name of Caller:  Danelle  Pharmacy Name:    [id number] - [place], [place] - [address]  [address] [address]  Phone: [phone] Fax: [phone]  Prescription Name:  corticotropin (ATHACAR H.P.) 80 unit/mL injectable gel  What do they need to clarify?:  Prior-Authorization  Best Call Back Number:  [phone]  Additional Information:  NA

## 2019-07-18 RX ORDER — ALPRAZOLAM 0.5 MG/1
0.5 TABLET ORAL 2 TIMES DAILY PRN
Qty: 60 TABLET | Refills: 3 | Status: CANCELLED | OUTPATIENT
Start: 2019-07-18 | End: 2019-08-17

## 2019-07-19 ENCOUNTER — OFFICE VISIT (OUTPATIENT)
Dept: RHEUMATOLOGY | Facility: CLINIC | Age: 49
End: 2019-07-19
Payer: COMMERCIAL

## 2019-07-19 VITALS
BODY MASS INDEX: 38.24 KG/M2 | HEIGHT: 64 IN | SYSTOLIC BLOOD PRESSURE: 126 MMHG | WEIGHT: 224 LBS | DIASTOLIC BLOOD PRESSURE: 87 MMHG

## 2019-07-19 DIAGNOSIS — I63.9 PERSISTENT MIGRAINE AURA WITH CEREBRAL INFARCTION AND STATUS MIGRAINOSUS, NOT INTRACTABLE: ICD-10-CM

## 2019-07-19 DIAGNOSIS — G35 MULTIPLE SCLEROSIS: Primary | ICD-10-CM

## 2019-07-19 DIAGNOSIS — G43.601 PERSISTENT MIGRAINE AURA WITH CEREBRAL INFARCTION AND STATUS MIGRAINOSUS, NOT INTRACTABLE: ICD-10-CM

## 2019-07-19 DIAGNOSIS — M06.09 RHEUMATOID ARTHRITIS OF MULTIPLE SITES WITH NEGATIVE RHEUMATOID FACTOR: ICD-10-CM

## 2019-07-19 DIAGNOSIS — L40.50 PSORIATIC ARTHRITIS: ICD-10-CM

## 2019-07-19 PROBLEM — T50.901A DRUG OVERDOSE: Status: RESOLVED | Noted: 2019-03-04 | Resolved: 2019-07-19

## 2019-07-19 PROBLEM — T42.4X1A OVERDOSE OF BENZODIAZEPINE: Status: RESOLVED | Noted: 2019-03-04 | Resolved: 2019-07-19

## 2019-07-19 PROBLEM — R41.82 ALTERED MENTAL STATUS: Status: RESOLVED | Noted: 2017-11-21 | Resolved: 2019-07-19

## 2019-07-19 PROCEDURE — 96372 PR INJECTION,THERAP/PROPH/DIAG2ST, IM OR SUBCUT: ICD-10-PCS | Mod: S$GLB,,, | Performed by: INTERNAL MEDICINE

## 2019-07-19 PROCEDURE — 99215 OFFICE O/P EST HI 40 MIN: CPT | Mod: 25,S$GLB,, | Performed by: INTERNAL MEDICINE

## 2019-07-19 PROCEDURE — 99999 PR PBB SHADOW E&M-EST. PATIENT-LVL II: CPT | Mod: PBBFAC,,, | Performed by: INTERNAL MEDICINE

## 2019-07-19 PROCEDURE — 99215 PR OFFICE/OUTPT VISIT, EST, LEVL V, 40-54 MIN: ICD-10-PCS | Mod: 25,S$GLB,, | Performed by: INTERNAL MEDICINE

## 2019-07-19 PROCEDURE — 3008F PR BODY MASS INDEX (BMI) DOCUMENTED: ICD-10-PCS | Mod: CPTII,S$GLB,, | Performed by: INTERNAL MEDICINE

## 2019-07-19 PROCEDURE — 99999 PR PBB SHADOW E&M-EST. PATIENT-LVL II: ICD-10-PCS | Mod: PBBFAC,,, | Performed by: INTERNAL MEDICINE

## 2019-07-19 PROCEDURE — 96372 THER/PROPH/DIAG INJ SC/IM: CPT | Mod: S$GLB,,, | Performed by: INTERNAL MEDICINE

## 2019-07-19 PROCEDURE — 3008F BODY MASS INDEX DOCD: CPT | Mod: CPTII,S$GLB,, | Performed by: INTERNAL MEDICINE

## 2019-07-19 RX ORDER — LEVOFLOXACIN 500 MG/1
500 TABLET, FILM COATED ORAL DAILY
Qty: 10 TABLET | Refills: 0 | Status: SHIPPED | OUTPATIENT
Start: 2019-07-19 | End: 2019-08-01

## 2019-07-19 RX ORDER — ALPRAZOLAM 0.5 MG/1
0.5 TABLET ORAL 2 TIMES DAILY PRN
Qty: 60 TABLET | Refills: 3 | Status: SHIPPED | OUTPATIENT
Start: 2019-07-19 | End: 2019-11-18 | Stop reason: SDUPTHER

## 2019-07-19 RX ORDER — KETOROLAC TROMETHAMINE 30 MG/ML
60 INJECTION, SOLUTION INTRAMUSCULAR; INTRAVENOUS
Status: COMPLETED | OUTPATIENT
Start: 2019-07-19 | End: 2019-07-19

## 2019-07-19 RX ORDER — METHYLPREDNISOLONE ACETATE 80 MG/ML
160 INJECTION, SUSPENSION INTRA-ARTICULAR; INTRALESIONAL; INTRAMUSCULAR; SOFT TISSUE
Status: COMPLETED | OUTPATIENT
Start: 2019-07-19 | End: 2019-07-19

## 2019-07-19 RX ORDER — CYANOCOBALAMIN 1000 UG/ML
1000 INJECTION, SOLUTION INTRAMUSCULAR; SUBCUTANEOUS
Status: COMPLETED | OUTPATIENT
Start: 2019-07-19 | End: 2019-07-19

## 2019-07-19 RX ADMIN — CYANOCOBALAMIN 1000 MCG: 1000 INJECTION, SOLUTION INTRAMUSCULAR; SUBCUTANEOUS at 02:07

## 2019-07-19 RX ADMIN — KETOROLAC TROMETHAMINE 60 MG: 30 INJECTION, SOLUTION INTRAMUSCULAR; INTRAVENOUS at 02:07

## 2019-07-19 RX ADMIN — METHYLPREDNISOLONE ACETATE 160 MG: 80 INJECTION, SUSPENSION INTRA-ARTICULAR; INTRALESIONAL; INTRAMUSCULAR; SOFT TISSUE at 02:07

## 2019-07-19 ASSESSMENT — ROUTINE ASSESSMENT OF PATIENT INDEX DATA (RAPID3)
PATIENT GLOBAL ASSESSMENT SCORE: 10
PAIN SCORE: 10
PSYCHOLOGICAL DISTRESS SCORE: 4.4
MDHAQ FUNCTION SCORE: 1.1
TOTAL RAPID3 SCORE: 7.89

## 2019-07-19 NOTE — PROGRESS NOTES
Subjective:       Patient ID: Kayla Small is a 49 y.o. female.    Chief Complaint: Multiple Sclerosis; Psoriatic Arthritis; and Rheumatoid Arthritis    Follow up: Psoiatic arthritis, MS , she had an infection and held her acthar. Pt is in a lot of pain with RA and we will proceed to  Rituxan.. Patient complains of arthralgias and myalgias for which has been present for a few years. Pain is located in multiple joints, both shoulder(s), both elbow(s), both wrist(s), both MCP(s): 1st, 2nd, 3rd, 4th and 5th, both PIP(s): 1st, 2nd, 3rd, 4th and 5th, both DIP(s): 1st and 2nd, both hip(s), both knee(s) and both MTP(s): 1st, 2nd, 3rd, 4th and 5th, is described as aching, pulsating, shooting and throbbing, and is constant, severe pain    Review of Systems   Constitutional: Positive for activity change. Negative for appetite change, chills, diaphoresis and unexpected weight change.   HENT: Negative for congestion, dental problem, ear discharge, ear pain, facial swelling, mouth sores, nosebleeds, postnasal drip, rhinorrhea, sinus pressure, sneezing, sore throat, tinnitus and voice change.    Eyes: Negative for photophobia, pain, discharge, redness and itching.   Respiratory: Negative for apnea, cough, chest tightness, shortness of breath and wheezing.    Cardiovascular: Positive for leg swelling. Negative for chest pain and palpitations.   Gastrointestinal: Negative for abdominal distention, abdominal pain, constipation, diarrhea, nausea and vomiting.   Endocrine: Negative for cold intolerance, heat intolerance, polydipsia and polyuria.   Genitourinary: Negative for decreased urine volume, difficulty urinating, flank pain, frequency, hematuria and urgency.   Musculoskeletal: Positive for arthralgias, back pain, gait problem, neck pain and neck stiffness.   Skin: Negative for pallor, rash and wound.   Allergic/Immunologic: Negative for immunocompromised state.   Neurological: Negative for dizziness, tremors, weakness and  "numbness.   Hematological: Negative for adenopathy. Does not bruise/bleed easily.   Psychiatric/Behavioral: Negative for sleep disturbance. The patient is not nervous/anxious.          Objective:     /87 (BP Location: Left arm, Patient Position: Sitting, BP Method: Medium (Automatic))   Ht 5' 4" (1.626 m)   Wt 101.6 kg (224 lb)   BMI 38.45 kg/m²      Physical Exam   Nursing note and vitals reviewed.  Constitutional: She is oriented to person, place, and time. She appears distressed.   HENT:   Head: Normocephalic and atraumatic.   Mouth/Throat: Oropharynx is clear and moist.   Eyes: EOM are normal. Pupils are equal, round, and reactive to light.   Neck: Neck supple. No thyromegaly present.   Cardiovascular: Normal rate, regular rhythm and normal heart sounds.  Exam reveals no gallop and no friction rub.    No murmur heard.  Pulmonary/Chest: Breath sounds normal. She has no wheezes. She has no rales. She exhibits no tenderness.   Abdominal: There is no tenderness. There is no rebound and no guarding.       Right Side Rheumatological Exam     Examination finds the elbow normal.    The patient is tender to palpation of the shoulder, wrist, knee, 1st PIP, 1st MCP, 2nd PIP, 2nd MCP, 3rd PIP, 3rd MCP, 4th PIP, 4th MCP, 5th PIP and 5th MCP    She has swelling of the 1st PIP, 1st MCP, 2nd PIP, 2nd MCP, 3rd PIP, 3rd MCP, 4th PIP, 4th MCP, 5th PIP and 5th MCP    Shoulder Exam   Tenderness Location: no tenderness    Range of Motion   Active abduction: abnormal   Adduction: abnormal  Sensation: normal    Knee Exam   Patellofemoral Crepitus: positive  Effusion: negative  Sensation: normal    Hip Exam   Tenderness Location: posterior  Sensation: normal    Elbow/Wrist Exam   Tenderness Location: no tenderness  Sensation: normal    Left Side Rheumatological Exam     Examination finds the elbow normal.    The patient is tender to palpation of the shoulder, wrist, knee, 1st PIP, 1st MCP, 2nd PIP, 2nd MCP, 3rd PIP, 3rd MCP, " 4th PIP, 4th MCP, 5th PIP and 5th MCP.    She has swelling of the 1st PIP, 1st MCP, 2nd PIP, 2nd MCP, 3rd PIP, 3rd MCP, 4th PIP, 4th MCP, 5th PIP and 5th MCP    Shoulder Exam   Tenderness Location: no tenderness    Range of Motion   Active abduction: abnormal   Sensation: normal    Knee Exam     Patellofemoral Crepitus: positive  Effusion: negative  Sensation: normal    Hip Exam   Tenderness Location: posterior  Sensation: normal    Elbow/Wrist Exam   Sensation: normal      Back/Neck Exam   General Inspection   Gait: normal         Lymphadenopathy:     She has no cervical adenopathy.   Neurological: She is alert and oriented to person, place, and time. Gait normal.   Skin: No rash noted. No erythema. No pallor.     Psychiatric: Mood and affect normal.   Musculoskeletal: She exhibits tenderness and deformity. She exhibits no edema.           Results for orders placed or performed during the hospital encounter of 07/11/19   POCT CBC   Result Value Ref Range    POC WBC 12.7 3.9 - 12.7 K/uL    POC GRA% 91.6 (H) 38.0 - 73.0 %    POC MID% 2.6 (L) 4.0 - 15.0 %    POC LYM% 5.8 (L) 18.0 - 48.0 %    POC Gran# 11.7 (H) 1.8 - 7.7 K/uL    POC MID# 0.3 0.3 - 1.0 K/uL    POC LYM# 0.7 (L) 1.0 - 4.8 K/uL    POC RBC 4.12 4.00 - 5.40 M/uL    POC HGB 11.3 (L) 12.0 - 16.0 g/dL    POC MCV 77.1 (L) 82.0 - 98.0 fl    POC HCT 31.8 (L) 37.0 - 48.5 %    POC MCH 27.6 27.0 - 31.0 pg    POC MCHC 35.8 32.0 - 36.0 g/dL    POC RDW% 15.4 (H) 11.5 - 14.5 %    POC  150 - 350 K/uL    POC MPV 7.6 (L) 9.2 - 12.9 fl   POCT CMP   Result Value Ref Range    POC Sodium 133 128 - 145 mmol/L    POC Potassium 3.6 3.6 - 5.1 mmol/L    POC Chloride 112 (H) 98 - 108 mmol/L    POC CO2 28 18 - 33 mmol/L    POC Glucose 103 73 - 118 mg/dL    POC BUN 14 7 - 22 mg/dL    POC Creatinine 0.8 0.6 - 1.2 mg/dL    Calcium, POC 9.4 8.0 - 10.3 mg/dL    Albumin, POC 3.1 (L) 3.3 - 5.5 g/dL    POC ALT 35 10 - 47 U/L    POC AST 62 (H) 11 - 38 U/L    Alkaline Phosphatase, POC  147 (H) 42 - 141 U/L    POC TOTAL PROTEIN 6.3 (L) 6.4 - 8.1 g/dL    POC TOTAL BILIRUBIN 0.9 0.2 - 1.6 mg/dL    POC eGFR >60 61 - 2,000 mL/min    POC Hemolysis 0     Specimen Type BLNK    POCT B-type natriuretic peptide (BNP)   Result Value Ref Range    POC B-Type Natriuretic Peptide 110.0 (H) 5.0 - 100.0 pg/mL             Assessment:     Encounter Diagnoses   Name Primary?    Multiple sclerosis Yes    Psoriatic arthritis     Rheumatoid arthritis of multiple sites with negative rheumatoid factor     Persistent migraine aura with cerebral infarction and status migrainosus, not intractable         Plan:     Multiple sclerosis  -     Sedimentation rate; Future; Expected date: 07/19/2019  -     CBC auto differential; Future; Expected date: 07/19/2019  -     Comprehensive metabolic panel; Future; Expected date: 07/19/2019  -     Hepatitis B core antibody, IgM; Future; Expected date: 07/19/2019  -     Hepatitis C antibody; Future; Expected date: 07/19/2019  -     Quantiferon Gold TB; Future; Expected date: 07/19/2019  -     Vitamin D; Future; Expected date: 07/19/2019  -     methylPREDNISolone acetate injection 160 mg  -     ketorolac injection 60 mg  -     cyanocobalamin injection 1,000 mcg  -     IgE; Future; Expected date: 08/02/2019  -     IgG; Future; Expected date: 07/19/2019  -     IgG 1, 2, 3, and 4; Future; Expected date: 07/19/2019  -     IgM; Future; Expected date: 07/19/2019  -     IgA; Future; Expected date: 07/19/2019  -     HEPATITIS B SURFACE ANTIGEN; Future; Expected date: 07/19/2019    Psoriatic arthritis  -     Sedimentation rate; Future; Expected date: 07/19/2019  -     CBC auto differential; Future; Expected date: 07/19/2019  -     Comprehensive metabolic panel; Future; Expected date: 07/19/2019  -     Hepatitis B core antibody, IgM; Future; Expected date: 07/19/2019  -     Hepatitis C antibody; Future; Expected date: 07/19/2019  -     Quantiferon Gold TB; Future; Expected date: 07/19/2019  -      Vitamin D; Future; Expected date: 07/19/2019  -     methylPREDNISolone acetate injection 160 mg  -     ketorolac injection 60 mg  -     cyanocobalamin injection 1,000 mcg  -     IgE; Future; Expected date: 08/02/2019  -     IgG; Future; Expected date: 07/19/2019  -     IgG 1, 2, 3, and 4; Future; Expected date: 07/19/2019  -     IgM; Future; Expected date: 07/19/2019  -     IgA; Future; Expected date: 07/19/2019  -     HEPATITIS B SURFACE ANTIGEN; Future; Expected date: 07/19/2019    Rheumatoid arthritis of multiple sites with negative rheumatoid factor  -     levoFLOXacin (LEVAQUIN) 500 MG tablet; Take 1 tablet (500 mg total) by mouth once daily. for 10 days  Dispense: 10 tablet; Refill: 0  -     Sedimentation rate; Future; Expected date: 07/19/2019  -     CBC auto differential; Future; Expected date: 07/19/2019  -     Comprehensive metabolic panel; Future; Expected date: 07/19/2019  -     Hepatitis B core antibody, IgM; Future; Expected date: 07/19/2019  -     Hepatitis C antibody; Future; Expected date: 07/19/2019  -     Quantiferon Gold TB; Future; Expected date: 07/19/2019  -     Vitamin D; Future; Expected date: 07/19/2019  -     methylPREDNISolone acetate injection 160 mg  -     ketorolac injection 60 mg  -     cyanocobalamin injection 1,000 mcg  -     IgE; Future; Expected date: 08/02/2019  -     IgG; Future; Expected date: 07/19/2019  -     IgG 1, 2, 3, and 4; Future; Expected date: 07/19/2019  -     IgM; Future; Expected date: 07/19/2019  -     IgA; Future; Expected date: 07/19/2019  -     HEPATITIS B SURFACE ANTIGEN; Future; Expected date: 07/19/2019    Persistent migraine aura with cerebral infarction and status migrainosus, not intractable  -     Sedimentation rate; Future; Expected date: 07/19/2019  -     CBC auto differential; Future; Expected date: 07/19/2019  -     Comprehensive metabolic panel; Future; Expected date: 07/19/2019  -     Hepatitis B core antibody, IgM; Future; Expected date:  07/19/2019  -     Hepatitis C antibody; Future; Expected date: 07/19/2019  -     Quantiferon Gold TB; Future; Expected date: 07/19/2019  -     Vitamin D; Future; Expected date: 07/19/2019  -     methylPREDNISolone acetate injection 160 mg  -     ketorolac injection 60 mg  -     cyanocobalamin injection 1,000 mcg  -     HEPATITIS B SURFACE ANTIGEN; Future; Expected date: 07/19/2019    Other orders  -     ALPRAZolam (XANAX) 0.5 MG tablet; Take 1 tablet (0.5 mg total) by mouth 2 (two) times daily as needed for Anxiety.  Dispense: 60 tablet; Refill: 3         Consider IVIG and Rituxan to treat immune defiency and RA  Treat infection start Rituxan,  DC methotrexate

## 2019-07-19 NOTE — PROGRESS NOTES
Administered 1 cc ( 1000 mcg/ml ) of b12 to the right upper outer gluteal. Informed of s/s to report verbalized understanding. No adverse reactions noted.    Lot # [id number]  Expiration nov 20    Administered 2 cc ( 80 mg/ml ) of depomedrol to the right upper outer gluteal. Informed of s/s to report verbalized understanding. No adverse reactions noted.    Lot # [id number]  Expiration 05/20    Administered 2 cc ( 30 mg/ml ) of toradol to the left upper outer gluteal. Informed of s/s to report verbalized understanding. No adverse reactions noted.    Lot # [id number]  Expiration 1 jul 2020

## 2019-07-23 ENCOUNTER — TELEPHONE (OUTPATIENT)
Dept: RHEUMATOLOGY | Facility: CLINIC | Age: 49
End: 2019-07-23

## 2019-07-23 NOTE — TELEPHONE ENCOUNTER
----- Message from Jessica Laurenharpal sent at 7/23/2019 11:01 AM CDT -----  Contact: Abigail Hayes is checking on the PA for [employer] unit to make sure you received everything faxed over on 7/11. This is the last time and will cancel the order soonCall back at [phone] or just fax back to [phone].  Thanks

## 2019-07-24 ENCOUNTER — PATIENT MESSAGE (OUTPATIENT)
Dept: RHEUMATOLOGY | Facility: CLINIC | Age: 49
End: 2019-07-24

## 2019-07-29 ENCOUNTER — PATIENT MESSAGE (OUTPATIENT)
Dept: RHEUMATOLOGY | Facility: CLINIC | Age: 49
End: 2019-07-29

## 2019-07-29 NOTE — TELEPHONE ENCOUNTER
----- Message from Tori Phelps sent at 7/29/2019  4:32 PM CDT -----  Contact: Pt  Pt called to speak to the nurse regarding infusions that are supposed to be scheduled for her and would like a call back at [phone]

## 2019-08-01 RX ORDER — CITALOPRAM 40 MG/1
40 TABLET, FILM COATED ORAL DAILY
Qty: 30 TABLET | Refills: 4 | Status: SHIPPED | OUTPATIENT
Start: 2019-08-01 | End: 2019-12-18 | Stop reason: SDUPTHER

## 2019-08-01 RX ORDER — ROPINIROLE 2 MG/1
2 TABLET, FILM COATED ORAL NIGHTLY
Qty: 30 TABLET | Refills: 4 | Status: SHIPPED | OUTPATIENT
Start: 2019-08-01 | End: 2019-12-18 | Stop reason: SDUPTHER

## 2019-08-05 ENCOUNTER — PATIENT MESSAGE (OUTPATIENT)
Dept: RHEUMATOLOGY | Facility: CLINIC | Age: 49
End: 2019-08-05

## 2019-08-21 DIAGNOSIS — E87.6 HYPOKALEMIA: ICD-10-CM

## 2019-08-21 DIAGNOSIS — M25.579 ANKLE PAIN, UNSPECIFIED CHRONICITY, UNSPECIFIED LATERALITY: ICD-10-CM

## 2019-08-21 DIAGNOSIS — L40.50 PSORIATIC ARTHRITIS: ICD-10-CM

## 2019-08-21 DIAGNOSIS — H53.9 VISION CHANGES: ICD-10-CM

## 2019-08-21 DIAGNOSIS — Z79.631 ENCOUNTER FOR METHOTREXATE MONITORING: ICD-10-CM

## 2019-08-21 DIAGNOSIS — Z51.81 ENCOUNTER FOR METHOTREXATE MONITORING: ICD-10-CM

## 2019-08-21 DIAGNOSIS — G35 MULTIPLE SCLEROSIS: ICD-10-CM

## 2019-08-23 RX ORDER — SUMATRIPTAN SUCCINATE 100 MG/1
TABLET ORAL
Qty: 18 TABLET | Refills: 0 | Status: SHIPPED | OUTPATIENT
Start: 2019-08-23 | End: 2019-11-14 | Stop reason: SDUPTHER

## 2019-09-09 ENCOUNTER — DOCUMENTATION ONLY (OUTPATIENT)
Dept: INFUSION THERAPY | Facility: HOSPITAL | Age: 49
End: 2019-09-09

## 2019-09-12 ENCOUNTER — DOCUMENTATION ONLY (OUTPATIENT)
Dept: INFUSION THERAPY | Facility: HOSPITAL | Age: 49
End: 2019-09-12

## 2019-09-12 NOTE — PROGRESS NOTES
Left message for patient to call us back to schedule patient sent message to md office to let them know unable to reach patient

## 2019-09-19 ENCOUNTER — PATIENT MESSAGE (OUTPATIENT)
Dept: RHEUMATOLOGY | Facility: CLINIC | Age: 49
End: 2019-09-19

## 2019-09-23 ENCOUNTER — TELEPHONE (OUTPATIENT)
Dept: RHEUMATOLOGY | Facility: CLINIC | Age: 49
End: 2019-09-23

## 2019-09-23 NOTE — TELEPHONE ENCOUNTER
----- Message from Ana Camilo sent at 9/23/2019  8:46 AM CDT -----  Contact: Rehana antony/ [facility] ph#[phone]   Rehana antony/ [facility] ph#[phone] checking the status of prior auth   Key code: [id number] or call plan at [phone] id#[id number]  Please call

## 2019-09-25 ENCOUNTER — PATIENT MESSAGE (OUTPATIENT)
Dept: RHEUMATOLOGY | Facility: CLINIC | Age: 49
End: 2019-09-25

## 2019-09-25 RX ORDER — TEMAZEPAM 30 MG/1
30 CAPSULE ORAL NIGHTLY
Qty: 30 CAPSULE | Refills: 3 | Status: SHIPPED | OUTPATIENT
Start: 2019-09-25 | End: 2020-04-24 | Stop reason: SDUPTHER

## 2019-09-25 NOTE — TELEPHONE ENCOUNTER
----- Message from Cyn Tapia sent at 9/25/2019 12:05 PM CDT -----  Contact: [facility]  Type:  Pharmacy Calling to Clarify an RX    Name of Caller:  Rehana  Pharmacy Name:[facility]  Prescription Name:  Acthar HP Gel  What do they need to clarify?:  PA  Best Call Back Number: [phone] code key: [id number] through cover my meds

## 2019-09-26 NOTE — TELEPHONE ENCOUNTER
Attempted to file, yogesh advises that [facility] does not file PA for pt. [facility]

## 2019-10-02 ENCOUNTER — TELEPHONE (OUTPATIENT)
Dept: INFUSION THERAPY | Facility: HOSPITAL | Age: 49
End: 2019-10-02

## 2019-10-02 NOTE — TELEPHONE ENCOUNTER
MICHAEL 09/20/19 @8:37  MICHAEL 10/02/19 @1:02    Sent message to md office unable to contact patient to schedule infusion

## 2019-10-02 NOTE — TELEPHONE ENCOUNTER
----- Message from Jane Muro sent at 9/19/2019  4:45 PM CDT -----  Caller: Patient                 Callback number: [phone]                        Reason: Patient request a call to schedule infusion apt.                  Thank you

## 2019-10-03 ENCOUNTER — TELEPHONE (OUTPATIENT)
Dept: RHEUMATOLOGY | Facility: CLINIC | Age: 49
End: 2019-10-03

## 2019-10-03 NOTE — TELEPHONE ENCOUNTER
----- Message from Yaneth Foote MA sent at 10/2/2019  1:04 PM CDT -----  Still unable to contact patient about scheduling infusion appointment

## 2019-10-15 DIAGNOSIS — Z51.81 ENCOUNTER FOR METHOTREXATE MONITORING: ICD-10-CM

## 2019-10-15 DIAGNOSIS — Z79.631 ENCOUNTER FOR METHOTREXATE MONITORING: ICD-10-CM

## 2019-10-15 DIAGNOSIS — G35 MULTIPLE SCLEROSIS: ICD-10-CM

## 2019-10-15 DIAGNOSIS — M25.579 ANKLE PAIN, UNSPECIFIED CHRONICITY, UNSPECIFIED LATERALITY: ICD-10-CM

## 2019-10-15 DIAGNOSIS — E87.6 HYPOKALEMIA: ICD-10-CM

## 2019-10-15 DIAGNOSIS — L40.50 PSORIATIC ARTHRITIS: ICD-10-CM

## 2019-10-15 DIAGNOSIS — H53.9 VISION CHANGES: ICD-10-CM

## 2019-10-15 RX ORDER — CYANOCOBALAMIN 1000 UG/ML
INJECTION, SOLUTION INTRAMUSCULAR; SUBCUTANEOUS
Qty: 10 ML | Refills: 0 | Status: SHIPPED | OUTPATIENT
Start: 2019-10-15 | End: 2020-02-05 | Stop reason: SDUPTHER

## 2019-10-18 ENCOUNTER — PATIENT MESSAGE (OUTPATIENT)
Dept: RHEUMATOLOGY | Facility: CLINIC | Age: 49
End: 2019-10-18

## 2019-11-14 DIAGNOSIS — G35 MULTIPLE SCLEROSIS: ICD-10-CM

## 2019-11-14 DIAGNOSIS — E87.6 HYPOKALEMIA: ICD-10-CM

## 2019-11-14 DIAGNOSIS — Z51.81 ENCOUNTER FOR METHOTREXATE MONITORING: ICD-10-CM

## 2019-11-14 DIAGNOSIS — L40.50 PSORIATIC ARTHRITIS: ICD-10-CM

## 2019-11-14 DIAGNOSIS — G35 MS (MULTIPLE SCLEROSIS): ICD-10-CM

## 2019-11-14 DIAGNOSIS — L40.50 PSA (PSORIATIC ARTHRITIS): ICD-10-CM

## 2019-11-14 DIAGNOSIS — M25.579 ANKLE PAIN, UNSPECIFIED CHRONICITY, UNSPECIFIED LATERALITY: ICD-10-CM

## 2019-11-14 DIAGNOSIS — Z79.631 ENCOUNTER FOR METHOTREXATE MONITORING: ICD-10-CM

## 2019-11-14 DIAGNOSIS — H53.9 VISION CHANGES: ICD-10-CM

## 2019-11-14 RX ORDER — ALPRAZOLAM 0.5 MG/1
0.5 TABLET ORAL 2 TIMES DAILY PRN
Qty: 60 TABLET | Refills: 3 | Status: CANCELLED | OUTPATIENT
Start: 2019-11-14 | End: 2019-12-14

## 2019-11-15 RX ORDER — FOLIC ACID 1 MG/1
1000 TABLET ORAL DAILY
Qty: 30 TABLET | Refills: 3 | Status: SHIPPED | OUTPATIENT
Start: 2019-11-15 | End: 2020-05-12

## 2019-11-15 RX ORDER — CELECOXIB 200 MG/1
200 CAPSULE ORAL DAILY
Qty: 30 CAPSULE | Refills: 5 | Status: SHIPPED | OUTPATIENT
Start: 2019-11-15 | End: 2020-08-06 | Stop reason: SDUPTHER

## 2019-11-15 RX ORDER — GABAPENTIN 300 MG/1
300 CAPSULE ORAL 3 TIMES DAILY
Qty: 90 CAPSULE | Refills: 3 | Status: SHIPPED | OUTPATIENT
Start: 2019-11-15 | End: 2020-05-12

## 2019-11-15 RX ORDER — AZATHIOPRINE 50 MG/1
50 TABLET ORAL DAILY
Qty: 30 TABLET | Refills: 3 | Status: SHIPPED | OUTPATIENT
Start: 2019-11-15 | End: 2020-06-12 | Stop reason: SDUPTHER

## 2019-11-18 ENCOUNTER — PATIENT MESSAGE (OUTPATIENT)
Dept: RHEUMATOLOGY | Facility: CLINIC | Age: 49
End: 2019-11-18

## 2019-11-18 RX ORDER — ALPRAZOLAM 0.5 MG/1
0.5 TABLET ORAL 2 TIMES DAILY PRN
Qty: 60 TABLET | Refills: 3 | Status: CANCELLED | OUTPATIENT
Start: 2019-11-14 | End: 2019-12-14

## 2019-11-19 RX ORDER — ALPRAZOLAM 0.5 MG/1
0.5 TABLET ORAL 2 TIMES DAILY PRN
Qty: 60 TABLET | Refills: 1 | Status: SHIPPED | OUTPATIENT
Start: 2019-11-19 | End: 2020-03-05 | Stop reason: SDUPTHER

## 2019-12-18 RX ORDER — ROPINIROLE 2 MG/1
2 TABLET, FILM COATED ORAL NIGHTLY
Qty: 30 TABLET | Refills: 4 | Status: CANCELLED | OUTPATIENT
Start: 2019-12-18

## 2019-12-18 RX ORDER — CITALOPRAM 40 MG/1
40 TABLET, FILM COATED ORAL DAILY
Qty: 30 TABLET | Refills: 4 | Status: CANCELLED | OUTPATIENT
Start: 2019-12-18

## 2019-12-19 RX ORDER — ROPINIROLE 2 MG/1
2 TABLET, FILM COATED ORAL NIGHTLY
Qty: 30 TABLET | Refills: 3 | Status: SHIPPED | OUTPATIENT
Start: 2019-12-19 | End: 2020-06-12 | Stop reason: SDUPTHER

## 2019-12-19 RX ORDER — CITALOPRAM 40 MG/1
40 TABLET, FILM COATED ORAL DAILY
Qty: 30 TABLET | Refills: 3 | Status: SHIPPED | OUTPATIENT
Start: 2019-12-19 | End: 2020-06-12 | Stop reason: SDUPTHER

## 2019-12-31 ENCOUNTER — TELEPHONE (OUTPATIENT)
Dept: PHARMACY | Facility: CLINIC | Age: 49
End: 2019-12-31

## 2019-12-31 ENCOUNTER — PATIENT MESSAGE (OUTPATIENT)
Dept: RHEUMATOLOGY | Facility: CLINIC | Age: 49
End: 2019-12-31

## 2019-12-31 NOTE — TELEPHONE ENCOUNTER
Attempted to reach pt concerning pa for Relistor 150mg.     Unable to leave voice message b/c PT does not have voicemail setup.    PA submitted to outside MD on 12/31/19 @ 12:27pm.    Thank You!   Fernanda Arriaza CPhT, B.A  Patient Care Advocate   [facility]  [email or web address]  Phone: [phone] Ext [phone]  Fax: [phone]

## 2020-01-23 ENCOUNTER — TELEPHONE (OUTPATIENT)
Dept: PHARMACY | Facility: CLINIC | Age: 50
End: 2020-01-23

## 2020-02-05 DIAGNOSIS — Z79.631 ENCOUNTER FOR METHOTREXATE MONITORING: ICD-10-CM

## 2020-02-05 DIAGNOSIS — L40.50 PSORIATIC ARTHRITIS: ICD-10-CM

## 2020-02-05 DIAGNOSIS — H53.9 VISION CHANGES: ICD-10-CM

## 2020-02-05 DIAGNOSIS — E53.8 B12 DEFICIENCY: Primary | ICD-10-CM

## 2020-02-05 DIAGNOSIS — Z51.81 ENCOUNTER FOR METHOTREXATE MONITORING: ICD-10-CM

## 2020-02-05 DIAGNOSIS — M25.579 ANKLE PAIN, UNSPECIFIED CHRONICITY, UNSPECIFIED LATERALITY: ICD-10-CM

## 2020-02-05 DIAGNOSIS — E87.6 HYPOKALEMIA: ICD-10-CM

## 2020-02-05 DIAGNOSIS — G35 MULTIPLE SCLEROSIS: ICD-10-CM

## 2020-02-05 RX ORDER — CYANOCOBALAMIN 1000 UG/ML
INJECTION, SOLUTION INTRAMUSCULAR; SUBCUTANEOUS
Qty: 10 ML | Refills: 0 | Status: SHIPPED | OUTPATIENT
Start: 2020-02-05 | End: 2020-10-09 | Stop reason: SDUPTHER

## 2020-02-05 RX ORDER — ALPRAZOLAM 0.5 MG/1
0.5 TABLET ORAL 2 TIMES DAILY PRN
Qty: 60 TABLET | Refills: 1 | Status: CANCELLED | OUTPATIENT
Start: 2020-02-05 | End: 2020-03-06

## 2020-02-06 RX ORDER — ALPRAZOLAM 0.5 MG/1
0.5 TABLET ORAL 2 TIMES DAILY PRN
Qty: 60 TABLET | Refills: 1 | Status: CANCELLED | OUTPATIENT
Start: 2020-02-05 | End: 2020-03-06

## 2020-02-11 RX ORDER — ALPRAZOLAM 0.5 MG/1
0.5 TABLET ORAL 2 TIMES DAILY PRN
Qty: 60 TABLET | Refills: 1 | Status: CANCELLED | OUTPATIENT
Start: 2020-02-05 | End: 2020-03-06

## 2020-03-05 RX ORDER — ALPRAZOLAM 0.5 MG/1
0.5 TABLET ORAL 2 TIMES DAILY PRN
Qty: 60 TABLET | Refills: 1 | Status: CANCELLED | OUTPATIENT
Start: 2020-03-05 | End: 2020-04-04

## 2020-03-06 RX ORDER — ALPRAZOLAM 0.5 MG/1
0.5 TABLET ORAL 2 TIMES DAILY PRN
Qty: 60 TABLET | Refills: 1 | Status: CANCELLED | OUTPATIENT
Start: 2020-03-05 | End: 2020-04-04

## 2020-03-15 RX ORDER — ALPRAZOLAM 0.5 MG/1
0.5 TABLET ORAL 2 TIMES DAILY PRN
Qty: 60 TABLET | Refills: 1 | Status: SHIPPED | OUTPATIENT
Start: 2020-03-15 | End: 2020-08-06 | Stop reason: SDUPTHER

## 2020-04-08 ENCOUNTER — PATIENT MESSAGE (OUTPATIENT)
Dept: PHARMACY | Facility: CLINIC | Age: 50
End: 2020-04-08

## 2020-04-08 ENCOUNTER — TELEPHONE (OUTPATIENT)
Dept: PHARMACY | Facility: CLINIC | Age: 50
End: 2020-04-08

## 2020-04-08 NOTE — TELEPHONE ENCOUNTER
Attempted to reach patient concerning Relistor PA from outside MD, Dr. Salguero.      PA has not been approved, have called providers office, have faxed providers office.    [facility] for provider's office today, 4/8/2020, @ 11:54am concerning this issue.    Thank You!   Fernanda Arriaaz CPhT, B.A  Patient Care Advocate   [facility]  [email or web address]  Phone: [phone] Ext [phone]  Fax: [phone]

## 2020-04-14 RX ORDER — TEMAZEPAM 30 MG/1
30 CAPSULE ORAL NIGHTLY
Qty: 30 CAPSULE | Refills: 3 | Status: CANCELLED | OUTPATIENT
Start: 2020-04-14

## 2020-04-27 RX ORDER — TEMAZEPAM 30 MG/1
CAPSULE ORAL
Qty: 30 CAPSULE | Refills: 3 | OUTPATIENT
Start: 2020-04-27

## 2020-04-27 RX ORDER — TEMAZEPAM 30 MG/1
30 CAPSULE ORAL NIGHTLY
Qty: 30 CAPSULE | Refills: 3 | Status: SHIPPED | OUTPATIENT
Start: 2020-04-27 | End: 2020-09-03 | Stop reason: SDUPTHER

## 2020-05-12 DIAGNOSIS — L40.50 PSORIATIC ARTHRITIS: ICD-10-CM

## 2020-05-12 DIAGNOSIS — G35 MULTIPLE SCLEROSIS: ICD-10-CM

## 2020-05-12 DIAGNOSIS — Z51.81 ENCOUNTER FOR METHOTREXATE MONITORING: ICD-10-CM

## 2020-05-12 DIAGNOSIS — G35 MS (MULTIPLE SCLEROSIS): ICD-10-CM

## 2020-05-12 DIAGNOSIS — H53.9 VISION CHANGES: ICD-10-CM

## 2020-05-12 DIAGNOSIS — M25.579 ANKLE PAIN, UNSPECIFIED CHRONICITY, UNSPECIFIED LATERALITY: ICD-10-CM

## 2020-05-12 DIAGNOSIS — L40.50 PSA (PSORIATIC ARTHRITIS): ICD-10-CM

## 2020-05-12 DIAGNOSIS — Z79.631 ENCOUNTER FOR METHOTREXATE MONITORING: ICD-10-CM

## 2020-05-12 DIAGNOSIS — E87.6 HYPOKALEMIA: ICD-10-CM

## 2020-05-12 RX ORDER — ALPRAZOLAM 0.5 MG/1
0.5 TABLET ORAL 2 TIMES DAILY PRN
Qty: 60 TABLET | Refills: 1 | Status: CANCELLED | OUTPATIENT
Start: 2020-05-12 | End: 2020-06-11

## 2020-05-17 RX ORDER — GABAPENTIN 300 MG/1
300 CAPSULE ORAL 3 TIMES DAILY
Qty: 90 CAPSULE | Refills: 3 | Status: SHIPPED | OUTPATIENT
Start: 2020-05-17 | End: 2021-02-15 | Stop reason: CLARIF

## 2020-05-17 RX ORDER — FOLIC ACID 1 MG/1
1000 TABLET ORAL DAILY
Qty: 30 TABLET | Refills: 3 | Status: SHIPPED | OUTPATIENT
Start: 2020-05-17 | End: 2021-02-15 | Stop reason: CLARIF

## 2020-05-26 ENCOUNTER — PATIENT MESSAGE (OUTPATIENT)
Dept: RHEUMATOLOGY | Facility: CLINIC | Age: 50
End: 2020-05-26

## 2020-05-26 RX ORDER — ALPRAZOLAM 0.5 MG/1
0.5 TABLET ORAL 2 TIMES DAILY PRN
Qty: 60 TABLET | Refills: 1 | Status: CANCELLED | OUTPATIENT
Start: 2020-05-26 | End: 2020-06-25

## 2020-05-27 RX ORDER — ALPRAZOLAM 0.5 MG/1
0.5 TABLET ORAL 2 TIMES DAILY PRN
Qty: 60 TABLET | Refills: 1 | Status: CANCELLED | OUTPATIENT
Start: 2020-05-26 | End: 2020-06-25

## 2020-06-12 DIAGNOSIS — G25.81 RESTLESS LEGS: ICD-10-CM

## 2020-06-12 DIAGNOSIS — L40.50 PSORIATIC ARTHRITIS: Primary | ICD-10-CM

## 2020-06-12 NOTE — TELEPHONE ENCOUNTER
No labs in nearly 1 year. No f/u scheduled. Last seen in July. I didn't want to deny all without your approval, but I set them for 30 days with no refills if you want her to come in to see me then I can extend refills

## 2020-06-15 RX ORDER — AZATHIOPRINE 50 MG/1
50 TABLET ORAL DAILY
Qty: 30 TABLET | Refills: 0 | Status: SHIPPED | OUTPATIENT
Start: 2020-06-15 | End: 2020-09-03

## 2020-06-15 RX ORDER — CITALOPRAM 40 MG/1
40 TABLET, FILM COATED ORAL DAILY
Qty: 30 TABLET | Refills: 0 | Status: SHIPPED | OUTPATIENT
Start: 2020-06-15 | End: 2020-08-06 | Stop reason: SDUPTHER

## 2020-06-15 RX ORDER — ROPINIROLE 2 MG/1
2 TABLET, FILM COATED ORAL NIGHTLY
Qty: 30 TABLET | Refills: 0 | Status: SHIPPED | OUTPATIENT
Start: 2020-06-15 | End: 2020-08-04 | Stop reason: SDUPTHER

## 2020-06-15 RX ORDER — ESTERIFIED ESTROGENS 1.25 MG/1
1 TABLET, FILM COATED ORAL DAILY
Qty: 30 EACH | Refills: 0 | Status: SHIPPED | OUTPATIENT
Start: 2020-06-15 | End: 2020-08-28 | Stop reason: SDUPTHER

## 2020-07-21 DIAGNOSIS — E87.6 HYPOKALEMIA: ICD-10-CM

## 2020-07-21 DIAGNOSIS — L40.50 PSORIATIC ARTHRITIS: ICD-10-CM

## 2020-07-21 DIAGNOSIS — G35 MULTIPLE SCLEROSIS: ICD-10-CM

## 2020-07-21 DIAGNOSIS — H53.9 VISION CHANGES: ICD-10-CM

## 2020-07-21 DIAGNOSIS — M25.579 ANKLE PAIN, UNSPECIFIED CHRONICITY, UNSPECIFIED LATERALITY: ICD-10-CM

## 2020-07-21 DIAGNOSIS — G25.81 RESTLESS LEGS: ICD-10-CM

## 2020-07-21 DIAGNOSIS — Z79.631 ENCOUNTER FOR METHOTREXATE MONITORING: ICD-10-CM

## 2020-07-21 DIAGNOSIS — Z51.81 ENCOUNTER FOR METHOTREXATE MONITORING: ICD-10-CM

## 2020-07-21 RX ORDER — AZATHIOPRINE 50 MG/1
50 TABLET ORAL DAILY
Qty: 30 TABLET | Refills: 0 | Status: CANCELLED | OUTPATIENT
Start: 2020-07-21

## 2020-07-26 RX ORDER — AZATHIOPRINE 50 MG/1
50 TABLET ORAL DAILY
Qty: 30 TABLET | Refills: 0 | OUTPATIENT
Start: 2020-07-26

## 2020-07-27 RX ORDER — ROPINIROLE 2 MG/1
2 TABLET, FILM COATED ORAL NIGHTLY
Qty: 30 TABLET | Refills: 0 | OUTPATIENT
Start: 2020-07-27

## 2020-07-27 RX ORDER — CELECOXIB 200 MG/1
200 CAPSULE ORAL DAILY
Qty: 30 CAPSULE | Refills: 5 | OUTPATIENT
Start: 2020-07-27

## 2020-07-27 RX ORDER — CITALOPRAM 40 MG/1
40 TABLET, FILM COATED ORAL DAILY
Qty: 30 TABLET | Refills: 0 | OUTPATIENT
Start: 2020-07-27

## 2020-07-27 RX ORDER — ESTERIFIED ESTROGENS 1.25 MG/1
1 TABLET, FILM COATED ORAL DAILY
Qty: 30 EACH | Refills: 0 | OUTPATIENT
Start: 2020-07-27 | End: 2021-07-27

## 2020-07-27 RX ORDER — SUMATRIPTAN SUCCINATE 100 MG/1
TABLET ORAL
Qty: 18 TABLET | Refills: 0 | OUTPATIENT
Start: 2020-07-27

## 2020-07-27 RX ORDER — BUPROPION HYDROCHLORIDE 150 MG/1
150 TABLET, EXTENDED RELEASE ORAL 2 TIMES DAILY
Qty: 60 TABLET | Refills: 0 | OUTPATIENT
Start: 2020-07-27

## 2020-08-03 ENCOUNTER — PATIENT MESSAGE (OUTPATIENT)
Dept: RHEUMATOLOGY | Facility: CLINIC | Age: 50
End: 2020-08-03

## 2020-08-03 DIAGNOSIS — G25.81 RESTLESS LEGS: ICD-10-CM

## 2020-08-03 RX ORDER — ROPINIROLE 2 MG/1
2 TABLET, FILM COATED ORAL NIGHTLY
Qty: 30 TABLET | Refills: 0 | Status: CANCELLED | OUTPATIENT
Start: 2020-08-03

## 2020-08-04 DIAGNOSIS — E87.6 HYPOKALEMIA: ICD-10-CM

## 2020-08-04 DIAGNOSIS — M25.579 ANKLE PAIN, UNSPECIFIED CHRONICITY, UNSPECIFIED LATERALITY: ICD-10-CM

## 2020-08-04 DIAGNOSIS — L40.50 PSORIATIC ARTHRITIS: ICD-10-CM

## 2020-08-04 DIAGNOSIS — G35 MULTIPLE SCLEROSIS: ICD-10-CM

## 2020-08-04 DIAGNOSIS — Z79.631 ENCOUNTER FOR METHOTREXATE MONITORING: ICD-10-CM

## 2020-08-04 DIAGNOSIS — Z51.81 ENCOUNTER FOR METHOTREXATE MONITORING: ICD-10-CM

## 2020-08-04 DIAGNOSIS — H53.9 VISION CHANGES: ICD-10-CM

## 2020-08-04 RX ORDER — SUMATRIPTAN SUCCINATE 100 MG/1
TABLET ORAL
Qty: 18 TABLET | Refills: 0 | Status: CANCELLED | OUTPATIENT
Start: 2020-08-04

## 2020-08-06 ENCOUNTER — OFFICE VISIT (OUTPATIENT)
Dept: RHEUMATOLOGY | Facility: CLINIC | Age: 50
End: 2020-08-06
Payer: COMMERCIAL

## 2020-08-06 DIAGNOSIS — G35 MULTIPLE SCLEROSIS: ICD-10-CM

## 2020-08-06 DIAGNOSIS — M06.09 RHEUMATOID ARTHRITIS OF MULTIPLE SITES WITH NEGATIVE RHEUMATOID FACTOR: ICD-10-CM

## 2020-08-06 DIAGNOSIS — F41.9 ANXIETY: ICD-10-CM

## 2020-08-06 DIAGNOSIS — B34.9 VIRAL INFECTION: ICD-10-CM

## 2020-08-06 DIAGNOSIS — Z20.822 CLOSE EXPOSURE TO COVID-19 VIRUS: ICD-10-CM

## 2020-08-06 DIAGNOSIS — G25.81 RESTLESS LEGS: ICD-10-CM

## 2020-08-06 DIAGNOSIS — L40.50 PSORIATIC ARTHRITIS: Primary | ICD-10-CM

## 2020-08-06 PROCEDURE — 99214 PR OFFICE/OUTPT VISIT, EST, LEVL IV, 30-39 MIN: ICD-10-PCS | Mod: 95,,, | Performed by: PHYSICIAN ASSISTANT

## 2020-08-06 PROCEDURE — 99214 OFFICE O/P EST MOD 30 MIN: CPT | Mod: 95,,, | Performed by: PHYSICIAN ASSISTANT

## 2020-08-06 RX ORDER — ROPINIROLE 3 MG/1
3 TABLET, FILM COATED ORAL NIGHTLY
Qty: 30 TABLET | Refills: 3 | Status: SHIPPED | OUTPATIENT
Start: 2020-08-06 | End: 2020-12-15 | Stop reason: SDUPTHER

## 2020-08-06 RX ORDER — DEXAMETHASONE 1 MG/1
1 TABLET ORAL DAILY
Qty: 10 TABLET | Refills: 0 | Status: SHIPPED | OUTPATIENT
Start: 2020-08-06 | End: 2020-08-17

## 2020-08-06 RX ORDER — CELECOXIB 200 MG/1
200 CAPSULE ORAL DAILY
Qty: 30 CAPSULE | Refills: 5 | Status: SHIPPED | OUTPATIENT
Start: 2020-08-06 | End: 2021-05-12 | Stop reason: SDUPTHER

## 2020-08-06 RX ORDER — AZITHROMYCIN 250 MG/1
250 TABLET, FILM COATED ORAL DAILY
Qty: 10 TABLET | Refills: 0 | Status: SHIPPED | OUTPATIENT
Start: 2020-08-06 | End: 2020-08-17

## 2020-08-06 RX ORDER — CITALOPRAM 40 MG/1
40 TABLET, FILM COATED ORAL DAILY
Qty: 30 TABLET | Refills: 3 | Status: SHIPPED | OUTPATIENT
Start: 2020-08-06 | End: 2020-12-15 | Stop reason: SDUPTHER

## 2020-08-06 NOTE — PROGRESS NOTES
The patient location is:  home  The chief complaint leading to consultation is: RA, PSA, MS    Visit type: audiovisual    Face to Face time with patient: 35 minutes  45 minutes of total time spent on the encounter, which includes face to face time and non-face to face time preparing to see the patient (eg, review of tests), Obtaining and/or reviewing separately obtained history, Documenting clinical information in the electronic or other health record, Independently interpreting results (not separately reported) and communicating results to the patient/family/caregiver, or Care coordination (not separately reported).   Each patient to whom he or she provides medical services by telemedicine is:  (1) informed of the relationship between the physician and patient and the respective role of any other health care provider with respect to management of the patient; and (2) notified that he or she may decline to receive medical services by telemedicine and may withdraw from such care at any time.    Notes:  Subjective:       Patient ID: Kayla Small is a 50 y.o. female.    Chief Complaint: Disease Management    Mrs. Small is a 50 year old female who presents to telemedicine virtual visit for follow up on psoriatic arthritis, rheumatoid arthritis. She is a new patient to me. She has been lost to follow up for the last year. At her last visit, RTX infusion was ordered, but due to frequent infection and pneumonia she never started treatment. She has been maintained on imuran, celebrex, and acthar (until she ran out). Chronic pain is managed by Dr. Cunha on nucytna and percocet. She is also on gabapentin for pain control. Anxiety is uncontrolled and she is on celexa and wellbutrin--not followed by psychiatry--ran out of xanax several months ago and not doing well with panic attacks. She has chronic insomnia on restoril and restless legs on requip. Currently, she complains of increasing arthritis pain involving her hands,  knees, hips, and ankles. Pain is constant and aching. She recently had [facility] for back pain and was diagnosed with L trochanteric bursitis. Doing poorly overall due to pain and she ready to start more aggressive tx. With regards to her MS, she has not seen Dr. Kohli in the last 6 mo and plans to est with another provider.     In addition, she has been suffering with productive cough, sob x 3-4 weeks with known covid exposure from her daughter who tested positive 2 weeks ago. PCP started her on levaquin yesterday and recommended testing, which she refuses. Temp is 99.0. She does not have pulse oxygen. Last hospitalization for pneumonitis 2019.  No pulmonary f/u or immunology eval.     Current tx:  1. Imuran    Review of Systems   Constitutional: Positive for activity change and fatigue. Negative for appetite change, chills and fever.   Eyes: Negative for visual disturbance.   Respiratory: Positive for cough, chest tightness and shortness of breath.    Cardiovascular: Negative for chest pain, palpitations and leg swelling.   Gastrointestinal: Negative for abdominal pain, constipation, diarrhea, nausea and vomiting.   Musculoskeletal: Positive for arthralgias, back pain, gait problem, joint swelling and myalgias.   Allergic/Immunologic: Positive for immunocompromised state.   Neurological: Negative for dizziness, weakness, light-headedness and headaches.   Psychiatric/Behavioral: Positive for sleep disturbance. The patient is nervous/anxious.          Objective:   There were no vitals filed for this visit.    Past Medical History:   Diagnosis Date    Anemia     Arthritis     Cancer     Thyroid    Depression     Gastritis     GERD (gastroesophageal reflux disease)     Migraine headache     Multiple sclerosis     Psoriatic arthritis     Thyroid cancer      Past Surgical History:   Procedure Laterality Date    breast augmentation      R and L breast  removed     SECTION, CLASSIC      x 2     COLONOSCOPY  2010    HYSTERECTOMY  1997    TVH with BSO, anemia postoperatively    THYROID SURGERY      tumor removed behind L ear            Physical Exam   Constitutional: She is oriented to person, place, and time.   Neurological: She is alert and oriented to person, place, and time.   Psychiatric: Her mood appears anxious.       No recent labs  Assessment:       1. Psoriatic arthritis    2. Rheumatoid arthritis of multiple sites with negative rheumatoid factor    3. Close Exposure to Covid-19 Virus    4. Viral infection    5. Multiple sclerosis    6. Restless legs    7. Anxiety            Plan:       Psoriatic arthritis  -     CELEBREX 200 mg capsule; Take 1 capsule (200 mg total) by mouth once daily.  Dispense: 30 capsule; Refill: 5  -     C-Reactive Protein; Future; Expected date: 08/06/2020  -     Sedimentation rate; Future; Expected date: 08/06/2020  -     IgG; Future; Expected date: 08/06/2020  -     IgG 1, 2, 3, and 4; Future; Expected date: 08/06/2020  -     IgM; Future; Expected date: 08/06/2020  -     IgE; Future; Expected date: 08/20/2020  -     IgA; Future; Expected date: 08/06/2020  -     Quantiferon Gold TB; Future; Expected date: 08/06/2020  -     Hepatitis B Core Antibody, Total; Future; Expected date: 08/06/2020  -     Hepatitis B Surface Antigen; Future; Expected date: 08/06/2020  -     HEPATITIS B SURFACE ANTIBODY; Future; Expected date: 08/06/2020    Rheumatoid arthritis of multiple sites with negative rheumatoid factor  -     C-Reactive Protein; Future; Expected date: 08/06/2020  -     Sedimentation rate; Future; Expected date: 08/06/2020  -     IgG; Future; Expected date: 08/06/2020  -     IgG 1, 2, 3, and 4; Future; Expected date: 08/06/2020  -     IgM; Future; Expected date: 08/06/2020  -     IgE; Future; Expected date: 08/20/2020  -     IgA; Future; Expected date: 08/06/2020  -     Quantiferon Gold TB; Future; Expected date: 08/06/2020  -     Hepatitis B Core Antibody, Total; Future;  Expected date: 08/06/2020  -     Hepatitis B Surface Antigen; Future; Expected date: 08/06/2020  -     HEPATITIS B SURFACE ANTIBODY; Future; Expected date: 08/06/2020    Close Exposure to Covid-19 Virus  -     dexAMETHasone (DECADRON) 1 MG Tab; Take 1 tablet (1 mg total) by mouth once daily. for 10 days  Dispense: 10 tablet; Refill: 0  -     azithromycin (Z-ELSA) 250 MG tablet; Take 1 tablet (250 mg total) by mouth once daily. for 10 days  Dispense: 10 tablet; Refill: 0    Viral infection  -     dexAMETHasone (DECADRON) 1 MG Tab; Take 1 tablet (1 mg total) by mouth once daily. for 10 days  Dispense: 10 tablet; Refill: 0  -     azithromycin (Z-ELSA) 250 MG tablet; Take 1 tablet (250 mg total) by mouth once daily. for 10 days  Dispense: 10 tablet; Refill: 0    Multiple sclerosis  -     CELEBREX 200 mg capsule; Take 1 capsule (200 mg total) by mouth once daily.  Dispense: 30 capsule; Refill: 5  -     Ambulatory referral/consult to Neurology; Future; Expected date: 08/13/2020    Restless legs  -     rOPINIRole (REQUIP) 3 MG tablet; Take 1 tablet (3 mg total) by mouth every evening.  Dispense: 30 tablet; Refill: 3    Anxiety  -     citalopram (CELEXA) 40 MG tablet; Take 1 tablet (40 mg total) by mouth once daily.  Dispense: 30 tablet; Refill: 3         Assessment:  50 year old female with  Psoriatic arthritis, seronegative RA on imuran  --MS  --uncontrolled anxiety  --restless leg syndrome  --hx of thyroid cancer  --chronic pain syndrome followed by pain management  --hx of thyroid cancer followed by Dr. Goldsmith    Plan:  1. D/c levaquin. Start azithromycin 250 mg daily x 10 days, decadron 1 mg daily x 10 days. Pt refused covid testing. Obtain pulse oxy and monitor closely. Go to ER if respiratory sx worsen. Hold imuran.   2. Check labs once recovered from acute infection and will reassess RTX infusions  3. Increase requip 3 mg  4. Referral to Neurology for MS    Follow up:  1 month for VV, 4 mo Dr. Bianchi

## 2020-08-06 NOTE — Clinical Note
Call to her to schedule video visit with me in 1 month and labs 1 week prior   4 mo dr. Bianchi  Neurology referral--send a staff message to notify of referral for MS

## 2020-08-29 RX ORDER — ESTERIFIED ESTROGENS 1.25 MG/1
1 TABLET, FILM COATED ORAL DAILY
Qty: 30 EACH | Refills: 0 | Status: SHIPPED | OUTPATIENT
Start: 2020-08-29 | End: 2020-11-05 | Stop reason: SDUPTHER

## 2020-09-03 ENCOUNTER — TELEPHONE (OUTPATIENT)
Dept: RHEUMATOLOGY | Facility: CLINIC | Age: 50
End: 2020-09-03

## 2020-09-03 ENCOUNTER — OFFICE VISIT (OUTPATIENT)
Dept: RHEUMATOLOGY | Facility: CLINIC | Age: 50
End: 2020-09-03
Payer: COMMERCIAL

## 2020-09-03 ENCOUNTER — HOSPITAL ENCOUNTER (OUTPATIENT)
Dept: RADIOLOGY | Facility: HOSPITAL | Age: 50
Discharge: HOME | End: 2020-09-03
Attending: PHYSICIAN ASSISTANT
Payer: COMMERCIAL

## 2020-09-03 VITALS
DIASTOLIC BLOOD PRESSURE: 86 MMHG | HEIGHT: 64 IN | BODY MASS INDEX: 45.75 KG/M2 | WEIGHT: 268 LBS | HEART RATE: 101 BPM | SYSTOLIC BLOOD PRESSURE: 119 MMHG

## 2020-09-03 DIAGNOSIS — G35 MS (MULTIPLE SCLEROSIS): ICD-10-CM

## 2020-09-03 DIAGNOSIS — F41.9 ANXIETY: ICD-10-CM

## 2020-09-03 DIAGNOSIS — M06.09 RHEUMATOID ARTHRITIS OF MULTIPLE SITES WITH NEGATIVE RHEUMATOID FACTOR: ICD-10-CM

## 2020-09-03 DIAGNOSIS — R05.3 CHRONIC COUGH: ICD-10-CM

## 2020-09-03 DIAGNOSIS — L40.50 PSA (PSORIATIC ARTHRITIS): Primary | ICD-10-CM

## 2020-09-03 DIAGNOSIS — G47.00 INSOMNIA, UNSPECIFIED TYPE: Primary | ICD-10-CM

## 2020-09-03 PROCEDURE — 99999 PR PBB SHADOW E&M-EST. PATIENT-LVL V: CPT | Mod: PBBFAC,,, | Performed by: PHYSICIAN ASSISTANT

## 2020-09-03 PROCEDURE — 3008F PR BODY MASS INDEX (BMI) DOCUMENTED: ICD-10-PCS | Mod: CPTII,S$GLB,, | Performed by: PHYSICIAN ASSISTANT

## 2020-09-03 PROCEDURE — 71046 XR CHEST PA AND LATERAL: ICD-10-PCS | Mod: 26,,, | Performed by: RADIOLOGY

## 2020-09-03 PROCEDURE — 99214 OFFICE O/P EST MOD 30 MIN: CPT | Mod: 25,S$GLB,, | Performed by: PHYSICIAN ASSISTANT

## 2020-09-03 PROCEDURE — 96372 PR INJECTION,THERAP/PROPH/DIAG2ST, IM OR SUBCUT: ICD-10-PCS | Mod: S$GLB,,, | Performed by: PHYSICIAN ASSISTANT

## 2020-09-03 PROCEDURE — 99999 PR PBB SHADOW E&M-EST. PATIENT-LVL V: ICD-10-PCS | Mod: PBBFAC,,, | Performed by: PHYSICIAN ASSISTANT

## 2020-09-03 PROCEDURE — 96372 THER/PROPH/DIAG INJ SC/IM: CPT | Mod: S$GLB,,, | Performed by: PHYSICIAN ASSISTANT

## 2020-09-03 PROCEDURE — 71046 X-RAY EXAM CHEST 2 VIEWS: CPT | Mod: 26,,, | Performed by: RADIOLOGY

## 2020-09-03 PROCEDURE — 3008F BODY MASS INDEX DOCD: CPT | Mod: CPTII,S$GLB,, | Performed by: PHYSICIAN ASSISTANT

## 2020-09-03 PROCEDURE — 71046 X-RAY EXAM CHEST 2 VIEWS: CPT | Mod: TC,FY,PO

## 2020-09-03 PROCEDURE — 99214 PR OFFICE/OUTPT VISIT, EST, LEVL IV, 30-39 MIN: ICD-10-PCS | Mod: 25,S$GLB,, | Performed by: PHYSICIAN ASSISTANT

## 2020-09-03 RX ORDER — ALPRAZOLAM 0.5 MG/1
0.5 TABLET ORAL 2 TIMES DAILY PRN
Qty: 60 TABLET | Refills: 0 | Status: CANCELLED | OUTPATIENT
Start: 2020-09-03 | End: 2020-10-03

## 2020-09-03 RX ORDER — CEFTRIAXONE 1 G/1
1 INJECTION, POWDER, FOR SOLUTION INTRAMUSCULAR; INTRAVENOUS
Status: COMPLETED | OUTPATIENT
Start: 2020-09-03 | End: 2020-09-03

## 2020-09-03 RX ORDER — FOLIC ACID 1 MG/1
1000 TABLET ORAL DAILY
Qty: 30 TABLET | Refills: 3 | Status: SHIPPED | OUTPATIENT
Start: 2020-09-03 | End: 2021-04-05 | Stop reason: SDUPTHER

## 2020-09-03 RX ORDER — METHYLPREDNISOLONE ACETATE 80 MG/ML
160 INJECTION, SUSPENSION INTRA-ARTICULAR; INTRALESIONAL; INTRAMUSCULAR; SOFT TISSUE
Status: DISCONTINUED | OUTPATIENT
Start: 2020-09-03 | End: 2020-09-03

## 2020-09-03 RX ORDER — DEXAMETHASONE SODIUM PHOSPHATE 4 MG/ML
8 INJECTION, SOLUTION INTRA-ARTICULAR; INTRALESIONAL; INTRAMUSCULAR; INTRAVENOUS; SOFT TISSUE
Status: COMPLETED | OUTPATIENT
Start: 2020-09-03 | End: 2020-09-03

## 2020-09-03 RX ORDER — PROMETHAZINE HYDROCHLORIDE AND DEXTROMETHORPHAN HYDROBROMIDE 6.25; 15 MG/5ML; MG/5ML
5 SYRUP ORAL EVERY 8 HOURS PRN
Qty: 118 ML | Refills: 0 | Status: SHIPPED | OUTPATIENT
Start: 2020-09-03 | End: 2020-09-13

## 2020-09-03 RX ORDER — GABAPENTIN 300 MG/1
300 CAPSULE ORAL 3 TIMES DAILY
Qty: 90 CAPSULE | Refills: 3 | Status: SHIPPED | OUTPATIENT
Start: 2020-09-03 | End: 2021-04-05 | Stop reason: SDUPTHER

## 2020-09-03 RX ORDER — CYANOCOBALAMIN 1000 UG/ML
1000 INJECTION, SOLUTION INTRAMUSCULAR; SUBCUTANEOUS
Status: COMPLETED | OUTPATIENT
Start: 2020-09-03 | End: 2020-09-03

## 2020-09-03 RX ORDER — BUPROPION HYDROCHLORIDE 150 MG/1
150 TABLET, EXTENDED RELEASE ORAL 2 TIMES DAILY
Qty: 60 TABLET | Refills: 3 | Status: SHIPPED | OUTPATIENT
Start: 2020-09-03 | End: 2021-05-12 | Stop reason: SDUPTHER

## 2020-09-03 RX ORDER — LEVOFLOXACIN 500 MG/1
500 TABLET, FILM COATED ORAL DAILY
Qty: 10 TABLET | Refills: 0 | Status: SHIPPED | OUTPATIENT
Start: 2020-09-03 | End: 2021-01-05

## 2020-09-03 RX ADMIN — CEFTRIAXONE 1 G: 1 INJECTION, POWDER, FOR SOLUTION INTRAMUSCULAR; INTRAVENOUS at 11:09

## 2020-09-03 RX ADMIN — CYANOCOBALAMIN 1000 MCG: 1000 INJECTION, SOLUTION INTRAMUSCULAR; SUBCUTANEOUS at 11:09

## 2020-09-03 RX ADMIN — DEXAMETHASONE SODIUM PHOSPHATE 8 MG: 4 INJECTION, SOLUTION INTRA-ARTICULAR; INTRALESIONAL; INTRAMUSCULAR; INTRAVENOUS; SOFT TISSUE at 11:09

## 2020-09-03 ASSESSMENT — ROUTINE ASSESSMENT OF PATIENT INDEX DATA (RAPID3)
PAIN SCORE: 10
TOTAL RAPID3 SCORE: 8
PSYCHOLOGICAL DISTRESS SCORE: 2.2
MDHAQ FUNCTION SCORE: 1.8
FATIGUE SCORE: 2.2
PATIENT GLOBAL ASSESSMENT SCORE: 8

## 2020-09-03 NOTE — PROGRESS NOTES
Subjective:       Patient ID: Kayla Small is a 50 y.o. female.    Chief Complaint: Disease Management    Mrs. Small is a 50 year old female who presents to clinic for follow up on psoriatic arthritis, rheumatoid arthritis. She completed azithromycin and decadron prescribed last month and refused covid testing. She states fever has resolved, but she continues to have shortness of breath and productive cough. She reports chronic cough x 1 year since she was hospitalized for pneumonitis in 7/2019. No pulmonary f/u or repeat imaging since that time. She has been holding imuran due to persistent infection.     Neurology referral placed at last visit, but no appt scheduled. Off plegrdiy x 6 months. No recent MS flares.     She is doing poorly from an arthritis standpoint. She is taking celebrex daily. She complains of generalized pain throughout her body. She reports pain in her hands, wrists, elbows, shoulders, hips, knees, and ankles. Pain is constant/aching. She has severe L hip pain, diagnosed with bursitis, and plans to f/u with Pain Management in 2 weeks. Recently had JUAN for lumbar spine pain.     Last followed up with Endocrinology approx 6 months ago.    Anxiety is uncontrolled on celexa, wellbutrin, xanax, and sleep is poor on restoril. No followed by Psychiatry currently.    Current tx:  1. none       Review of Systems   Constitutional: Positive for activity change and fatigue. Negative for appetite change, chills and fever.   Eyes: Negative for visual disturbance.   Respiratory: Positive for cough and shortness of breath.    Cardiovascular: Positive for leg swelling. Negative for chest pain and palpitations.   Gastrointestinal: Negative for abdominal pain, constipation, diarrhea, nausea and vomiting.   Musculoskeletal: Positive for arthralgias and joint swelling.   Neurological: Negative for dizziness, weakness, light-headedness and headaches.   Psychiatric/Behavioral: Positive for sleep disturbance.  Negative for dysphoric mood. The patient is nervous/anxious.          Objective:     Vitals:    20 0954   BP: 119/86   Pulse: 101       Past Medical History:   Diagnosis Date    Anemia     Arthritis     Cancer     Thyroid    Depression     Gastritis     GERD (gastroesophageal reflux disease)     Migraine headache     Multiple sclerosis     Psoriatic arthritis     Thyroid cancer      Past Surgical History:   Procedure Laterality Date    breast augmentation      R and L breast  removed     SECTION, CLASSIC      x 2    COLONOSCOPY      HYSTERECTOMY      TVH with BSO, anemia postoperatively    THYROID SURGERY      tumor removed behind L ear            Physical Exam   Constitutional: She is well-developed, well-nourished, and in no distress.   Eyes: Right conjunctiva is not injected. Left conjunctiva is not injected. Right eye exhibits normal extraocular motion. Left eye exhibits normal extraocular motion.   Neck: No JVD present. No thyromegaly present.   Cardiovascular: Normal rate and regular rhythm.  Exam reveals no decreased pulses.    Pulmonary/Chest: She has no wheezes. She has no rhonchi. She has no rales.       Right Side Rheumatological Exam     Examination finds the 1st MCP, 2nd MCP, 3rd MCP, 4th MCP and 5th MCP normal.    The patient is tender to palpation of the shoulder, elbow, wrist, knee, 1st PIP, 2nd PIP, 3rd PIP, 4th PIP and 5th PIP    She has swelling of the wrist, 1st PIP, 2nd PIP, 3rd PIP, 4th PIP and 5th PIP    Left Side Rheumatological Exam     Examination finds the 1st MCP, 2nd MCP, 3rd MCP, 4th MCP and 5th MCP normal.    The patient is tender to palpation of the shoulder, elbow, wrist, knee, 1st PIP, 2nd PIP, 3rd PIP, 4th PIP and 5th PIP.    She has swelling of the wrist, 1st PIP, 2nd PIP, 3rd PIP, 4th PIP and 5th PIP    Hip Exam   Tenderness Location: lateral      Lymphadenopathy:     She has no cervical adenopathy.   Neurological: Gait normal.   Skin: No  purpura noted. Rash is not nodular and not urticarial. No cyanosis.     Psychiatric: Mood and affect normal.   Musculoskeletal: Tenderness and edema present.         No recent labs  Assessment:       1. PSA (psoriatic arthritis)    2. Rheumatoid arthritis of multiple sites with negative rheumatoid factor    3. Chronic cough    4. MS (multiple sclerosis)    5. Anxiety            Plan:       PSA (psoriatic arthritis)  -     gabapentin (NEURONTIN) 300 MG capsule; Take 1 capsule (300 mg total) by mouth 3 (three) times daily.  Dispense: 90 capsule; Refill: 3  -     folic acid (FOLVITE) 1 MG tablet; Take 1 tablet (1,000 mcg total) by mouth once daily.  Dispense: 30 tablet; Refill: 3  -     Discontinue: methylPREDNISolone acetate injection 160 mg  -     cyanocobalamin injection 1,000 mcg    Rheumatoid arthritis of multiple sites with negative rheumatoid factor  -     levoFLOXacin (LEVAQUIN) 500 MG tablet; Take 1 tablet (500 mg total) by mouth once daily.  Dispense: 10 tablet; Refill: 0    Chronic cough  -     Discontinue: methylPREDNISolone acetate injection 160 mg  -     X-Ray Chest PA And Lateral; Future; Expected date: 09/03/2020  -     Ambulatory referral/consult to Pulmonology; Future; Expected date: 09/10/2020  -     cefTRIAXone injection 1 g  -     dexamethasone injection 8 mg  -     levoFLOXacin (LEVAQUIN) 500 MG tablet; Take 1 tablet (500 mg total) by mouth once daily.  Dispense: 10 tablet; Refill: 0  -     promethazine-dextromethorphan (PROMETHAZINE-DM) 6.25-15 mg/5 mL Syrp; Take 5 mLs by mouth every 8 (eight) hours as needed.  Dispense: 118 mL; Refill: 0    MS (multiple sclerosis)  -     gabapentin (NEURONTIN) 300 MG capsule; Take 1 capsule (300 mg total) by mouth 3 (three) times daily.  Dispense: 90 capsule; Refill: 3    Anxiety  -     buPROPion (WELLBUTRIN SR) 150 MG TBSR 12 hr tablet; Take 1 tablet (150 mg total) by mouth 2 (two) times daily.  Dispense: 60 tablet; Refill: 3  -     Ambulatory referral/consult  to Psychiatry; Future; Expected date: 09/10/2020         Assessment:  50 year old female with  Psoriatic arthritis, seronegative RA on imuran  --MS  --uncontrolled anxiety on xanax  --chronic insomnia on restoril  --restless leg syndrome  --chronic pain syndrome followed by pain management  --hx of thyroid cancer followed by Dr. Goldsmith    Plan: Case discussed with Dr. Bianchi. Assessment and Plan done in collaboration    1. Labs and CXR today  2. Pulmonary referral  3. Decadron 8 mg, b12, rocephin IM given today in clinic. Start levaquin 500 mg daily x 10 days.   4. Cont. Gabapentin 300 mg tid prn. Pain management followed by outside MD on nucynta and percocet.   5. Cont. celexa 40 mg, cont wellbutrin  mg bid. Cont. Xanax and restoril per Dr. Bianchi .  I have checked [place] prescription monitoring program site and no unusual or abnormal behavior has occurred pt understand the risk and benefits of taking opioid medications and has decided to continue the medication. Recommend psychiatry follow up given uncontrolled anxiety. Pt advised to contact insurance to find a provider in network.  6. Neurology f/u needed for MS. Referral placed last month.    Follow up:  3-4 Dr. Bianchi or sooner if needed

## 2020-09-03 NOTE — TELEPHONE ENCOUNTER
Xanax due on 9/6 and restoril post dated until the end of the month since she recently filled, but has no refills left.

## 2020-09-09 RX ORDER — TEMAZEPAM 30 MG/1
30 CAPSULE ORAL NIGHTLY
Qty: 30 CAPSULE | Refills: 3 | Status: SHIPPED | OUTPATIENT
Start: 2020-09-28 | End: 2021-01-05

## 2020-09-17 DIAGNOSIS — F41.9 ANXIETY: ICD-10-CM

## 2020-09-17 RX ORDER — ALPRAZOLAM 0.5 MG/1
0.5 TABLET ORAL 2 TIMES DAILY PRN
Qty: 60 TABLET | Refills: 0 | OUTPATIENT
Start: 2020-09-17 | End: 2020-10-17

## 2020-09-18 ENCOUNTER — TELEPHONE (OUTPATIENT)
Dept: RHEUMATOLOGY | Facility: CLINIC | Age: 50
End: 2020-09-18

## 2020-09-18 ENCOUNTER — OFFICE VISIT (OUTPATIENT)
Dept: CARDIOLOGY | Facility: CLINIC | Age: 50
End: 2020-09-18
Payer: COMMERCIAL

## 2020-09-18 VITALS
HEART RATE: 98 BPM | SYSTOLIC BLOOD PRESSURE: 129 MMHG | HEIGHT: 63 IN | DIASTOLIC BLOOD PRESSURE: 84 MMHG | WEIGHT: 264.56 LBS | BODY MASS INDEX: 46.88 KG/M2

## 2020-09-18 DIAGNOSIS — I50.1 PULMONARY EDEMA CARDIAC CAUSE: ICD-10-CM

## 2020-09-18 DIAGNOSIS — D50.9 MICROCYTIC ANEMIA: Primary | ICD-10-CM

## 2020-09-18 DIAGNOSIS — E87.70 HYPERVOLEMIA, UNSPECIFIED HYPERVOLEMIA TYPE: ICD-10-CM

## 2020-09-18 DIAGNOSIS — E66.9 OBESITY, UNSPECIFIED CLASSIFICATION, UNSPECIFIED OBESITY TYPE, UNSPECIFIED WHETHER SERIOUS COMORBIDITY PRESENT: ICD-10-CM

## 2020-09-18 DIAGNOSIS — R93.89 ABNORMAL CHEST X-RAY: ICD-10-CM

## 2020-09-18 PROCEDURE — 99204 PR OFFICE/OUTPT VISIT, NEW, LEVL IV, 45-59 MIN: ICD-10-PCS | Mod: S$GLB,,, | Performed by: INTERNAL MEDICINE

## 2020-09-18 PROCEDURE — 99999 PR PBB SHADOW E&M-EST. PATIENT-LVL III: ICD-10-PCS | Mod: PBBFAC,,, | Performed by: INTERNAL MEDICINE

## 2020-09-18 PROCEDURE — 3008F BODY MASS INDEX DOCD: CPT | Mod: CPTII,S$GLB,, | Performed by: INTERNAL MEDICINE

## 2020-09-18 PROCEDURE — 3008F PR BODY MASS INDEX (BMI) DOCUMENTED: ICD-10-PCS | Mod: CPTII,S$GLB,, | Performed by: INTERNAL MEDICINE

## 2020-09-18 PROCEDURE — 99204 OFFICE O/P NEW MOD 45 MIN: CPT | Mod: S$GLB,,, | Performed by: INTERNAL MEDICINE

## 2020-09-18 PROCEDURE — 99999 PR PBB SHADOW E&M-EST. PATIENT-LVL III: CPT | Mod: PBBFAC,,, | Performed by: INTERNAL MEDICINE

## 2020-09-18 NOTE — TELEPHONE ENCOUNTER
Spoke to patient's  explained that I could not give her Xanax and Restoril consider both benzodiazepine but I would give her 7 day supply until she could get to a psychiatrist

## 2020-09-18 NOTE — LETTER
September 18, 2020      Bisi Felton PA-C  [address] [facility] [address] [place] [address]           [facility]  [address] [facility] [address] [place] [address]  Phone: [phone]          Patient: Kayla Small   MR Number: [id number]   YOB: 1970   Date of Visit: 9/18/2020       Dear Bisi Felton:    Thank you for referring Kayla Small to me for evaluation. Attached you will find relevant portions of my assessment and plan of care.    If you have questions, please do not hesitate to call me. I look forward to following Kayla Small along with you.    Sincerely,    Wyatt Sargent Jr., MD    Enclosure  CC:  No Recipients    If you would like to receive this communication electronically, please contact [email or web address] or [phone] to request more information on [facility] access.    For providers and/or their staff who would like to refer a patient to [facility], please contact us through our one-stop-shop provider referral line, [facility], at [phone].    If you feel you have received this communication in error or would no longer like to receive these types of communications, please e-mail [email or web address]

## 2020-09-18 NOTE — TELEPHONE ENCOUNTER
We will have to choose between xanax and restoril I sent in the restoril if she needs xanax we will have to cancel the other because she is getting a flag for 2 benzo and  Narcotics please explain

## 2020-09-18 NOTE — PROGRESS NOTES
Subjective:    Patient ID:  Kayla Small is a 50 y.o. female who presents for evaluation of Consult (Ref by Bisi Felton PA-C), Shortness of Breath, Abnormal Chest X-ray, and Edema (Bilateral legs, hands and abdomen )      HPI 49 yo WF with multiple medical issues including MS, arthritis, obesity and chronic anemia having issues with fluid retention. She is on prednisone and NSAIDS. No CP but has SOB and wakes up at night with SOB.     · Normal left ventricular systolic function. The estimated ejection fraction is 55%  · Normal LV diastolic function.  · Normal right ventricular systolic function.    Review of Systems   Constitution: Negative for decreased appetite, fever, malaise/fatigue, weight gain and weight loss.   HENT: Negative for hearing loss and nosebleeds.    Eyes: Negative for visual disturbance.   Cardiovascular: Positive for dyspnea on exertion and leg swelling. Negative for chest pain, claudication, cyanosis, irregular heartbeat, near-syncope, orthopnea, palpitations, paroxysmal nocturnal dyspnea and syncope.   Respiratory: Positive for shortness of breath. Negative for cough, hemoptysis, sleep disturbances due to breathing, snoring and wheezing.    Endocrine: Negative for cold intolerance, heat intolerance, polydipsia and polyuria.   Hematologic/Lymphatic: Negative for adenopathy and bleeding problem. Does not bruise/bleed easily.   Skin: Negative for color change, itching, poor wound healing, rash and suspicious lesions.   Musculoskeletal: Positive for arthritis, back pain, joint pain and muscle cramps. Negative for falls, joint swelling, muscle weakness and myalgias.   Gastrointestinal: Negative for bloating, abdominal pain, change in bowel habit, constipation, flatus, heartburn, hematemesis, hematochezia, hemorrhoids, jaundice, melena, nausea and vomiting.   Genitourinary: Negative for bladder incontinence, decreased libido, frequency, hematuria, hesitancy and urgency.   Neurological: Negative for  "brief paralysis, difficulty with concentration, excessive daytime sleepiness, dizziness, focal weakness, headaches, light-headedness, loss of balance, numbness, vertigo and weakness.   Psychiatric/Behavioral: Negative for altered mental status, depression and memory loss. The patient does not have insomnia and is not nervous/anxious.    Allergic/Immunologic: Negative for environmental allergies, hives and persistent infections.        Objective:    Physical Exam   Constitutional: She is oriented to person, place, and time. She appears well-developed and well-nourished.   /84 (BP Location: Right arm, Patient Position: Sitting, BP Method: Large (Automatic))   Pulse 98   Ht 5' 3" (1.6 m)   Wt 120 kg (264 lb 8.8 oz)   BMI 46.86 kg/m²      HENT:   Head: Normocephalic and atraumatic.   Right Ear: External ear normal.   Left Ear: External ear normal.   Nose: Nose normal.   Mouth/Throat: Oropharynx is clear and moist.   Eyes: Pupils are equal, round, and reactive to light. Conjunctivae, EOM and lids are normal. Right eye exhibits no discharge. Left eye exhibits no discharge. Right conjunctiva has no hemorrhage. No scleral icterus.   Neck: Normal range of motion. Neck supple. No JVD present. No tracheal deviation present. No thyromegaly present.   Cardiovascular: Normal rate, regular rhythm, normal heart sounds and intact distal pulses. Exam reveals no gallop and no friction rub.   No murmur heard.  Pulmonary/Chest: Effort normal and breath sounds normal. No respiratory distress. She has no wheezes. She has no rales. She exhibits no tenderness. Breasts are symmetrical.   Abdominal: Soft. Bowel sounds are normal. She exhibits no distension and no mass. There is no hepatosplenomegaly or hepatomegaly. There is no abdominal tenderness. There is no rebound and no guarding.   Musculoskeletal: Normal range of motion.         General: Edema present. No tenderness.      Comments: Trace   Lymphadenopathy:     She has no " cervical adenopathy.   Neurological: She is alert and oriented to person, place, and time. She displays normal reflexes. No cranial nerve deficit. Coordination normal.   Skin: Skin is warm and dry. No rash noted. No erythema. No pallor.   Psychiatric: She has a normal mood and affect. Her behavior is normal. Judgment and thought content normal.   Nursing note and vitals reviewed.        Assessment:       1. Microcytic anemia    2. Hypervolemia, unspecified hypervolemia type    3. Abnormal chest x-ray    4. Pulmonary edema cardiac cause    5. Obesity, unspecified classification, unspecified obesity type, unspecified whether serious comorbidity present         Plan:     She has multiple reasons for retaining fluid including chronic anemia, current medications, obesity and possible sleep apnea    Doubt this is CHF related to poor LV function    Low salt diet and weight loss important      Orders Placed This Encounter   Procedures    NT-Pro Natriuretic Peptide    Echo Color Flow Doppler? Yes     Follow up if symptoms worsen or fail to improve, for Will call with results.

## 2020-10-09 DIAGNOSIS — E53.8 B12 DEFICIENCY: ICD-10-CM

## 2020-10-09 RX ORDER — ESTERIFIED ESTROGENS 1.25 MG/1
1 TABLET, FILM COATED ORAL DAILY
Qty: 30 EACH | Refills: 0 | Status: CANCELLED | OUTPATIENT
Start: 2020-10-09 | End: 2021-10-09

## 2020-10-09 RX ORDER — CYANOCOBALAMIN 1000 UG/ML
1000 INJECTION, SOLUTION INTRAMUSCULAR; SUBCUTANEOUS
Qty: 10 ML | Refills: 0 | Status: SHIPPED | OUTPATIENT
Start: 2020-10-09 | End: 2021-05-12 | Stop reason: SDUPTHER

## 2020-11-05 RX ORDER — ESTERIFIED ESTROGENS 1.25 MG/1
1 TABLET, FILM COATED ORAL DAILY
Qty: 30 EACH | Refills: 0 | Status: CANCELLED | OUTPATIENT
Start: 2020-11-05 | End: 2021-11-05

## 2020-11-08 RX ORDER — ESTERIFIED ESTROGENS 1.25 MG/1
1 TABLET, FILM COATED ORAL DAILY
Qty: 30 EACH | Refills: 0 | Status: SHIPPED | OUTPATIENT
Start: 2020-11-08 | End: 2020-12-15 | Stop reason: SDUPTHER

## 2020-11-17 DIAGNOSIS — H53.9 VISION CHANGES: ICD-10-CM

## 2020-11-17 DIAGNOSIS — E87.70 HYPERVOLEMIA, UNSPECIFIED HYPERVOLEMIA TYPE: ICD-10-CM

## 2020-11-17 DIAGNOSIS — F41.9 ANXIETY: ICD-10-CM

## 2020-11-17 DIAGNOSIS — Z79.631 ENCOUNTER FOR METHOTREXATE MONITORING: ICD-10-CM

## 2020-11-17 DIAGNOSIS — L40.50 PSA (PSORIATIC ARTHRITIS): ICD-10-CM

## 2020-11-17 DIAGNOSIS — M25.579 ANKLE PAIN, UNSPECIFIED CHRONICITY, UNSPECIFIED LATERALITY: ICD-10-CM

## 2020-11-17 DIAGNOSIS — G43.601 PERSISTENT MIGRAINE AURA WITH CEREBRAL INFARCTION AND STATUS MIGRAINOSUS, NOT INTRACTABLE: ICD-10-CM

## 2020-11-17 DIAGNOSIS — G35 MS (MULTIPLE SCLEROSIS): ICD-10-CM

## 2020-11-17 DIAGNOSIS — G35 MULTIPLE SCLEROSIS: ICD-10-CM

## 2020-11-17 DIAGNOSIS — L40.50 PSORIATIC ARTHRITIS: ICD-10-CM

## 2020-11-17 DIAGNOSIS — I63.9 PERSISTENT MIGRAINE AURA WITH CEREBRAL INFARCTION AND STATUS MIGRAINOSUS, NOT INTRACTABLE: ICD-10-CM

## 2020-11-17 DIAGNOSIS — E87.6 HYPOKALEMIA: ICD-10-CM

## 2020-11-17 DIAGNOSIS — Z51.81 ENCOUNTER FOR METHOTREXATE MONITORING: ICD-10-CM

## 2020-11-17 RX ORDER — DONEPEZIL HYDROCHLORIDE 5 MG/1
5 TABLET, FILM COATED ORAL NIGHTLY
Qty: 90 TABLET | Refills: 3 | Status: CANCELLED | OUTPATIENT
Start: 2020-11-17

## 2020-11-17 RX ORDER — FUROSEMIDE 20 MG/1
20 TABLET ORAL DAILY
Qty: 20 TABLET | Refills: 0 | Status: CANCELLED | OUTPATIENT
Start: 2020-11-17 | End: 2021-11-17

## 2020-11-17 RX ORDER — FOLIC ACID 1 MG/1
1000 TABLET ORAL DAILY
Qty: 30 TABLET | Refills: 3 | Status: CANCELLED | OUTPATIENT
Start: 2020-11-17

## 2020-11-17 RX ORDER — FUROSEMIDE 20 MG/1
20 TABLET ORAL DAILY
Qty: 20 TABLET | Refills: 0 | OUTPATIENT
Start: 2020-11-17 | End: 2021-11-17

## 2020-11-17 RX ORDER — GABAPENTIN 300 MG/1
300 CAPSULE ORAL 3 TIMES DAILY
Qty: 90 CAPSULE | Refills: 3 | Status: CANCELLED | OUTPATIENT
Start: 2020-11-17

## 2020-11-18 RX ORDER — TOPIRAMATE 50 MG/1
50 CAPSULE, EXTENDED RELEASE ORAL DAILY
Qty: 30 CAPSULE | Refills: 3 | Status: SHIPPED | OUTPATIENT
Start: 2020-11-18 | End: 2021-05-12 | Stop reason: SDUPTHER

## 2020-11-18 RX ORDER — DONEPEZIL HYDROCHLORIDE 5 MG/1
5 TABLET, FILM COATED ORAL NIGHTLY
Qty: 90 TABLET | Refills: 3 | Status: SHIPPED | OUTPATIENT
Start: 2020-11-18 | End: 2021-02-15

## 2020-11-20 ENCOUNTER — PATIENT MESSAGE (OUTPATIENT)
Dept: RHEUMATOLOGY | Facility: CLINIC | Age: 50
End: 2020-11-20

## 2020-11-22 RX ORDER — ALPRAZOLAM 1 MG/1
1 TABLET ORAL 4 TIMES DAILY PRN
Qty: 28 TABLET | Refills: 0 | OUTPATIENT
Start: 2020-11-22 | End: 2020-11-29

## 2020-12-15 DIAGNOSIS — G25.81 RESTLESS LEGS: ICD-10-CM

## 2020-12-15 DIAGNOSIS — F41.9 ANXIETY: ICD-10-CM

## 2020-12-15 RX ORDER — ROPINIROLE 3 MG/1
3 TABLET, FILM COATED ORAL NIGHTLY
Qty: 30 TABLET | Refills: 3 | Status: SHIPPED | OUTPATIENT
Start: 2020-12-15 | End: 2021-05-12 | Stop reason: SDUPTHER

## 2020-12-15 RX ORDER — CITALOPRAM 40 MG/1
40 TABLET, FILM COATED ORAL DAILY
Qty: 30 TABLET | Refills: 3 | Status: SHIPPED | OUTPATIENT
Start: 2020-12-15 | End: 2021-02-15

## 2020-12-19 RX ORDER — ESTERIFIED ESTROGENS 1.25 MG/1
1 TABLET, FILM COATED ORAL DAILY
Qty: 30 EACH | Refills: 0 | Status: SHIPPED | OUTPATIENT
Start: 2020-12-19 | End: 2021-01-22 | Stop reason: SDUPTHER

## 2021-01-05 ENCOUNTER — OFFICE VISIT (OUTPATIENT)
Dept: RHEUMATOLOGY | Facility: CLINIC | Age: 51
End: 2021-01-05
Payer: COMMERCIAL

## 2021-01-05 ENCOUNTER — PATIENT MESSAGE (OUTPATIENT)
Dept: RHEUMATOLOGY | Facility: CLINIC | Age: 51
End: 2021-01-05

## 2021-01-05 DIAGNOSIS — M06.09 RHEUMATOID ARTHRITIS OF MULTIPLE SITES WITH NEGATIVE RHEUMATOID FACTOR: ICD-10-CM

## 2021-01-05 DIAGNOSIS — H46.9 OPTIC NEURITIS DUE TO MULTIPLE SCLEROSIS: ICD-10-CM

## 2021-01-05 DIAGNOSIS — G25.81 RESTLESS LEGS: ICD-10-CM

## 2021-01-05 DIAGNOSIS — D50.8 OTHER IRON DEFICIENCY ANEMIA: ICD-10-CM

## 2021-01-05 DIAGNOSIS — G35 OPTIC NEURITIS DUE TO MULTIPLE SCLEROSIS: ICD-10-CM

## 2021-01-05 DIAGNOSIS — G35 MS (MULTIPLE SCLEROSIS): ICD-10-CM

## 2021-01-05 DIAGNOSIS — F41.9 ANXIETY: ICD-10-CM

## 2021-01-05 DIAGNOSIS — L40.50 PSA (PSORIATIC ARTHRITIS): ICD-10-CM

## 2021-01-05 DIAGNOSIS — L40.50 PSA (PSORIATIC ARTHRITIS): Primary | ICD-10-CM

## 2021-01-05 PROBLEM — D50.9 IRON DEFICIENCY ANEMIA: Status: ACTIVE | Noted: 2021-01-05

## 2021-01-05 PROCEDURE — 99215 OFFICE O/P EST HI 40 MIN: CPT | Mod: 95,,, | Performed by: INTERNAL MEDICINE

## 2021-01-05 PROCEDURE — 99215 PR OFFICE/OUTPT VISIT, EST, LEVL V, 40-54 MIN: ICD-10-PCS | Mod: 95,,, | Performed by: INTERNAL MEDICINE

## 2021-01-05 RX ORDER — METHYLPREDNISOLONE SOD SUCC 125 MG
125 VIAL (EA) INJECTION ONCE AS NEEDED
Status: CANCELLED | OUTPATIENT
Start: 2021-01-05

## 2021-01-05 RX ORDER — ALPRAZOLAM 1 MG/1
1 TABLET ORAL 2 TIMES DAILY PRN
Qty: 60 TABLET | Refills: 3 | Status: SHIPPED | OUTPATIENT
Start: 2021-01-05 | End: 2021-07-07

## 2021-01-05 RX ORDER — EPINEPHRINE 0.3 MG/.3ML
0.3 INJECTION SUBCUTANEOUS ONCE AS NEEDED
Status: CANCELLED | OUTPATIENT
Start: 2021-01-05

## 2021-01-05 RX ORDER — HEPARIN 100 UNIT/ML
500 SYRINGE INTRAVENOUS
Status: CANCELLED | OUTPATIENT
Start: 2021-01-05

## 2021-01-05 RX ORDER — SODIUM CHLORIDE 0.9 % (FLUSH) 0.9 %
10 SYRINGE (ML) INJECTION
Status: CANCELLED | OUTPATIENT
Start: 2021-01-05

## 2021-01-05 RX ORDER — DIPHENHYDRAMINE HYDROCHLORIDE 50 MG/ML
50 INJECTION INTRAMUSCULAR; INTRAVENOUS ONCE AS NEEDED
Status: CANCELLED | OUTPATIENT
Start: 2021-01-05

## 2021-01-05 RX ORDER — ALPRAZOLAM 1 MG/1
1 TABLET ORAL 4 TIMES DAILY PRN
Qty: 28 TABLET | Refills: 0 | Status: CANCELLED | OUTPATIENT
Start: 2021-01-05 | End: 2021-01-12

## 2021-01-12 RX ORDER — ALPRAZOLAM 1 MG/1
1 TABLET ORAL 2 TIMES DAILY PRN
Qty: 60 TABLET | Refills: 3 | OUTPATIENT
Start: 2021-01-12 | End: 2021-02-11

## 2021-01-27 RX ORDER — ESTERIFIED ESTROGENS 1.25 MG/1
1 TABLET, FILM COATED ORAL DAILY
Qty: 30 EACH | Refills: 0 | Status: SHIPPED | OUTPATIENT
Start: 2021-01-27 | End: 2021-02-26 | Stop reason: SDUPTHER

## 2021-02-26 DIAGNOSIS — F41.9 ANXIETY: ICD-10-CM

## 2021-02-26 RX ORDER — CITALOPRAM 40 MG/1
40 TABLET, FILM COATED ORAL DAILY
Qty: 30 TABLET | Refills: 3 | Status: SHIPPED | OUTPATIENT
Start: 2021-02-26 | End: 2021-08-05 | Stop reason: SDUPTHER

## 2021-02-28 RX ORDER — ESTERIFIED ESTROGENS 1.25 MG/1
1 TABLET, FILM COATED ORAL DAILY
Qty: 30 EACH | Refills: 0 | Status: SHIPPED | OUTPATIENT
Start: 2021-02-28 | End: 2021-04-05 | Stop reason: SDUPTHER

## 2021-03-02 RX ORDER — LEVOTHYROXINE SODIUM 150 UG/1
150 TABLET ORAL
Qty: 28 TABLET | Refills: 0 | Status: CANCELLED | OUTPATIENT
Start: 2021-03-02

## 2021-03-05 RX ORDER — LEVOTHYROXINE SODIUM 150 UG/1
150 TABLET ORAL
Qty: 28 TABLET | Refills: 0 | Status: SHIPPED | OUTPATIENT
Start: 2021-03-05 | End: 2021-09-20 | Stop reason: SDUPTHER

## 2021-04-05 DIAGNOSIS — G35 MS (MULTIPLE SCLEROSIS): ICD-10-CM

## 2021-04-05 DIAGNOSIS — L40.50 PSA (PSORIATIC ARTHRITIS): ICD-10-CM

## 2021-04-05 RX ORDER — FOLIC ACID 1 MG/1
1000 TABLET ORAL DAILY
Qty: 30 TABLET | Refills: 3 | Status: CANCELLED | OUTPATIENT
Start: 2021-04-05

## 2021-04-06 DIAGNOSIS — L40.50 PSA (PSORIATIC ARTHRITIS): ICD-10-CM

## 2021-04-06 RX ORDER — FOLIC ACID 1 MG/1
1000 TABLET ORAL DAILY
Qty: 30 TABLET | Refills: 3 | Status: SHIPPED | OUTPATIENT
Start: 2021-04-06 | End: 2022-03-17 | Stop reason: SDUPTHER

## 2021-04-06 RX ORDER — GABAPENTIN 300 MG/1
300 CAPSULE ORAL 3 TIMES DAILY
Qty: 90 CAPSULE | Refills: 3 | Status: SHIPPED | OUTPATIENT
Start: 2021-04-06 | End: 2021-12-30 | Stop reason: SDUPTHER

## 2021-04-08 RX ORDER — ESTERIFIED ESTROGENS 1.25 MG/1
1 TABLET, FILM COATED ORAL DAILY
Qty: 30 EACH | Refills: 0 | Status: SHIPPED | OUTPATIENT
Start: 2021-04-08 | End: 2021-09-20 | Stop reason: SDUPTHER

## 2021-05-12 DIAGNOSIS — F41.9 ANXIETY: ICD-10-CM

## 2021-05-12 DIAGNOSIS — E87.70 HYPERVOLEMIA, UNSPECIFIED HYPERVOLEMIA TYPE: ICD-10-CM

## 2021-05-12 DIAGNOSIS — G35 MULTIPLE SCLEROSIS: ICD-10-CM

## 2021-05-12 DIAGNOSIS — E53.8 B12 DEFICIENCY: ICD-10-CM

## 2021-05-12 DIAGNOSIS — G25.81 RESTLESS LEGS: ICD-10-CM

## 2021-05-12 DIAGNOSIS — L40.50 PSORIATIC ARTHRITIS: ICD-10-CM

## 2021-05-12 DIAGNOSIS — G43.601 PERSISTENT MIGRAINE AURA WITH CEREBRAL INFARCTION AND STATUS MIGRAINOSUS, NOT INTRACTABLE: ICD-10-CM

## 2021-05-12 DIAGNOSIS — I63.9 PERSISTENT MIGRAINE AURA WITH CEREBRAL INFARCTION AND STATUS MIGRAINOSUS, NOT INTRACTABLE: ICD-10-CM

## 2021-05-13 ENCOUNTER — PATIENT MESSAGE (OUTPATIENT)
Dept: RHEUMATOLOGY | Facility: CLINIC | Age: 51
End: 2021-05-13

## 2021-05-14 RX ORDER — CELECOXIB 200 MG/1
200 CAPSULE ORAL DAILY
Qty: 30 CAPSULE | Refills: 5 | Status: SHIPPED | OUTPATIENT
Start: 2021-05-14 | End: 2021-09-20 | Stop reason: SDUPTHER

## 2021-05-14 RX ORDER — CYANOCOBALAMIN 1000 UG/ML
1000 INJECTION, SOLUTION INTRAMUSCULAR; SUBCUTANEOUS
Qty: 10 ML | Refills: 0 | Status: SHIPPED | OUTPATIENT
Start: 2021-05-14 | End: 2022-09-28 | Stop reason: SDUPTHER

## 2021-05-14 RX ORDER — ROPINIROLE 3 MG/1
3 TABLET, FILM COATED ORAL NIGHTLY
Qty: 30 TABLET | Refills: 3 | Status: SHIPPED | OUTPATIENT
Start: 2021-05-14 | End: 2021-09-20 | Stop reason: SDUPTHER

## 2021-05-14 RX ORDER — FUROSEMIDE 20 MG/1
20 TABLET ORAL DAILY
Qty: 20 TABLET | Refills: 0 | Status: SHIPPED | OUTPATIENT
Start: 2021-05-14 | End: 2021-06-10 | Stop reason: SDUPTHER

## 2021-05-14 RX ORDER — BUPROPION HYDROCHLORIDE 150 MG/1
150 TABLET, EXTENDED RELEASE ORAL 2 TIMES DAILY
Qty: 60 TABLET | Refills: 3 | Status: SHIPPED | OUTPATIENT
Start: 2021-05-14 | End: 2021-12-30 | Stop reason: SDUPTHER

## 2021-05-14 RX ORDER — TOPIRAMATE 50 MG/1
50 CAPSULE, EXTENDED RELEASE ORAL DAILY
Qty: 30 CAPSULE | Refills: 3 | Status: SHIPPED | OUTPATIENT
Start: 2021-05-14 | End: 2024-01-25 | Stop reason: CLARIF

## 2021-06-10 DIAGNOSIS — E87.70 HYPERVOLEMIA, UNSPECIFIED HYPERVOLEMIA TYPE: ICD-10-CM

## 2021-06-10 DIAGNOSIS — R60.9 EDEMA, UNSPECIFIED TYPE: Primary | ICD-10-CM

## 2021-06-10 RX ORDER — FUROSEMIDE 20 MG/1
20 TABLET ORAL DAILY PRN
Qty: 20 TABLET | Refills: 0 | Status: SHIPPED | OUTPATIENT
Start: 2021-06-10 | End: 2021-11-24 | Stop reason: SDUPTHER

## 2021-06-10 RX ORDER — ESTERIFIED ESTROGENS 1.25 MG/1
1 TABLET, FILM COATED ORAL DAILY
Qty: 30 EACH | Refills: 0 | OUTPATIENT
Start: 2021-06-10 | End: 2022-06-10

## 2021-07-02 ENCOUNTER — PATIENT MESSAGE (OUTPATIENT)
Dept: RHEUMATOLOGY | Facility: CLINIC | Age: 51
End: 2021-07-02

## 2021-07-06 DIAGNOSIS — F41.9 ANXIETY: ICD-10-CM

## 2021-07-06 DIAGNOSIS — G25.81 RESTLESS LEGS: ICD-10-CM

## 2021-07-06 DIAGNOSIS — G35 OPTIC NEURITIS DUE TO MULTIPLE SCLEROSIS: ICD-10-CM

## 2021-07-06 DIAGNOSIS — G35 MS (MULTIPLE SCLEROSIS): ICD-10-CM

## 2021-07-06 DIAGNOSIS — M06.09 RHEUMATOID ARTHRITIS OF MULTIPLE SITES WITH NEGATIVE RHEUMATOID FACTOR: ICD-10-CM

## 2021-07-06 DIAGNOSIS — L40.50 PSA (PSORIATIC ARTHRITIS): ICD-10-CM

## 2021-07-06 DIAGNOSIS — D50.8 OTHER IRON DEFICIENCY ANEMIA: ICD-10-CM

## 2021-07-06 DIAGNOSIS — H46.9 OPTIC NEURITIS DUE TO MULTIPLE SCLEROSIS: ICD-10-CM

## 2021-07-07 RX ORDER — ALPRAZOLAM 1 MG/1
1 TABLET ORAL 2 TIMES DAILY
Qty: 60 TABLET | Refills: 0 | Status: SHIPPED | OUTPATIENT
Start: 2021-07-07 | End: 2021-09-19

## 2021-07-08 ENCOUNTER — PATIENT MESSAGE (OUTPATIENT)
Dept: RHEUMATOLOGY | Facility: CLINIC | Age: 51
End: 2021-07-08

## 2021-08-05 DIAGNOSIS — F41.9 ANXIETY: ICD-10-CM

## 2021-08-05 DIAGNOSIS — R51.9 NONINTRACTABLE HEADACHE, UNSPECIFIED CHRONICITY PATTERN, UNSPECIFIED HEADACHE TYPE: ICD-10-CM

## 2021-08-05 RX ORDER — ESTERIFIED ESTROGENS 1.25 MG/1
1 TABLET, FILM COATED ORAL DAILY
Qty: 30 EACH | Refills: 0 | OUTPATIENT
Start: 2021-08-05 | End: 2022-08-05

## 2021-08-05 RX ORDER — SUMATRIPTAN SUCCINATE 100 MG/1
TABLET ORAL
Qty: 18 TABLET | Refills: 1 | Status: SHIPPED | OUTPATIENT
Start: 2021-08-05 | End: 2021-09-20 | Stop reason: SDUPTHER

## 2021-08-05 RX ORDER — LEVOTHYROXINE SODIUM 150 UG/1
150 TABLET ORAL
Qty: 28 TABLET | Refills: 0 | OUTPATIENT
Start: 2021-08-05

## 2021-08-05 RX ORDER — CITALOPRAM 40 MG/1
40 TABLET, FILM COATED ORAL DAILY
Qty: 30 TABLET | Refills: 3 | Status: SHIPPED | OUTPATIENT
Start: 2021-08-05 | End: 2021-09-20 | Stop reason: SDUPTHER

## 2021-11-24 DIAGNOSIS — R60.9 EDEMA, UNSPECIFIED TYPE: ICD-10-CM

## 2021-11-24 RX ORDER — FUROSEMIDE 20 MG/1
20 TABLET ORAL DAILY PRN
Qty: 20 TABLET | Refills: 0 | Status: SHIPPED | OUTPATIENT
Start: 2021-11-24 | End: 2022-08-03 | Stop reason: SDUPTHER

## 2021-12-21 ENCOUNTER — PATIENT MESSAGE (OUTPATIENT)
Dept: NEUROLOGY | Facility: CLINIC | Age: 51
End: 2021-12-21
Payer: COMMERCIAL

## 2021-12-30 DIAGNOSIS — F41.9 ANXIETY: ICD-10-CM

## 2021-12-30 DIAGNOSIS — L40.50 PSA (PSORIATIC ARTHRITIS): ICD-10-CM

## 2021-12-30 DIAGNOSIS — G35 MS (MULTIPLE SCLEROSIS): ICD-10-CM

## 2021-12-30 RX ORDER — GABAPENTIN 300 MG/1
300 CAPSULE ORAL 3 TIMES DAILY
Qty: 90 CAPSULE | Refills: 0 | Status: SHIPPED | OUTPATIENT
Start: 2021-12-30 | End: 2022-08-03 | Stop reason: SDUPTHER

## 2021-12-30 RX ORDER — BUPROPION HYDROCHLORIDE 150 MG/1
150 TABLET, EXTENDED RELEASE ORAL 2 TIMES DAILY
Qty: 60 TABLET | Refills: 0 | Status: SHIPPED | OUTPATIENT
Start: 2021-12-30 | End: 2022-08-03 | Stop reason: SDUPTHER

## 2022-01-28 ENCOUNTER — PATIENT MESSAGE (OUTPATIENT)
Dept: RHEUMATOLOGY | Facility: CLINIC | Age: 52
End: 2022-01-28
Payer: COMMERCIAL

## 2022-01-31 ENCOUNTER — TELEPHONE (OUTPATIENT)
Dept: PODIATRY | Facility: CLINIC | Age: 52
End: 2022-01-31
Payer: COMMERCIAL

## 2022-01-31 NOTE — TELEPHONE ENCOUNTER
----- Message from Tara Marcus sent at 1/31/2022 10:51 AM CST -----  Type: Needs Medical Advice  Who Called:  Pt  Best Call Back Number: [phone]  Additional Information: Pt requesting appt for infected ingrown toenail-pt requesting appt today--please advise--Thank you

## 2022-02-28 ENCOUNTER — PATIENT MESSAGE (OUTPATIENT)
Dept: PSYCHIATRY | Facility: CLINIC | Age: 52
End: 2022-02-28
Payer: COMMERCIAL

## 2022-03-10 ENCOUNTER — PATIENT MESSAGE (OUTPATIENT)
Dept: RHEUMATOLOGY | Facility: CLINIC | Age: 52
End: 2022-03-10
Payer: COMMERCIAL

## 2022-03-17 DIAGNOSIS — L40.50 PSA (PSORIATIC ARTHRITIS): ICD-10-CM

## 2022-03-18 RX ORDER — FOLIC ACID 1 MG/1
1000 TABLET ORAL DAILY
Qty: 30 TABLET | Refills: 3 | Status: SHIPPED | OUTPATIENT
Start: 2022-03-18 | End: 2022-08-03 | Stop reason: SDUPTHER

## 2022-03-28 ENCOUNTER — PATIENT MESSAGE (OUTPATIENT)
Dept: RHEUMATOLOGY | Facility: CLINIC | Age: 52
End: 2022-03-28
Payer: COMMERCIAL

## 2022-03-28 DIAGNOSIS — L40.50 PSA (PSORIATIC ARTHRITIS): ICD-10-CM

## 2022-03-28 DIAGNOSIS — F41.9 ANXIETY: ICD-10-CM

## 2022-03-28 DIAGNOSIS — R60.9 EDEMA, UNSPECIFIED TYPE: ICD-10-CM

## 2022-03-28 DIAGNOSIS — G35 MS (MULTIPLE SCLEROSIS): ICD-10-CM

## 2022-03-28 RX ORDER — GABAPENTIN 300 MG/1
300 CAPSULE ORAL 3 TIMES DAILY
Qty: 90 CAPSULE | Refills: 0 | Status: CANCELLED | OUTPATIENT
Start: 2022-03-28

## 2022-03-28 RX ORDER — FUROSEMIDE 20 MG/1
20 TABLET ORAL DAILY PRN
Qty: 20 TABLET | Refills: 0 | Status: CANCELLED | OUTPATIENT
Start: 2022-03-28 | End: 2023-03-28

## 2022-03-28 RX ORDER — BUPROPION HYDROCHLORIDE 150 MG/1
150 TABLET, EXTENDED RELEASE ORAL 2 TIMES DAILY
Qty: 60 TABLET | Refills: 0 | Status: CANCELLED | OUTPATIENT
Start: 2022-03-28

## 2022-04-04 DIAGNOSIS — F41.9 ANXIETY: ICD-10-CM

## 2022-04-04 DIAGNOSIS — L40.50 PSA (PSORIATIC ARTHRITIS): ICD-10-CM

## 2022-04-04 DIAGNOSIS — G35 MS (MULTIPLE SCLEROSIS): ICD-10-CM

## 2022-04-04 DIAGNOSIS — R60.9 EDEMA, UNSPECIFIED TYPE: ICD-10-CM

## 2022-04-04 RX ORDER — BUPROPION HYDROCHLORIDE 150 MG/1
150 TABLET, EXTENDED RELEASE ORAL 2 TIMES DAILY
Qty: 60 TABLET | Refills: 0 | Status: CANCELLED | OUTPATIENT
Start: 2022-03-28

## 2022-04-04 RX ORDER — FUROSEMIDE 20 MG/1
20 TABLET ORAL DAILY PRN
Qty: 20 TABLET | Refills: 0 | Status: CANCELLED | OUTPATIENT
Start: 2022-03-28 | End: 2023-03-28

## 2022-04-04 RX ORDER — GABAPENTIN 300 MG/1
300 CAPSULE ORAL 3 TIMES DAILY
Qty: 90 CAPSULE | Refills: 0 | Status: CANCELLED | OUTPATIENT
Start: 2022-03-28

## 2022-04-06 DIAGNOSIS — L40.50 PSA (PSORIATIC ARTHRITIS): ICD-10-CM

## 2022-04-06 DIAGNOSIS — R60.9 EDEMA, UNSPECIFIED TYPE: ICD-10-CM

## 2022-04-06 DIAGNOSIS — G35 MS (MULTIPLE SCLEROSIS): ICD-10-CM

## 2022-04-06 DIAGNOSIS — F41.9 ANXIETY: ICD-10-CM

## 2022-04-06 RX ORDER — GABAPENTIN 300 MG/1
300 CAPSULE ORAL 3 TIMES DAILY
Qty: 90 CAPSULE | Refills: 0 | Status: CANCELLED | OUTPATIENT
Start: 2022-03-28

## 2022-04-06 RX ORDER — BUPROPION HYDROCHLORIDE 150 MG/1
150 TABLET, EXTENDED RELEASE ORAL 2 TIMES DAILY
Qty: 60 TABLET | Refills: 0 | Status: CANCELLED | OUTPATIENT
Start: 2022-03-28

## 2022-04-06 RX ORDER — FUROSEMIDE 20 MG/1
20 TABLET ORAL DAILY PRN
Qty: 20 TABLET | Refills: 0 | Status: CANCELLED | OUTPATIENT
Start: 2022-03-28 | End: 2023-03-28

## 2022-04-11 DIAGNOSIS — L40.50 PSA (PSORIATIC ARTHRITIS): ICD-10-CM

## 2022-04-11 DIAGNOSIS — G35 MS (MULTIPLE SCLEROSIS): ICD-10-CM

## 2022-04-11 DIAGNOSIS — R60.9 EDEMA, UNSPECIFIED TYPE: ICD-10-CM

## 2022-04-11 DIAGNOSIS — F41.9 ANXIETY: ICD-10-CM

## 2022-04-11 RX ORDER — FUROSEMIDE 20 MG/1
20 TABLET ORAL DAILY PRN
Qty: 20 TABLET | Refills: 0 | Status: CANCELLED | OUTPATIENT
Start: 2022-03-28 | End: 2023-03-28

## 2022-04-11 RX ORDER — GABAPENTIN 300 MG/1
300 CAPSULE ORAL 3 TIMES DAILY
Qty: 90 CAPSULE | Refills: 0 | Status: CANCELLED | OUTPATIENT
Start: 2022-03-28

## 2022-04-11 RX ORDER — BUPROPION HYDROCHLORIDE 150 MG/1
150 TABLET, EXTENDED RELEASE ORAL 2 TIMES DAILY
Qty: 60 TABLET | Refills: 0 | Status: CANCELLED | OUTPATIENT
Start: 2022-03-28

## 2022-04-20 PROBLEM — J18.9 PNEUMONIA OF RIGHT UPPER LOBE DUE TO INFECTIOUS ORGANISM: Status: RESOLVED | Noted: 2018-06-17 | Resolved: 2022-04-20

## 2022-04-20 PROBLEM — J18.9 PNEUMONIA DUE TO INFECTIOUS ORGANISM: Status: RESOLVED | Noted: 2017-11-22 | Resolved: 2022-04-20

## 2022-05-03 DIAGNOSIS — G35 OPTIC NEURITIS DUE TO MULTIPLE SCLEROSIS: ICD-10-CM

## 2022-05-03 DIAGNOSIS — L40.50 PSA (PSORIATIC ARTHRITIS): ICD-10-CM

## 2022-05-03 DIAGNOSIS — G35 MS (MULTIPLE SCLEROSIS): ICD-10-CM

## 2022-05-03 DIAGNOSIS — H46.9 OPTIC NEURITIS DUE TO MULTIPLE SCLEROSIS: ICD-10-CM

## 2022-05-03 DIAGNOSIS — D50.8 OTHER IRON DEFICIENCY ANEMIA: ICD-10-CM

## 2022-05-03 DIAGNOSIS — F41.9 ANXIETY: ICD-10-CM

## 2022-05-03 DIAGNOSIS — M06.09 RHEUMATOID ARTHRITIS OF MULTIPLE SITES WITH NEGATIVE RHEUMATOID FACTOR: ICD-10-CM

## 2022-05-03 DIAGNOSIS — R60.9 EDEMA, UNSPECIFIED TYPE: ICD-10-CM

## 2022-05-03 DIAGNOSIS — G25.81 RESTLESS LEGS: ICD-10-CM

## 2022-05-03 RX ORDER — GABAPENTIN 300 MG/1
300 CAPSULE ORAL 3 TIMES DAILY
Qty: 90 CAPSULE | Refills: 0 | OUTPATIENT
Start: 2022-05-03

## 2022-05-03 RX ORDER — ALPRAZOLAM 1 MG/1
1 TABLET ORAL 2 TIMES DAILY
Qty: 60 TABLET | Refills: 3 | Status: CANCELLED | OUTPATIENT
Start: 2022-05-03

## 2022-05-03 RX ORDER — BUPROPION HYDROCHLORIDE 150 MG/1
150 TABLET, EXTENDED RELEASE ORAL 2 TIMES DAILY
Qty: 60 TABLET | Refills: 0 | OUTPATIENT
Start: 2022-05-03

## 2022-05-03 RX ORDER — FUROSEMIDE 20 MG/1
20 TABLET ORAL DAILY PRN
Qty: 20 TABLET | Refills: 0 | OUTPATIENT
Start: 2022-05-03 | End: 2023-05-03

## 2022-05-04 DIAGNOSIS — D50.8 OTHER IRON DEFICIENCY ANEMIA: ICD-10-CM

## 2022-05-04 DIAGNOSIS — G35 MS (MULTIPLE SCLEROSIS): ICD-10-CM

## 2022-05-04 DIAGNOSIS — F41.9 ANXIETY: ICD-10-CM

## 2022-05-04 DIAGNOSIS — H46.9 OPTIC NEURITIS DUE TO MULTIPLE SCLEROSIS: ICD-10-CM

## 2022-05-04 DIAGNOSIS — G35 OPTIC NEURITIS DUE TO MULTIPLE SCLEROSIS: ICD-10-CM

## 2022-05-04 DIAGNOSIS — M06.09 RHEUMATOID ARTHRITIS OF MULTIPLE SITES WITH NEGATIVE RHEUMATOID FACTOR: ICD-10-CM

## 2022-05-04 DIAGNOSIS — L40.50 PSA (PSORIATIC ARTHRITIS): ICD-10-CM

## 2022-05-04 DIAGNOSIS — G25.81 RESTLESS LEGS: ICD-10-CM

## 2022-05-04 RX ORDER — ALPRAZOLAM 1 MG/1
1 TABLET ORAL 2 TIMES DAILY
Qty: 60 TABLET | Refills: 3 | Status: CANCELLED | OUTPATIENT
Start: 2022-05-03

## 2022-05-05 DIAGNOSIS — M06.09 RHEUMATOID ARTHRITIS OF MULTIPLE SITES WITH NEGATIVE RHEUMATOID FACTOR: ICD-10-CM

## 2022-05-05 DIAGNOSIS — F41.9 ANXIETY: ICD-10-CM

## 2022-05-05 DIAGNOSIS — G35 OPTIC NEURITIS DUE TO MULTIPLE SCLEROSIS: ICD-10-CM

## 2022-05-05 DIAGNOSIS — G35 MS (MULTIPLE SCLEROSIS): ICD-10-CM

## 2022-05-05 DIAGNOSIS — G25.81 RESTLESS LEGS: ICD-10-CM

## 2022-05-05 DIAGNOSIS — D50.8 OTHER IRON DEFICIENCY ANEMIA: ICD-10-CM

## 2022-05-05 DIAGNOSIS — H46.9 OPTIC NEURITIS DUE TO MULTIPLE SCLEROSIS: ICD-10-CM

## 2022-05-05 DIAGNOSIS — L40.50 PSA (PSORIATIC ARTHRITIS): ICD-10-CM

## 2022-05-08 RX ORDER — ALPRAZOLAM 1 MG/1
1 TABLET ORAL 2 TIMES DAILY
Qty: 60 TABLET | Refills: 3 | OUTPATIENT
Start: 2022-05-08

## 2022-06-09 ENCOUNTER — TELEPHONE (OUTPATIENT)
Dept: PHARMACY | Facility: CLINIC | Age: 52
End: 2022-06-09
Payer: COMMERCIAL

## 2022-06-24 ENCOUNTER — PATIENT MESSAGE (OUTPATIENT)
Dept: PSYCHIATRY | Facility: CLINIC | Age: 52
End: 2022-06-24
Payer: COMMERCIAL

## 2022-07-05 DIAGNOSIS — M06.09 RHEUMATOID ARTHRITIS OF MULTIPLE SITES WITH NEGATIVE RHEUMATOID FACTOR: ICD-10-CM

## 2022-07-05 DIAGNOSIS — G25.81 RESTLESS LEGS: ICD-10-CM

## 2022-07-05 DIAGNOSIS — R60.9 EDEMA, UNSPECIFIED TYPE: ICD-10-CM

## 2022-07-05 DIAGNOSIS — H46.9 OPTIC NEURITIS DUE TO MULTIPLE SCLEROSIS: ICD-10-CM

## 2022-07-05 DIAGNOSIS — F41.9 ANXIETY: ICD-10-CM

## 2022-07-05 DIAGNOSIS — D50.8 OTHER IRON DEFICIENCY ANEMIA: ICD-10-CM

## 2022-07-05 DIAGNOSIS — G35 OPTIC NEURITIS DUE TO MULTIPLE SCLEROSIS: ICD-10-CM

## 2022-07-05 DIAGNOSIS — G35 MS (MULTIPLE SCLEROSIS): ICD-10-CM

## 2022-07-05 DIAGNOSIS — L40.50 PSA (PSORIATIC ARTHRITIS): ICD-10-CM

## 2022-07-05 RX ORDER — FUROSEMIDE 20 MG/1
20 TABLET ORAL DAILY PRN
Qty: 20 TABLET | Refills: 0 | OUTPATIENT
Start: 2022-07-05 | End: 2023-07-05

## 2022-07-07 RX ORDER — ALPRAZOLAM 1 MG/1
1 TABLET ORAL 2 TIMES DAILY
Qty: 60 TABLET | Refills: 3 | OUTPATIENT
Start: 2022-07-07

## 2022-08-03 DIAGNOSIS — G35 MS (MULTIPLE SCLEROSIS): ICD-10-CM

## 2022-08-03 DIAGNOSIS — F41.9 ANXIETY: ICD-10-CM

## 2022-08-03 DIAGNOSIS — L40.50 PSA (PSORIATIC ARTHRITIS): ICD-10-CM

## 2022-08-03 DIAGNOSIS — R60.9 EDEMA, UNSPECIFIED TYPE: ICD-10-CM

## 2022-08-03 DIAGNOSIS — D50.8 OTHER IRON DEFICIENCY ANEMIA: ICD-10-CM

## 2022-08-03 DIAGNOSIS — G35 OPTIC NEURITIS DUE TO MULTIPLE SCLEROSIS: ICD-10-CM

## 2022-08-03 DIAGNOSIS — M06.09 RHEUMATOID ARTHRITIS OF MULTIPLE SITES WITH NEGATIVE RHEUMATOID FACTOR: ICD-10-CM

## 2022-08-03 DIAGNOSIS — G25.81 RESTLESS LEGS: ICD-10-CM

## 2022-08-03 DIAGNOSIS — H46.9 OPTIC NEURITIS DUE TO MULTIPLE SCLEROSIS: ICD-10-CM

## 2022-08-03 RX ORDER — BUPROPION HYDROCHLORIDE 150 MG/1
150 TABLET, EXTENDED RELEASE ORAL 2 TIMES DAILY
Qty: 60 TABLET | Refills: 0 | Status: CANCELLED | OUTPATIENT
Start: 2022-08-03

## 2022-08-03 RX ORDER — FOLIC ACID 1 MG/1
1000 TABLET ORAL DAILY
Qty: 30 TABLET | Refills: 3 | Status: CANCELLED | OUTPATIENT
Start: 2022-08-03

## 2022-08-03 RX ORDER — GABAPENTIN 300 MG/1
300 CAPSULE ORAL 3 TIMES DAILY
Qty: 90 CAPSULE | Refills: 0 | Status: CANCELLED | OUTPATIENT
Start: 2022-08-03

## 2022-08-03 RX ORDER — FUROSEMIDE 20 MG/1
20 TABLET ORAL DAILY PRN
Qty: 20 TABLET | Refills: 0 | Status: CANCELLED | OUTPATIENT
Start: 2022-08-03 | End: 2023-08-03

## 2022-08-03 RX ORDER — ALPRAZOLAM 1 MG/1
1 TABLET ORAL 2 TIMES DAILY
Qty: 60 TABLET | Refills: 3 | OUTPATIENT
Start: 2022-08-03

## 2022-08-05 ENCOUNTER — TELEPHONE (OUTPATIENT)
Dept: RHEUMATOLOGY | Facility: CLINIC | Age: 52
End: 2022-08-05
Payer: COMMERCIAL

## 2022-08-05 ENCOUNTER — PATIENT MESSAGE (OUTPATIENT)
Dept: RHEUMATOLOGY | Facility: CLINIC | Age: 52
End: 2022-08-05

## 2022-08-05 NOTE — TELEPHONE ENCOUNTER
----- Message from Iris Purcell sent at 8/5/2022 10:22 AM CDT -----  Regarding: pt called  Name of Who is Calling: DENISA ALCARAZ [[id number]]      What is the request in detail: pt states she has been waiting for an hour for her virtual visit with Dr Bianchi and the video disconnected. Please advise        Can the clinic reply by [facility]: No       What Number to Call Back if not in [facility]: [phone] (home)

## 2022-08-08 DIAGNOSIS — R60.9 EDEMA, UNSPECIFIED TYPE: ICD-10-CM

## 2022-08-08 DIAGNOSIS — L40.50 PSA (PSORIATIC ARTHRITIS): ICD-10-CM

## 2022-08-08 DIAGNOSIS — F41.9 ANXIETY: ICD-10-CM

## 2022-08-08 DIAGNOSIS — G35 MS (MULTIPLE SCLEROSIS): ICD-10-CM

## 2022-08-09 RX ORDER — FOLIC ACID 1 MG/1
1000 TABLET ORAL DAILY
Qty: 30 TABLET | Refills: 3 | Status: SHIPPED | OUTPATIENT
Start: 2022-08-09 | End: 2023-11-28 | Stop reason: SDUPTHER

## 2022-08-09 RX ORDER — FUROSEMIDE 20 MG/1
20 TABLET ORAL DAILY PRN
Qty: 20 TABLET | Refills: 0 | Status: SHIPPED | OUTPATIENT
Start: 2022-08-09 | End: 2022-12-30 | Stop reason: SDUPTHER

## 2022-08-09 RX ORDER — BUPROPION HYDROCHLORIDE 150 MG/1
150 TABLET, EXTENDED RELEASE ORAL 2 TIMES DAILY
Qty: 60 TABLET | Refills: 0 | Status: SHIPPED | OUTPATIENT
Start: 2022-08-09 | End: 2022-12-30 | Stop reason: SDUPTHER

## 2022-08-09 RX ORDER — GABAPENTIN 300 MG/1
300 CAPSULE ORAL 3 TIMES DAILY
Qty: 90 CAPSULE | Refills: 0 | Status: SHIPPED | OUTPATIENT
Start: 2022-08-09 | End: 2023-10-11 | Stop reason: SDUPTHER

## 2022-08-11 ENCOUNTER — PATIENT MESSAGE (OUTPATIENT)
Dept: RHEUMATOLOGY | Facility: CLINIC | Age: 52
End: 2022-08-11
Payer: COMMERCIAL

## 2022-09-28 ENCOUNTER — PATIENT MESSAGE (OUTPATIENT)
Dept: RHEUMATOLOGY | Facility: CLINIC | Age: 52
End: 2022-09-28
Payer: COMMERCIAL

## 2022-09-28 DIAGNOSIS — G35 OPTIC NEURITIS DUE TO MULTIPLE SCLEROSIS: ICD-10-CM

## 2022-09-28 DIAGNOSIS — M06.09 RHEUMATOID ARTHRITIS OF MULTIPLE SITES WITH NEGATIVE RHEUMATOID FACTOR: ICD-10-CM

## 2022-09-28 DIAGNOSIS — G25.81 RESTLESS LEGS: ICD-10-CM

## 2022-09-28 DIAGNOSIS — G35 MS (MULTIPLE SCLEROSIS): ICD-10-CM

## 2022-09-28 DIAGNOSIS — D50.8 OTHER IRON DEFICIENCY ANEMIA: ICD-10-CM

## 2022-09-28 DIAGNOSIS — F41.9 ANXIETY: ICD-10-CM

## 2022-09-28 DIAGNOSIS — E53.8 B12 DEFICIENCY: ICD-10-CM

## 2022-09-28 DIAGNOSIS — L40.50 PSA (PSORIATIC ARTHRITIS): ICD-10-CM

## 2022-09-28 DIAGNOSIS — H46.9 OPTIC NEURITIS DUE TO MULTIPLE SCLEROSIS: ICD-10-CM

## 2022-09-28 RX ORDER — CYANOCOBALAMIN 1000 UG/ML
1000 INJECTION, SOLUTION INTRAMUSCULAR; SUBCUTANEOUS
Qty: 10 ML | Refills: 0 | Status: CANCELLED | OUTPATIENT
Start: 2022-09-28

## 2022-09-28 RX ORDER — ALPRAZOLAM 1 MG/1
1 TABLET ORAL 2 TIMES DAILY
Qty: 60 TABLET | Refills: 3 | Status: CANCELLED | OUTPATIENT
Start: 2022-09-28

## 2022-09-29 DIAGNOSIS — H46.9 OPTIC NEURITIS DUE TO MULTIPLE SCLEROSIS: ICD-10-CM

## 2022-09-29 DIAGNOSIS — G35 MS (MULTIPLE SCLEROSIS): ICD-10-CM

## 2022-09-29 DIAGNOSIS — L40.50 PSA (PSORIATIC ARTHRITIS): ICD-10-CM

## 2022-09-29 DIAGNOSIS — M06.09 RHEUMATOID ARTHRITIS OF MULTIPLE SITES WITH NEGATIVE RHEUMATOID FACTOR: ICD-10-CM

## 2022-09-29 DIAGNOSIS — D50.8 OTHER IRON DEFICIENCY ANEMIA: ICD-10-CM

## 2022-09-29 DIAGNOSIS — G35 OPTIC NEURITIS DUE TO MULTIPLE SCLEROSIS: ICD-10-CM

## 2022-09-29 DIAGNOSIS — G25.81 RESTLESS LEGS: ICD-10-CM

## 2022-09-29 DIAGNOSIS — F41.9 ANXIETY: ICD-10-CM

## 2022-09-29 RX ORDER — ALPRAZOLAM 1 MG/1
1 TABLET ORAL 2 TIMES DAILY
Qty: 60 TABLET | Refills: 3 | OUTPATIENT
Start: 2022-09-29

## 2022-09-29 RX ORDER — CYANOCOBALAMIN 1000 UG/ML
1000 INJECTION, SOLUTION INTRAMUSCULAR; SUBCUTANEOUS
Qty: 10 ML | Refills: 0 | Status: SHIPPED | OUTPATIENT
Start: 2022-09-29 | End: 2023-11-28 | Stop reason: SDUPTHER

## 2022-10-03 ENCOUNTER — PATIENT MESSAGE (OUTPATIENT)
Dept: RHEUMATOLOGY | Facility: CLINIC | Age: 52
End: 2022-10-03
Payer: COMMERCIAL

## 2022-11-04 DIAGNOSIS — L40.50 PSA (PSORIATIC ARTHRITIS): ICD-10-CM

## 2022-11-04 DIAGNOSIS — G35 OPTIC NEURITIS DUE TO MULTIPLE SCLEROSIS: ICD-10-CM

## 2022-11-04 DIAGNOSIS — H46.9 OPTIC NEURITIS DUE TO MULTIPLE SCLEROSIS: ICD-10-CM

## 2022-11-04 DIAGNOSIS — G25.81 RESTLESS LEGS: ICD-10-CM

## 2022-11-04 DIAGNOSIS — J40 BRONCHITIS: ICD-10-CM

## 2022-11-04 DIAGNOSIS — R60.9 EDEMA, UNSPECIFIED TYPE: ICD-10-CM

## 2022-11-04 DIAGNOSIS — F41.9 ANXIETY: ICD-10-CM

## 2022-11-04 DIAGNOSIS — D50.8 OTHER IRON DEFICIENCY ANEMIA: ICD-10-CM

## 2022-11-04 DIAGNOSIS — M06.09 RHEUMATOID ARTHRITIS OF MULTIPLE SITES WITH NEGATIVE RHEUMATOID FACTOR: ICD-10-CM

## 2022-11-04 DIAGNOSIS — G35 MS (MULTIPLE SCLEROSIS): ICD-10-CM

## 2022-11-04 RX ORDER — BUPROPION HYDROCHLORIDE 150 MG/1
150 TABLET, EXTENDED RELEASE ORAL 2 TIMES DAILY
Qty: 60 TABLET | Refills: 0 | Status: CANCELLED | OUTPATIENT
Start: 2022-11-04

## 2022-11-04 RX ORDER — ALPRAZOLAM 1 MG/1
1 TABLET ORAL 2 TIMES DAILY
Qty: 60 TABLET | Refills: 3 | Status: CANCELLED | OUTPATIENT
Start: 2022-11-04

## 2022-11-04 RX ORDER — ALBUTEROL SULFATE 2.5 MG/.5ML
2.5 SOLUTION RESPIRATORY (INHALATION) EVERY 4 HOURS PRN
Qty: 30 EACH | Refills: 2 | Status: CANCELLED | OUTPATIENT
Start: 2022-11-04 | End: 2022-11-28

## 2022-11-04 RX ORDER — FUROSEMIDE 20 MG/1
20 TABLET ORAL DAILY PRN
Qty: 20 TABLET | Refills: 0 | Status: CANCELLED | OUTPATIENT
Start: 2022-11-04 | End: 2023-11-04

## 2022-11-04 RX ORDER — ALPRAZOLAM 1 MG/1
1 TABLET ORAL 2 TIMES DAILY
Qty: 60 TABLET | Refills: 3 | OUTPATIENT
Start: 2022-11-04

## 2022-11-07 RX ORDER — ALBUTEROL SULFATE 0.83 MG/ML
2.5 SOLUTION RESPIRATORY (INHALATION) EVERY 4 HOURS PRN
Qty: 30 EACH | Refills: 2 | Status: SHIPPED | OUTPATIENT
Start: 2022-11-07 | End: 2023-12-26 | Stop reason: SDUPTHER

## 2022-12-01 ENCOUNTER — PATIENT MESSAGE (OUTPATIENT)
Dept: PSYCHIATRY | Facility: CLINIC | Age: 52
End: 2022-12-01
Payer: COMMERCIAL

## 2022-12-05 ENCOUNTER — TELEPHONE (OUTPATIENT)
Dept: PHARMACY | Facility: CLINIC | Age: 52
End: 2022-12-05
Payer: COMMERCIAL

## 2022-12-30 DIAGNOSIS — R60.9 EDEMA, UNSPECIFIED TYPE: ICD-10-CM

## 2022-12-30 DIAGNOSIS — F41.9 ANXIETY: ICD-10-CM

## 2023-01-03 RX ORDER — BUPROPION HYDROCHLORIDE 150 MG/1
150 TABLET, EXTENDED RELEASE ORAL 2 TIMES DAILY
Qty: 60 TABLET | Refills: 0 | Status: SHIPPED | OUTPATIENT
Start: 2023-01-03 | End: 2023-03-09 | Stop reason: SDUPTHER

## 2023-01-03 RX ORDER — FUROSEMIDE 20 MG/1
20 TABLET ORAL DAILY PRN
Qty: 20 TABLET | Refills: 0 | Status: SHIPPED | OUTPATIENT
Start: 2023-01-03 | End: 2023-03-09 | Stop reason: SDUPTHER

## 2023-02-20 ENCOUNTER — PATIENT MESSAGE (OUTPATIENT)
Dept: PSYCHIATRY | Facility: CLINIC | Age: 53
End: 2023-02-20
Payer: COMMERCIAL

## 2023-03-09 DIAGNOSIS — R60.9 EDEMA, UNSPECIFIED TYPE: ICD-10-CM

## 2023-03-09 DIAGNOSIS — F41.9 ANXIETY: ICD-10-CM

## 2023-03-09 RX ORDER — BUPROPION HYDROCHLORIDE 150 MG/1
150 TABLET, EXTENDED RELEASE ORAL 2 TIMES DAILY
Qty: 60 TABLET | Refills: 0 | Status: SHIPPED | OUTPATIENT
Start: 2023-03-09 | End: 2023-04-27 | Stop reason: SDUPTHER

## 2023-03-09 RX ORDER — FUROSEMIDE 20 MG/1
20 TABLET ORAL DAILY PRN
Qty: 20 TABLET | Refills: 0 | Status: SHIPPED | OUTPATIENT
Start: 2023-03-09 | End: 2023-04-27 | Stop reason: SDUPTHER

## 2023-04-12 ENCOUNTER — OFFICE VISIT (OUTPATIENT)
Dept: RHEUMATOLOGY | Facility: CLINIC | Age: 53
End: 2023-04-12
Payer: COMMERCIAL

## 2023-04-12 VITALS
HEIGHT: 63 IN | DIASTOLIC BLOOD PRESSURE: 80 MMHG | WEIGHT: 281.31 LBS | HEART RATE: 112 BPM | SYSTOLIC BLOOD PRESSURE: 123 MMHG | BODY MASS INDEX: 49.84 KG/M2

## 2023-04-12 DIAGNOSIS — L40.50 PSORIATIC ARTHRITIS: ICD-10-CM

## 2023-04-12 DIAGNOSIS — Z85.850 HISTORY OF THYROID CANCER: ICD-10-CM

## 2023-04-12 DIAGNOSIS — R90.89 ABNORMAL FINDING ON MRI OF BRAIN: ICD-10-CM

## 2023-04-12 DIAGNOSIS — M06.09 RHEUMATOID ARTHRITIS OF MULTIPLE SITES WITH NEGATIVE RHEUMATOID FACTOR: ICD-10-CM

## 2023-04-12 DIAGNOSIS — G35 MS (MULTIPLE SCLEROSIS): Primary | ICD-10-CM

## 2023-04-12 DIAGNOSIS — G47.00 INSOMNIA, UNSPECIFIED TYPE: ICD-10-CM

## 2023-04-12 DIAGNOSIS — C73 PRIMARY THYROID PAPILLARY CARCINOMA: ICD-10-CM

## 2023-04-12 DIAGNOSIS — F19.20 STEROID DEPENDENT: ICD-10-CM

## 2023-04-12 DIAGNOSIS — E09.69 DRUG OR CHEMICAL INDUCED DIABETES MELLITUS WITH OTHER SPECIFIED COMPLICATION, UNSPECIFIED WHETHER LONG TERM INSULIN USE: ICD-10-CM

## 2023-04-12 DIAGNOSIS — G89.29 OTHER CHRONIC PAIN: ICD-10-CM

## 2023-04-12 PROCEDURE — 99215 PR OFFICE/OUTPT VISIT, EST, LEVL V, 40-54 MIN: ICD-10-PCS | Mod: 25,S$GLB,, | Performed by: INTERNAL MEDICINE

## 2023-04-12 PROCEDURE — 3079F PR MOST RECENT DIASTOLIC BLOOD PRESSURE 80-89 MM HG: ICD-10-PCS | Mod: CPTII,S$GLB,, | Performed by: INTERNAL MEDICINE

## 2023-04-12 PROCEDURE — 99999 PR PBB SHADOW E&M-EST. PATIENT-LVL V: ICD-10-PCS | Mod: PBBFAC,,, | Performed by: INTERNAL MEDICINE

## 2023-04-12 PROCEDURE — 3008F BODY MASS INDEX DOCD: CPT | Mod: CPTII,S$GLB,, | Performed by: INTERNAL MEDICINE

## 2023-04-12 PROCEDURE — 99215 OFFICE O/P EST HI 40 MIN: CPT | Mod: 25,S$GLB,, | Performed by: INTERNAL MEDICINE

## 2023-04-12 PROCEDURE — 3008F PR BODY MASS INDEX (BMI) DOCUMENTED: ICD-10-PCS | Mod: CPTII,S$GLB,, | Performed by: INTERNAL MEDICINE

## 2023-04-12 PROCEDURE — 1160F RVW MEDS BY RX/DR IN RCRD: CPT | Mod: CPTII,S$GLB,, | Performed by: INTERNAL MEDICINE

## 2023-04-12 PROCEDURE — 3079F DIAST BP 80-89 MM HG: CPT | Mod: CPTII,S$GLB,, | Performed by: INTERNAL MEDICINE

## 2023-04-12 PROCEDURE — 3074F PR MOST RECENT SYSTOLIC BLOOD PRESSURE < 130 MM HG: ICD-10-PCS | Mod: CPTII,S$GLB,, | Performed by: INTERNAL MEDICINE

## 2023-04-12 PROCEDURE — 1160F PR REVIEW ALL MEDS BY PRESCRIBER/CLIN PHARMACIST DOCUMENTED: ICD-10-PCS | Mod: CPTII,S$GLB,, | Performed by: INTERNAL MEDICINE

## 2023-04-12 PROCEDURE — 96372 THER/PROPH/DIAG INJ SC/IM: CPT | Mod: S$GLB,,, | Performed by: INTERNAL MEDICINE

## 2023-04-12 PROCEDURE — 99999 PR PBB SHADOW E&M-EST. PATIENT-LVL V: CPT | Mod: PBBFAC,,, | Performed by: INTERNAL MEDICINE

## 2023-04-12 PROCEDURE — 96372 PR INJECTION,THERAP/PROPH/DIAG2ST, IM OR SUBCUT: ICD-10-PCS | Mod: S$GLB,,, | Performed by: INTERNAL MEDICINE

## 2023-04-12 PROCEDURE — 1159F MED LIST DOCD IN RCRD: CPT | Mod: CPTII,S$GLB,, | Performed by: INTERNAL MEDICINE

## 2023-04-12 PROCEDURE — 1159F PR MEDICATION LIST DOCUMENTED IN MEDICAL RECORD: ICD-10-PCS | Mod: CPTII,S$GLB,, | Performed by: INTERNAL MEDICINE

## 2023-04-12 PROCEDURE — 3074F SYST BP LT 130 MM HG: CPT | Mod: CPTII,S$GLB,, | Performed by: INTERNAL MEDICINE

## 2023-04-12 RX ORDER — ZALEPLON 10 MG/1
10 CAPSULE ORAL NIGHTLY
Qty: 30 CAPSULE | Refills: 3 | Status: SHIPPED | OUTPATIENT
Start: 2023-04-12 | End: 2023-04-12

## 2023-04-12 RX ORDER — SEMAGLUTIDE 1.34 MG/ML
0.5 INJECTION, SOLUTION SUBCUTANEOUS
Qty: 1.5 ML | Refills: 11 | Status: SHIPPED | OUTPATIENT
Start: 2023-05-11 | End: 2023-05-26 | Stop reason: SDUPTHER

## 2023-04-12 RX ORDER — KETOROLAC TROMETHAMINE 30 MG/ML
60 INJECTION, SOLUTION INTRAMUSCULAR; INTRAVENOUS
Qty: 2 ML | Refills: 11 | Status: SHIPPED | OUTPATIENT
Start: 2023-04-12 | End: 2024-04-11

## 2023-04-12 RX ORDER — HEPARIN 100 UNIT/ML
500 SYRINGE INTRAVENOUS
OUTPATIENT
Start: 2023-04-12

## 2023-04-12 RX ORDER — ZALEPLON 10 MG/1
10 CAPSULE ORAL NIGHTLY
Qty: 30 CAPSULE | Refills: 4 | Status: SHIPPED | OUTPATIENT
Start: 2023-04-12 | End: 2023-05-14

## 2023-04-12 RX ORDER — CYANOCOBALAMIN 1000 UG/ML
1000 INJECTION, SOLUTION INTRAMUSCULAR; SUBCUTANEOUS
Status: COMPLETED | OUTPATIENT
Start: 2023-04-12 | End: 2023-04-12

## 2023-04-12 RX ORDER — DIPHENHYDRAMINE HYDROCHLORIDE 50 MG/ML
25 INJECTION INTRAMUSCULAR; INTRAVENOUS
OUTPATIENT
Start: 2023-04-12

## 2023-04-12 RX ORDER — SEMAGLUTIDE 1.34 MG/ML
0.25 INJECTION, SOLUTION SUBCUTANEOUS
Qty: 1.5 ML | Refills: 0 | Status: SHIPPED | OUTPATIENT
Start: 2023-04-12 | End: 2023-05-26

## 2023-04-12 RX ORDER — FAMOTIDINE 10 MG/ML
20 INJECTION INTRAVENOUS
OUTPATIENT
Start: 2023-04-12

## 2023-04-12 RX ORDER — SYRINGE REUSABLE, 3 ML
1 SYRINGE, REUSABLE MISCELLANEOUS DAILY
Qty: 25 EACH | Refills: 3 | Status: SHIPPED | OUTPATIENT
Start: 2023-04-12

## 2023-04-12 RX ORDER — KETOROLAC TROMETHAMINE 30 MG/ML
60 INJECTION, SOLUTION INTRAMUSCULAR; INTRAVENOUS
Status: COMPLETED | OUTPATIENT
Start: 2023-04-12 | End: 2023-04-12

## 2023-04-12 RX ORDER — ACETAMINOPHEN 325 MG/1
650 TABLET ORAL
OUTPATIENT
Start: 2023-04-12

## 2023-04-12 RX ORDER — SODIUM CHLORIDE 0.9 % (FLUSH) 0.9 %
10 SYRINGE (ML) INJECTION
OUTPATIENT
Start: 2023-04-12

## 2023-04-12 RX ADMIN — CYANOCOBALAMIN 1000 MCG: 1000 INJECTION, SOLUTION INTRAMUSCULAR; SUBCUTANEOUS at 05:04

## 2023-04-12 RX ADMIN — KETOROLAC TROMETHAMINE 60 MG: 30 INJECTION, SOLUTION INTRAMUSCULAR; INTRAVENOUS at 05:04

## 2023-04-12 NOTE — PROGRESS NOTES
Subjective:       Patient ID: Kayla Small is a 52 y.o. female.    Chief Complaint: Disease Management    Follow up: 50 year old female who presents to clinic for follow up on psoriatic arthritis, rheumatoid arthritis.she had her teeth pulled due to infection and abscess. She sig anemic and rituxan is on hold.She states fever has resolved, but she continues to have shortness of breath and productive cough.No pulmonary f/u or repeat imaging since that time.  Neurology referral placed at last visit, but no appt scheduled. No recent MS flares. She is doing poorly from an arthritis standpoint. She is taking celebrex daily. She complains of generalized pain throughout her body. She reports pain in her hands, wrists, elbows, shoulders, hips, knees, and ankles. Pain is constant/aching.      Current tx:  1. rituxan - past     Review of Systems   Constitutional:  Positive for activity change. Negative for appetite change, chills and unexpected weight change.   HENT:  Positive for congestion and mouth sores.    Eyes:  Negative for redness and visual disturbance.   Respiratory:  Negative for cough and shortness of breath.    Cardiovascular:  Positive for leg swelling. Negative for chest pain and palpitations.   Gastrointestinal:  Negative for abdominal pain, constipation, diarrhea, nausea and vomiting.   Genitourinary:  Negative for genital sores.   Musculoskeletal:  Positive for arthralgias.   Skin:  Negative for rash.   Allergic/Immunologic: Positive for immunocompromised state.   Neurological:  Negative for dizziness, weakness and light-headedness.   Hematological:  Bruises/bleeds easily.   Psychiatric/Behavioral:  Positive for sleep disturbance. Negative for dysphoric mood. The patient is nervous/anxious.        Objective:     Vitals:    04/12/23 1529   BP: 123/80   Pulse: (!) 112       Past Medical History:   Diagnosis Date    Anemia     Arthritis     Cancer     Thyroid    Depression     Gastritis     GERD  (gastroesophageal reflux disease)     Migraine headache     Multiple sclerosis     Psoriatic arthritis     Thyroid cancer      Past Surgical History:   Procedure Laterality Date    breast augmentation      R and L breast  removed     SECTION, CLASSIC      x 2    COLONOSCOPY      HYSTERECTOMY      TVH with BSO, anemia postoperatively    THYROID SURGERY      tumor removed behind L ear            Physical Exam   Constitutional: She is oriented to person, place, and time. She appears distressed.   HENT:   Head: Normocephalic and atraumatic.   Mouth/Throat: Oropharynx is clear and moist.   Eyes: Pupils are equal, round, and reactive to light.   Neck: No thyromegaly present.   Cardiovascular: Normal rate, regular rhythm and normal heart sounds. Exam reveals no gallop and no friction rub.   No murmur heard.  Pulmonary/Chest: Breath sounds normal. She has no wheezes. She has no rales. She exhibits no tenderness.   Abdominal: There is no abdominal tenderness. There is no rebound and no guarding.   Musculoskeletal:         General: Swelling, tenderness and deformity present.      Right shoulder: Tenderness present.      Left shoulder: Tenderness present.      Right elbow: Normal.      Left elbow: Normal.      Right wrist: Swelling and tenderness present.      Left wrist: Swelling and tenderness present.      Cervical back: Neck supple.      Right knee: No effusion. Tenderness present.      Left knee: No effusion. Tenderness present.      Left ankle: Swelling present.   Lymphadenopathy:     She has no cervical adenopathy.   Neurological: She is alert and oriented to person, place, and time. Gait normal.   Skin: No rash noted. No erythema. No pallor.   Psychiatric: Mood, affect and judgment normal.   Nursing note and vitals reviewed.      Right Side Rheumatological Exam     Examination finds the elbow normal.    The patient is tender to palpation of the shoulder, wrist, knee, 1st PIP, 1st MCP, 2nd PIP, 2nd MCP,  3rd PIP, 3rd MCP, 4th PIP, 4th MCP, 5th PIP and 5th MCP    She has swelling of the wrist, 1st PIP, 1st MCP, 2nd PIP, 2nd MCP, 3rd PIP, 3rd MCP, 4th PIP, 4th MCP, 5th PIP and 5th MCP    The patient has an enlarged wrist    Shoulder Exam   Tenderness Location: no tenderness    Range of Motion   Active abduction:  abnormal   Adduction: abnormal  Sensation: normal    Knee Exam   Tenderness Location: lateral joint line  Patellofemoral Crepitus: positive  Effusion: negative  Sensation: normal    Hip Exam   Tenderness Location: posterior  Sensation: normal    Elbow/Wrist Exam   Tenderness Location: no tenderness  Sensation: normal    Muscle Strength (0-5 scale):  Neck Flexion:  2  Neck Extension: 2  : 2/5     Left Side Rheumatological Exam     Examination finds the elbow normal.    The patient is tender to palpation of the shoulder, wrist, knee, 1st PIP, 1st MCP, 2nd PIP, 2nd MCP, 3rd PIP, 3rd MCP, 4th PIP, 4th MCP, 5th PIP, 5th MCP and temporomandibular.    She has swelling of the wrist, 1st PIP, 1st MCP, 2nd PIP, 2nd MCP, 3rd PIP, 3rd MCP, 4th PIP, 4th MCP, 5th PIP, 5th MCP, 1st CMC, 2nd DIP, 3rd DIP, 4th DIP, 5th DIP, knee, 1st MTP, 2nd MTP, 3rd MTP, 4th MTP, 1st toe IP, 2nd toe IP, 3rd toe IP, 4th toe IP and 5th toe IP    The patient has an enlarged wrist, 1st CMC, 2nd DIP, 3rd DIP, 4th DIP, 5th DIP, 1st toe IP, 2nd toe IP, 3rd toe IP, 4th toe IP and 5th toe IP.    Shoulder Exam   Tenderness Location: acromioclavicular joint    Range of Motion   Active abduction:  abnormal   Sensation: normal    Knee Exam     Patellofemoral Crepitus: positive  Effusion: negative  Sensation: normal    Hip Exam   Tenderness Location: posterior  Sensation: normal    Elbow/Wrist Exam   Sensation: normal    Muscle Strength (0-5 scale):  Neck Flexion:  2  Neck Extension: 2  :  1/5       Back/Neck Exam   General Inspection   Gait: normal        Results for orders placed or performed during the hospital encounter of 02/05/23  "  Influenza A & B by Molecular    Specimen: Nasopharyngeal Swab   Result Value Ref Range    Influenza A, Molecular Negative Negative    Influenza B, Molecular Negative Negative    Flu A & B Source nasal swab    Strep A by Molecular Method    Specimen: Throat   Result Value Ref Range    Group A Strep, Molecular Positive (A) Negative   COVID-19 Rapid Screening   Result Value Ref Range    SARS-CoV-2 RNA, Amplification, Qual Negative Negative     *Note: Due to a large number of results and/or encounters for the requested time period, some results have not been displayed. A complete set of results can be found in Results Review.     I      Assessment:       1. MS (multiple sclerosis)    2. Abnormal finding on MRI of brain    3. Primary thyroid papillary carcinoma    4. History of thyroid cancer    5. Psoriatic arthritis    6. Other chronic pain    7. Rheumatoid arthritis of multiple sites with negative rheumatoid factor    8. 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 Steroid dependent    10. Insomnia, unspecified type              Plan:       MS (multiple sclerosis)  -     ketorolac injection 60 mg  -     cyanocobalamin injection 1,000 mcg  -     MOTORIZED SCOOTER FOR HOME USE  -     ketorolac (TORADOL) 30 mg/mL (1 mL) injection; Inject 2 mLs (60 mg total) into the vein every 28 days.  Dispense: 2 mL; Refill: 11  -     needle, disp, 25 gauge 25 gauge x 1" Ndle; 1 each by Misc.(Non-Drug; Combo Route) route daily as needed (shoot).  Dispense: 25 each; Refill: 3  -     syringe, reusable, 3 mL SyrR; 1 Syringe by Misc.(Non-Drug; Combo Route) route once daily.  Dispense: 25 each; Refill: 3  -     semaglutide (OZEMPIC) 0.25 mg or 0.5 mg(2 mg/1.5 mL) pen injector; Inject 0.25 mg into the skin every 7 days. For 4 weeks  Dispense: 1.5 mL; Refill: 0  -     semaglutide (OZEMPIC) 0.25 mg or 0.5 mg(2 mg/1.5 mL) pen injector; Inject 0.5 mg into the skin " "every 7 days.  Dispense: 1.5 mL; Refill: 11  -     Lymphocyte Profile II; Future; Expected date: 04/12/2023  -     IgA; Future; Expected date: 04/12/2023  -     Humoral Immune Eval (Pneumo Serotypes) With H. Flu; Future; Expected date: 04/12/2023  -     IgM; Future; Expected date: 04/12/2023  -     IgG; Future; Expected date: 04/12/2023  -     IgG 1, 2, 3, and 4; Future; Expected date: 04/12/2023  -     Hepatitis C Antibody; Future; Expected date: 04/12/2023  -     Quantiferon Gold TB; Future; Expected date: 04/12/2023  -     Hepatitis B Surface Antigen; Future; Expected date: 04/12/2023  -     Hepatitis B Surface Antibody, Qual/Quant; Future; Expected date: 04/12/2023  -     Hepatitis B Core Antibody, Total; Future; Expected date: 04/12/2023  -     Hepatitis B Surface Ab, Qualitative; Future; Expected date: 04/12/2023  -     Anti-Thyroglobulin Antibody; Future; Expected date: 04/12/2023  -     T4, Free; Future; Expected date: 04/12/2023  -     Thyroid Peroxidase Antibody; Future; Expected date: 04/12/2023  -     TSH; Future; Expected date: 04/12/2023  -     T3, Free; Future; Expected date: 04/12/2023  -     Sedimentation rate; Future; Expected date: 04/12/2023  -     C-Reactive Protein; Future; Expected date: 04/12/2023  -     Comprehensive Metabolic Panel; Future; Expected date: 04/12/2023  -     CBC Auto Differential; Future; Expected date: 04/12/2023  -     MRI Brain Demyelinating Without Contrast; Future; Expected date: 04/12/2023  -     MRI Cervical Spine Demyelinating Without Contrast; Future; Expected date: 04/12/2023  -     HME - OTHER    Abnormal finding on MRI of brain  -     ketorolac injection 60 mg  -     cyanocobalamin injection 1,000 mcg  -     MOTORIZED SCOOTER FOR HOME USE  -     ketorolac (TORADOL) 30 mg/mL (1 mL) injection; Inject 2 mLs (60 mg total) into the vein every 28 days.  Dispense: 2 mL; Refill: 11  -     needle, disp, 25 gauge 25 gauge x 1" Ndle; 1 each by Misc.(Non-Drug; Combo Route) " route daily as needed (shoot).  Dispense: 25 each; Refill: 3  -     syringe, reusable, 3 mL SyrR; 1 Syringe by Misc.(Non-Drug; Combo Route) route once daily.  Dispense: 25 each; Refill: 3  -     semaglutide (OZEMPIC) 0.25 mg or 0.5 mg(2 mg/1.5 mL) pen injector; Inject 0.25 mg into the skin every 7 days. For 4 weeks  Dispense: 1.5 mL; Refill: 0  -     semaglutide (OZEMPIC) 0.25 mg or 0.5 mg(2 mg/1.5 mL) pen injector; Inject 0.5 mg into the skin every 7 days.  Dispense: 1.5 mL; Refill: 11  -     Lymphocyte Profile II; Future; Expected date: 04/12/2023  -     IgA; Future; Expected date: 04/12/2023  -     Humoral Immune Eval (Pneumo Serotypes) With H. Flu; Future; Expected date: 04/12/2023  -     IgM; Future; Expected date: 04/12/2023  -     IgG; Future; Expected date: 04/12/2023  -     IgG 1, 2, 3, and 4; Future; Expected date: 04/12/2023  -     Hepatitis C Antibody; Future; Expected date: 04/12/2023  -     Quantiferon Gold TB; Future; Expected date: 04/12/2023  -     Hepatitis B Surface Antigen; Future; Expected date: 04/12/2023  -     Hepatitis B Surface Antibody, Qual/Quant; Future; Expected date: 04/12/2023  -     Hepatitis B Core Antibody, Total; Future; Expected date: 04/12/2023  -     Hepatitis B Surface Ab, Qualitative; Future; Expected date: 04/12/2023  -     Anti-Thyroglobulin Antibody; Future; Expected date: 04/12/2023  -     T4, Free; Future; Expected date: 04/12/2023  -     Thyroid Peroxidase Antibody; Future; Expected date: 04/12/2023  -     TSH; Future; Expected date: 04/12/2023  -     T3, Free; Future; Expected date: 04/12/2023  -     Sedimentation rate; Future; Expected date: 04/12/2023  -     C-Reactive Protein; Future; Expected date: 04/12/2023  -     Comprehensive Metabolic Panel; Future; Expected date: 04/12/2023  -     CBC Auto Differential; Future; Expected date: 04/12/2023  -     MRI Brain Demyelinating Without Contrast; Future; Expected date: 04/12/2023  -     MRI Cervical Spine Demyelinating  Without Contrast; Future; Expected date: 04/12/2023  -     HME - OTHER    Primary thyroid papillary carcinoma  -     ketorolac injection 60 mg  -     cyanocobalamin injection 1,000 mcg  -     MOTORIZED SCOOTER FOR HOME USE  -     Lymphocyte Profile II; Future; Expected date: 04/12/2023  -     IgA; Future; Expected date: 04/12/2023  -     Humoral Immune Eval (Pneumo Serotypes) With H. Flu; Future; Expected date: 04/12/2023  -     IgM; Future; Expected date: 04/12/2023  -     IgG; Future; Expected date: 04/12/2023  -     IgG 1, 2, 3, and 4; Future; Expected date: 04/12/2023  -     Hepatitis C Antibody; Future; Expected date: 04/12/2023  -     Quantiferon Gold TB; Future; Expected date: 04/12/2023  -     Hepatitis B Surface Antigen; Future; Expected date: 04/12/2023  -     Hepatitis B Surface Antibody, Qual/Quant; Future; Expected date: 04/12/2023  -     Hepatitis B Core Antibody, Total; Future; Expected date: 04/12/2023  -     Hepatitis B Surface Ab, Qualitative; Future; Expected date: 04/12/2023  -     Anti-Thyroglobulin Antibody; Future; Expected date: 04/12/2023  -     T4, Free; Future; Expected date: 04/12/2023  -     Thyroid Peroxidase Antibody; Future; Expected date: 04/12/2023  -     TSH; Future; Expected date: 04/12/2023  -     T3, Free; Future; Expected date: 04/12/2023  -     Sedimentation rate; Future; Expected date: 04/12/2023  -     C-Reactive Protein; Future; Expected date: 04/12/2023  -     Comprehensive Metabolic Panel; Future; Expected date: 04/12/2023  -     CBC Auto Differential; Future; Expected date: 04/12/2023  -     HME - OTHER    History of thyroid cancer  -     ketorolac injection 60 mg  -     cyanocobalamin injection 1,000 mcg  -     MOTORIZED SCOOTER FOR HOME USE  -     Lymphocyte Profile II; Future; Expected date: 04/12/2023  -     IgA; Future; Expected date: 04/12/2023  -     Humoral Immune Eval (Pneumo Serotypes) With H. Flu; Future; Expected date: 04/12/2023  -     IgM; Future; Expected  "date: 04/12/2023  -     IgG; Future; Expected date: 04/12/2023  -     IgG 1, 2, 3, and 4; Future; Expected date: 04/12/2023  -     Hepatitis C Antibody; Future; Expected date: 04/12/2023  -     Quantiferon Gold TB; Future; Expected date: 04/12/2023  -     Hepatitis B Surface Antigen; Future; Expected date: 04/12/2023  -     Hepatitis B Surface Antibody, Qual/Quant; Future; Expected date: 04/12/2023  -     Hepatitis B Core Antibody, Total; Future; Expected date: 04/12/2023  -     Hepatitis B Surface Ab, Qualitative; Future; Expected date: 04/12/2023  -     Anti-Thyroglobulin Antibody; Future; Expected date: 04/12/2023  -     T4, Free; Future; Expected date: 04/12/2023  -     Thyroid Peroxidase Antibody; Future; Expected date: 04/12/2023  -     TSH; Future; Expected date: 04/12/2023  -     T3, Free; Future; Expected date: 04/12/2023  -     Sedimentation rate; Future; Expected date: 04/12/2023  -     C-Reactive Protein; Future; Expected date: 04/12/2023  -     Comprehensive Metabolic Panel; Future; Expected date: 04/12/2023  -     CBC Auto Differential; Future; Expected date: 04/12/2023  -     HME - OTHER    Psoriatic arthritis  -     ketorolac injection 60 mg  -     cyanocobalamin injection 1,000 mcg  -     MOTORIZED SCOOTER FOR HOME USE  -     ketorolac (TORADOL) 30 mg/mL (1 mL) injection; Inject 2 mLs (60 mg total) into the vein every 28 days.  Dispense: 2 mL; Refill: 11  -     needle, disp, 25 gauge 25 gauge x 1" Ndle; 1 each by Misc.(Non-Drug; Combo Route) route daily as needed (shoot).  Dispense: 25 each; Refill: 3  -     syringe, reusable, 3 mL SyrR; 1 Syringe by Misc.(Non-Drug; Combo Route) route once daily.  Dispense: 25 each; Refill: 3  -     semaglutide (OZEMPIC) 0.25 mg or 0.5 mg(2 mg/1.5 mL) pen injector; Inject 0.25 mg into the skin every 7 days. For 4 weeks  Dispense: 1.5 mL; Refill: 0  -     semaglutide (OZEMPIC) 0.25 mg or 0.5 mg(2 mg/1.5 mL) pen injector; Inject 0.5 mg into the skin every 7 days.  " "Dispense: 1.5 mL; Refill: 11  -     Lymphocyte Profile II; Future; Expected date: 04/12/2023  -     IgA; Future; Expected date: 04/12/2023  -     Humoral Immune Eval (Pneumo Serotypes) With H. Flu; Future; Expected date: 04/12/2023  -     IgM; Future; Expected date: 04/12/2023  -     IgG; Future; Expected date: 04/12/2023  -     IgG 1, 2, 3, and 4; Future; Expected date: 04/12/2023  -     Hepatitis C Antibody; Future; Expected date: 04/12/2023  -     Quantiferon Gold TB; Future; Expected date: 04/12/2023  -     Hepatitis B Surface Antigen; Future; Expected date: 04/12/2023  -     Hepatitis B Surface Antibody, Qual/Quant; Future; Expected date: 04/12/2023  -     Hepatitis B Core Antibody, Total; Future; Expected date: 04/12/2023  -     Hepatitis B Surface Ab, Qualitative; Future; Expected date: 04/12/2023  -     Anti-Thyroglobulin Antibody; Future; Expected date: 04/12/2023  -     T4, Free; Future; Expected date: 04/12/2023  -     Thyroid Peroxidase Antibody; Future; Expected date: 04/12/2023  -     TSH; Future; Expected date: 04/12/2023  -     T3, Free; Future; Expected date: 04/12/2023  -     Sedimentation rate; Future; Expected date: 04/12/2023  -     C-Reactive Protein; Future; Expected date: 04/12/2023  -     Comprehensive Metabolic Panel; Future; Expected date: 04/12/2023  -     CBC Auto Differential; Future; Expected date: 04/12/2023  -     HME - OTHER    Other chronic pain  -     MOTORIZED SCOOTER FOR HOME USE  -     ketorolac (TORADOL) 30 mg/mL (1 mL) injection; Inject 2 mLs (60 mg total) into the vein every 28 days.  Dispense: 2 mL; Refill: 11  -     needle, disp, 25 gauge 25 gauge x 1" Ndle; 1 each by Misc.(Non-Drug; Combo Route) route daily as needed (shoot).  Dispense: 25 each; Refill: 3  -     syringe, reusable, 3 mL SyrR; 1 Syringe by Misc.(Non-Drug; Combo Route) route once daily.  Dispense: 25 each; Refill: 3  -     semaglutide (OZEMPIC) 0.25 mg or 0.5 mg(2 mg/1.5 mL) pen injector; Inject 0.25 mg into " "the skin every 7 days. For 4 weeks  Dispense: 1.5 mL; Refill: 0  -     semaglutide (OZEMPIC) 0.25 mg or 0.5 mg(2 mg/1.5 mL) pen injector; Inject 0.5 mg into the skin every 7 days.  Dispense: 1.5 mL; Refill: 11  -     HME - OTHER    Rheumatoid arthritis of multiple sites with negative rheumatoid factor  -     MOTORIZED SCOOTER FOR HOME USE  -     ketorolac (TORADOL) 30 mg/mL (1 mL) injection; Inject 2 mLs (60 mg total) into the vein every 28 days.  Dispense: 2 mL; Refill: 11  -     needle, disp, 25 gauge 25 gauge x 1" Ndle; 1 each by Misc.(Non-Drug; Combo Route) route daily as needed (shoot).  Dispense: 25 each; Refill: 3  -     syringe, reusable, 3 mL SyrR; 1 Syringe by Misc.(Non-Drug; Combo Route) route once daily.  Dispense: 25 each; Refill: 3  -     semaglutide (OZEMPIC) 0.25 mg or 0.5 mg(2 mg/1.5 mL) pen injector; Inject 0.25 mg into the skin every 7 days. For 4 weeks  Dispense: 1.5 mL; Refill: 0  -     semaglutide (OZEMPIC) 0.25 mg or 0.5 mg(2 mg/1.5 mL) pen injector; Inject 0.5 mg into the skin every 7 days.  Dispense: 1.5 mL; Refill: 11  -     Lymphocyte Profile II; Future; Expected date: 04/12/2023  -     IgA; Future; Expected date: 04/12/2023  -     Humoral Immune Eval (Pneumo Serotypes) With H. Flu; Future; Expected date: 04/12/2023  -     IgM; Future; Expected date: 04/12/2023  -     IgG; Future; Expected date: 04/12/2023  -     IgG 1, 2, 3, and 4; Future; Expected date: 04/12/2023  -     Hepatitis C Antibody; Future; Expected date: 04/12/2023  -     Quantiferon Gold TB; Future; Expected date: 04/12/2023  -     Hepatitis B Surface Antigen; Future; Expected date: 04/12/2023  -     Hepatitis B Surface Antibody, Qual/Quant; Future; Expected date: 04/12/2023  -     Hepatitis B Core Antibody, Total; Future; Expected date: 04/12/2023  -     Hepatitis B Surface Ab, Qualitative; Future; Expected date: 04/12/2023  -     Anti-Thyroglobulin Antibody; Future; Expected date: 04/12/2023  -     T4, Free; Future; " Expected date: 04/12/2023  -     Thyroid Peroxidase Antibody; Future; Expected date: 04/12/2023  -     TSH; Future; Expected date: 04/12/2023  -     T3, Free; Future; Expected date: 04/12/2023  -     Sedimentation rate; Future; Expected date: 04/12/2023  -     C-Reactive Protein; Future; Expected date: 04/12/2023  -     Comprehensive Metabolic Panel; Future; Expected date: 04/12/2023  -     CBC Auto Differential; Future; Expected date: 04/12/2023  -     HME - OTHER    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 For 4 weeks  Dispense: 1.5 mL; Refill: 0  -     semaglutide (OZEMPIC) 0.25 mg or 0.5 mg(2 mg/1.5 mL) pen injector; Inject 0.5 mg into the skin every 7 days.  Dispense: 1.5 mL; Refill: 11  -     Lymphocyte Profile II; Future; Expected date: 04/12/2023  -     IgA; Future; Expected date: 04/12/2023  -     Humoral Immune Eval (Pneumo Serotypes) With H. Flu; Future; Expected date: 04/12/2023  -     IgM; Future; Expected date: 04/12/2023  -     IgG; Future; Expected date: 04/12/2023  -     IgG 1, 2, 3, and 4; Future; Expected date: 04/12/2023  -     Hepatitis C Antibody; Future; Expected date: 04/12/2023  -     Quantiferon Gold TB; Future; Expected date: 04/12/2023  -     Hepatitis B Surface Antigen; Future; Expected date: 04/12/2023  -     Hepatitis B Surface Antibody, Qual/Quant; Future; Expected date: 04/12/2023  -     Hepatitis B Core Antibody, Total; Future; Expected date: 04/12/2023  -     Hepatitis B Surface Ab, Qualitative; Future; Expected date: 04/12/2023  -     Anti-Thyroglobulin Antibody; Future; Expected date: 04/12/2023  -     T4, Free; Future; Expected date: 04/12/2023  -     Thyroid Peroxidase Antibody; Future; Expected date: 04/12/2023  -     TSH; Future; Expected date: 04/12/2023  -     T3, Free; Future;  "Expected date: 04/12/2023  -     Sedimentation rate; Future; Expected date: 04/12/2023  -     C-Reactive Protein; Future; Expected date: 04/12/2023  -     Comprehensive Metabolic Panel; Future; Expected date: 04/12/2023  -     CBC Auto Differential; Future; Expected date: 04/12/2023  -     HME - OTHER    Steroid dependent  -     ketorolac (TORADOL) 30 mg/mL (1 mL) injection; Inject 2 mLs (60 mg total) into the vein every 28 days.  Dispense: 2 mL; Refill: 11  -     needle, disp, 25 gauge 25 gauge x 1" Ndle; 1 each by Misc.(Non-Drug; Combo Route) route daily as needed (shoot).  Dispense: 25 each; Refill: 3  -     syringe, reusable, 3 mL SyrR; 1 Syringe by Misc.(Non-Drug; Combo Route) route once daily.  Dispense: 25 each; Refill: 3  -     semaglutide (OZEMPIC) 0.25 mg or 0.5 mg(2 mg/1.5 mL) pen injector; Inject 0.25 mg into the skin every 7 days. For 4 weeks  Dispense: 1.5 mL; Refill: 0  -     semaglutide (OZEMPIC) 0.25 mg or 0.5 mg(2 mg/1.5 mL) pen injector; Inject 0.5 mg into the skin every 7 days.  Dispense: 1.5 mL; Refill: 11  -     DXA Bone Density Axial Skeleton 1 or more sites; Future; Expected date: 04/12/2023  -     HME - OTHER    Insomnia, unspecified type  -     zaleplon (SONATA) 10 MG capsule; Take 1 capsule (10 mg total) by mouth every evening.  Dispense: 30 capsule; Refill: 3  -     HME - OTHER    Other orders  -     acetaminophen tablet 650 mg  -     famotidine (PF) injection 20 mg  -     diphenhydrAMINE injection 25 mg  -     Immune Globulin G (IGG)-PRO-IGA 10 % injection (Privigen) 10 % injection 65 g  -     sodium chloride 0.9% 250 mL flush bag  -     sodium chloride 0.9% flush 10 mL  -     heparin, porcine (PF) 100 unit/mL injection flush 500 Units  -     alteplase injection 2 mg          1.  Cvid  2. Start IVIG  3 toradol at home for pain  4. Pt has not been seen x 2 yrs    More than 50% of the 68 minute encounter was spent face to face counseling the patient regarding current status and future plan " of care as well as side of the medications. All questions were answered to patient's satisfaction

## 2023-04-12 NOTE — PROGRESS NOTES
2 pt identifiers used  Allergies reviewed      Administered 2 cc ( 30 mg/ml ) of toradol to the right upper outer gluteal. Patient tolerated injections well. Informed of s/s to report verbalized understanding. No adverse reactions noted. Left facility in stable condition.    Administered 1 cc ( 1000 mcg/ml ) of b12 to the right ventrogluteal. Informed of s/s to report verbalized understanding. No adverse reactions noted.      Answers submitted by the patient for this visit:  Rheumatology Questionnaire (Submitted on 4/12/2023)  fever: No  eye redness: No  mouth sores: Yes  headaches: Yes  shortness of breath: Yes  chest pain: Yes  trouble swallowing: Yes  diarrhea: No  constipation: Yes  unexpected weight change: No  genital sore: No  dysuria: Yes  During the last 3 days, have you had a skin rash?: No  Bruises or bleeds easily: Yes  cough: Yes

## 2023-04-27 DIAGNOSIS — F41.9 ANXIETY: ICD-10-CM

## 2023-04-27 DIAGNOSIS — R60.9 EDEMA, UNSPECIFIED TYPE: ICD-10-CM

## 2023-04-27 RX ORDER — BUPROPION HYDROCHLORIDE 150 MG/1
150 TABLET, EXTENDED RELEASE ORAL 2 TIMES DAILY
Qty: 60 TABLET | Refills: 0 | Status: SHIPPED | OUTPATIENT
Start: 2023-04-27 | End: 2023-11-03 | Stop reason: SDUPTHER

## 2023-04-27 RX ORDER — FUROSEMIDE 20 MG/1
20 TABLET ORAL DAILY PRN
Qty: 20 TABLET | Refills: 0 | Status: SHIPPED | OUTPATIENT
Start: 2023-04-27 | End: 2023-11-03 | Stop reason: SDUPTHER

## 2023-04-28 ENCOUNTER — TELEPHONE (OUTPATIENT)
Dept: RHEUMATOLOGY | Facility: CLINIC | Age: 53
End: 2023-04-28
Payer: COMMERCIAL

## 2023-04-28 ENCOUNTER — PATIENT MESSAGE (OUTPATIENT)
Dept: RHEUMATOLOGY | Facility: CLINIC | Age: 53
End: 2023-04-28
Payer: COMMERCIAL

## 2023-04-28 NOTE — TELEPHONE ENCOUNTER
Reached out to pre-service rep, Sunday FOSTER. Stated that insurance needs CRP, IgG, CMP, MRI of C-spine, and MRI of L-spine to submit PA for IVIG    Labs previously ordered. Sent [facility] msg to pt to obtain needed labs and MRI at her earliest convenience.

## 2023-04-28 NOTE — TELEPHONE ENCOUNTER
----- Message -----   From: Sunday Iqbal LPN   Sent: 4/25/2023   8:47 AM CDT   To: Tash [facility] Staff   Subject: Clinical Info Needed                             Good Morning,     I received referral for [employer] to process.   In order to submit for authorization - current labs and MRI needed.       Please advise.     Thank you,     Sunday Iqbal LPN   Preservice

## 2023-05-03 NOTE — TELEPHONE ENCOUNTER
Sent pt montrellt msg about needed labs and MRI but pt has not read msg.    Staff,  Please call pt to let her know that labs (CRP, IgG, CMP) and MRI (MRI of C-spine and MRI of L-spine) are ordered and need to be completed at her earliest convenience in order for the pre-service dept to be able to submit the PA for her IVIG. Thanks!

## 2023-05-22 ENCOUNTER — TELEPHONE (OUTPATIENT)
Dept: RHEUMATOLOGY | Facility: CLINIC | Age: 53
End: 2023-05-22

## 2023-05-22 NOTE — TELEPHONE ENCOUNTER
Attempted to contact patient in regards to testing needed for PA of IVIG. Left a message to contact office.     The pre-service department is working on the authorization for your [employer] (IVIG). In order for them to submit the prior authorization for the infusion, labs and a MRI is needed. The labs and MRI (of the C-spine and L-spine) are ordered. Please complete them at your earliest convenience at any [facility] facility.

## 2023-05-23 NOTE — TELEPHONE ENCOUNTER
Attempted to contact patient in regards to testing needed to get IVIG approved. Left a message to contact office.

## 2023-05-30 ENCOUNTER — PATIENT MESSAGE (OUTPATIENT)
Dept: INFUSION THERAPY | Facility: HOSPITAL | Age: 53
End: 2023-05-30

## 2023-05-30 ENCOUNTER — TELEPHONE (OUTPATIENT)
Dept: INFUSION THERAPY | Facility: HOSPITAL | Age: 53
End: 2023-05-30
Payer: COMMERCIAL

## 2023-06-12 ENCOUNTER — TELEPHONE (OUTPATIENT)
Dept: RHEUMATOLOGY | Facility: CLINIC | Age: 53
End: 2023-06-12
Payer: COMMERCIAL

## 2023-06-12 NOTE — TELEPHONE ENCOUNTER
----- Message from Ritika Vargas sent at 6/9/2023  3:50 PM CDT -----  Call patient several times for IVIG   [phone] LVM 5/17/2023  9:29 AM Sam, Ritika    [phone] LVM 5/18/2023 12:28 PM Sam, Ritika    [phone] LVM 5/19/2023  1:31 PM Sam, Ritika    [phone] LVM 5/22/2023  3:36 PM Sam, Ritika    [phone] LVM 5/23/2023 10:22 AM Sam, Ritika    [phone] VM Full 5/24/2023  1:26 PM Sam, Ritika    [phone] LVM 5/25/2023 11:07 AM Sam, Ritika    [phone] LVM 6/5/2023  3:49 PM Sam, Ritika    [phone] LVM 6/6/2023  2:00 PM Sam, Ritika    [phone] VM FULL 6/8/2023  1:25 PM Sam, Ritika    [phone] LVM                6/9/2023 3:50 PM Sam, Ritika

## 2023-07-03 ENCOUNTER — TELEPHONE (OUTPATIENT)
Dept: PHARMACY | Facility: CLINIC | Age: 53
End: 2023-07-03
Payer: COMMERCIAL

## 2023-07-21 ENCOUNTER — PATIENT MESSAGE (OUTPATIENT)
Dept: PSYCHIATRY | Facility: CLINIC | Age: 53
End: 2023-07-21
Payer: COMMERCIAL

## 2023-10-11 PROBLEM — I70.0 AORTIC CALCIFICATION: Status: ACTIVE | Noted: 2023-10-11

## 2023-11-03 DIAGNOSIS — M06.09 RHEUMATOID ARTHRITIS OF MULTIPLE SITES WITH NEGATIVE RHEUMATOID FACTOR: ICD-10-CM

## 2023-11-03 DIAGNOSIS — F41.9 ANXIETY: ICD-10-CM

## 2023-11-03 DIAGNOSIS — G35 MS (MULTIPLE SCLEROSIS): ICD-10-CM

## 2023-11-03 DIAGNOSIS — D50.8 OTHER IRON DEFICIENCY ANEMIA: ICD-10-CM

## 2023-11-03 DIAGNOSIS — G35 OPTIC NEURITIS DUE TO MULTIPLE SCLEROSIS: ICD-10-CM

## 2023-11-03 DIAGNOSIS — L40.50 PSA (PSORIATIC ARTHRITIS): ICD-10-CM

## 2023-11-03 DIAGNOSIS — H46.9 OPTIC NEURITIS DUE TO MULTIPLE SCLEROSIS: ICD-10-CM

## 2023-11-03 DIAGNOSIS — G25.81 RESTLESS LEGS: ICD-10-CM

## 2023-11-03 DIAGNOSIS — R60.9 EDEMA, UNSPECIFIED TYPE: ICD-10-CM

## 2023-11-03 RX ORDER — ALPRAZOLAM 1 MG/1
1 TABLET ORAL 2 TIMES DAILY
Qty: 60 TABLET | Refills: 3 | OUTPATIENT
Start: 2023-11-03

## 2023-11-03 RX ORDER — ALPRAZOLAM 1 MG/1
1 TABLET ORAL 2 TIMES DAILY
Qty: 60 TABLET | Refills: 3 | Status: CANCELLED | OUTPATIENT
Start: 2023-11-03

## 2023-11-03 RX ORDER — FOLIC ACID 1 MG/1
1000 TABLET ORAL DAILY
Qty: 30 TABLET | Refills: 3 | Status: CANCELLED | OUTPATIENT
Start: 2023-11-03

## 2023-11-03 RX ORDER — FOLIC ACID 1 MG/1
1000 TABLET ORAL DAILY
Qty: 30 TABLET | Refills: 3 | OUTPATIENT
Start: 2023-11-03

## 2023-11-03 RX ORDER — BUPROPION HYDROCHLORIDE 150 MG/1
150 TABLET, EXTENDED RELEASE ORAL 2 TIMES DAILY
Qty: 60 TABLET | Refills: 0 | Status: SHIPPED | OUTPATIENT
Start: 2023-11-03 | End: 2023-11-28 | Stop reason: SDUPTHER

## 2023-11-03 RX ORDER — FUROSEMIDE 20 MG/1
20 TABLET ORAL DAILY PRN
Qty: 20 TABLET | Refills: 0 | Status: SHIPPED | OUTPATIENT
Start: 2023-11-03 | End: 2023-11-28 | Stop reason: SDUPTHER

## 2023-11-28 DIAGNOSIS — F41.9 ANXIETY: ICD-10-CM

## 2023-11-28 DIAGNOSIS — E53.8 B12 DEFICIENCY: ICD-10-CM

## 2023-11-28 DIAGNOSIS — L40.50 PSA (PSORIATIC ARTHRITIS): ICD-10-CM

## 2023-11-28 DIAGNOSIS — R60.9 EDEMA, UNSPECIFIED TYPE: ICD-10-CM

## 2023-11-28 RX ORDER — FOLIC ACID 1 MG/1
1000 TABLET ORAL DAILY
Qty: 30 TABLET | Refills: 3 | Status: SHIPPED | OUTPATIENT
Start: 2023-11-28

## 2023-11-28 RX ORDER — FUROSEMIDE 20 MG/1
20 TABLET ORAL DAILY PRN
Qty: 20 TABLET | Refills: 6 | Status: SHIPPED | OUTPATIENT
Start: 2023-11-28 | End: 2024-11-27

## 2023-11-28 RX ORDER — BUPROPION HYDROCHLORIDE 150 MG/1
150 TABLET, EXTENDED RELEASE ORAL 2 TIMES DAILY
Qty: 60 TABLET | Refills: 6 | Status: SHIPPED | OUTPATIENT
Start: 2023-11-28

## 2023-11-28 RX ORDER — CYANOCOBALAMIN 1000 UG/ML
1000 INJECTION, SOLUTION INTRAMUSCULAR; SUBCUTANEOUS
Qty: 10 ML | Refills: 0 | Status: SHIPPED | OUTPATIENT
Start: 2023-11-28

## 2023-12-26 DIAGNOSIS — J40 BRONCHITIS: ICD-10-CM

## 2023-12-26 RX ORDER — ALBUTEROL SULFATE 0.83 MG/ML
2.5 SOLUTION RESPIRATORY (INHALATION) EVERY 4 HOURS PRN
Qty: 30 EACH | Refills: 2 | Status: ON HOLD | OUTPATIENT
Start: 2023-12-26 | End: 2024-01-25 | Stop reason: HOSPADM

## 2024-01-22 ENCOUNTER — PATIENT MESSAGE (OUTPATIENT)
Dept: PSYCHIATRY | Facility: CLINIC | Age: 54
End: 2024-01-22
Payer: COMMERCIAL

## 2024-01-25 PROBLEM — D72.829 LEUKOCYTOSIS: Status: ACTIVE | Noted: 2024-01-25

## 2024-01-25 PROBLEM — R06.02 SOB (SHORTNESS OF BREATH): Status: ACTIVE | Noted: 2024-01-25

## 2024-02-26 PROBLEM — J40 BRONCHITIS: Status: ACTIVE | Noted: 2024-02-26

## 2024-02-26 PROBLEM — J01.90 ACUTE SINUSITIS: Status: ACTIVE | Noted: 2024-02-26

## 2024-04-05 ENCOUNTER — TELEPHONE (OUTPATIENT)
Dept: RHEUMATOLOGY | Facility: CLINIC | Age: 54
End: 2024-04-05
Payer: COMMERCIAL

## 2024-04-05 NOTE — TELEPHONE ENCOUNTER
Patient last had appointment 4/12/2023 with Dr. Bianchi. Was to f/u in 4 months but no showed on 8/14/2023 and rescheduled appointment on 12/13/2023 was cancelled    Patient has appointment with Dr. Bianchi 9/5/24 but may need a sooner appointment    Staff,  Please reach out to patient to schedule patient for a f/u appointment with me or Bisi Felton PA-C. Thanks!

## 2024-04-09 PROBLEM — Z79.899 PATIENT TAKING UNKNOWN MEDICATIONS: Status: ACTIVE | Noted: 2024-04-09

## 2024-04-09 PROBLEM — J45.901 ASTHMA EXACERBATION: Status: ACTIVE | Noted: 2024-04-09
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
## 2024-04-09 PROBLEM — E66.01 MORBID OBESITY: Status: ACTIVE | Noted: 2018-06-19

## 2024-04-09 PROBLEM — E66.9 RESTRICTIVE LUNG DISEASE SECONDARY TO OBESITY: Status: ACTIVE | Noted: 2024-04-09

## 2024-04-09 PROBLEM — J98.4 RESTRICTIVE LUNG DISEASE SECONDARY TO OBESITY: Status: ACTIVE | Noted: 2024-04-09

## 2024-04-09 PROBLEM — R60.0 BILATERAL LOWER EXTREMITY EDEMA: Status: ACTIVE | Noted: 2024-04-09

## 2024-04-09 PROBLEM — E03.9 HYPOTHYROIDISM: Status: ACTIVE | Noted: 2024-04-09

## 2024-04-09 PROBLEM — E87.1 HYPONATREMIA: Status: ACTIVE | Noted: 2024-04-09

## 2024-04-09 PROBLEM — G47.33 OSA (OBSTRUCTIVE SLEEP APNEA): Status: ACTIVE | Noted: 2024-04-09

## 2024-04-10 PROBLEM — J98.4 RESTRICTIVE LUNG DISEASE SECONDARY TO OBESITY: Status: RESOLVED | Noted: 2024-04-09 | Resolved: 2024-04-10

## 2024-04-10 PROBLEM — E66.9 RESTRICTIVE LUNG DISEASE SECONDARY TO OBESITY: Status: RESOLVED | Noted: 2024-04-09 | Resolved: 2024-04-10

## 2024-04-10 PROBLEM — E87.1 HYPONATREMIA: Status: RESOLVED | Noted: 2024-04-09 | Resolved: 2024-04-10

## 2024-04-10 PROBLEM — Z79.899 PATIENT TAKING UNKNOWN MEDICATIONS: Status: RESOLVED | Noted: 2024-04-09 | Resolved: 2024-04-10

## 2024-04-12 ENCOUNTER — PATIENT OUTREACH (OUTPATIENT)
Dept: RHEUMATOLOGY | Facility: CLINIC | Age: 54
End: 2024-04-12
Payer: COMMERCIAL

## 2024-04-12 ENCOUNTER — PATIENT MESSAGE (OUTPATIENT)
Dept: RHEUMATOLOGY | Facility: CLINIC | Age: 54
End: 2024-04-12
Payer: COMMERCIAL

## 2024-04-12 DIAGNOSIS — G35 MS (MULTIPLE SCLEROSIS): ICD-10-CM

## 2024-04-12 DIAGNOSIS — G89.29 OTHER CHRONIC PAIN: ICD-10-CM

## 2024-04-12 DIAGNOSIS — F19.20 STEROID DEPENDENT: ICD-10-CM

## 2024-04-12 DIAGNOSIS — M06.09 RHEUMATOID ARTHRITIS OF MULTIPLE SITES WITH NEGATIVE RHEUMATOID FACTOR: ICD-10-CM

## 2024-04-12 DIAGNOSIS — R90.89 ABNORMAL FINDING ON MRI OF BRAIN: ICD-10-CM

## 2024-04-12 DIAGNOSIS — L40.50 PSORIATIC ARTHRITIS: ICD-10-CM

## 2024-04-12 NOTE — PROGRESS NOTES
[facility] reached out for pt refill of xanax. She has not been see x 1 yr her next appointment

## 2024-04-15 ENCOUNTER — LAB VISIT (OUTPATIENT)
Dept: LAB | Facility: HOSPITAL | Age: 54
End: 2024-04-15
Attending: STUDENT IN AN ORGANIZED HEALTH CARE EDUCATION/TRAINING PROGRAM
Payer: COMMERCIAL

## 2024-04-15 DIAGNOSIS — D50.0 IRON DEFICIENCY ANEMIA SECONDARY TO BLOOD LOSS (CHRONIC): Primary | ICD-10-CM

## 2024-04-15 PROCEDURE — 80048 BASIC METABOLIC PNL TOTAL CA: CPT | Performed by: STUDENT IN AN ORGANIZED HEALTH CARE EDUCATION/TRAINING PROGRAM

## 2024-04-15 PROCEDURE — 85025 COMPLETE CBC W/AUTO DIFF WBC: CPT | Performed by: STUDENT IN AN ORGANIZED HEALTH CARE EDUCATION/TRAINING PROGRAM

## 2024-04-15 RX ORDER — NEEDLES, DISPOSABLE 25GX5/8"
1 NEEDLE, DISPOSABLE MISCELLANEOUS DAILY PRN
Qty: 25 EACH | Refills: 3 | OUTPATIENT
Start: 2024-04-15

## 2024-04-15 RX ORDER — SYRINGE REUSABLE, 3 ML
1 SYRINGE, REUSABLE MISCELLANEOUS DAILY
Qty: 25 EACH | Refills: 3 | OUTPATIENT
Start: 2024-04-15

## 2024-04-15 RX ORDER — KETOROLAC TROMETHAMINE 30 MG/ML
60 INJECTION, SOLUTION INTRAMUSCULAR; INTRAVENOUS
Qty: 2 ML | Refills: 11 | OUTPATIENT
Start: 2024-04-15 | End: 2025-04-15

## 2024-04-16 LAB
ANION GAP SERPL CALC-SCNC: 9 MMOL/L (ref 8–16)
BASOPHILS # BLD AUTO: 0.01 K/UL (ref 0–0.2)
BASOPHILS NFR BLD: 0.1 % (ref 0–1.9)
BUN SERPL-MCNC: 14 MG/DL (ref 6–20)
CALCIUM SERPL-MCNC: 8.7 MG/DL (ref 8.7–10.5)
CHLORIDE SERPL-SCNC: 103 MMOL/L (ref 95–110)
CO2 SERPL-SCNC: 26 MMOL/L (ref 23–29)
CREAT SERPL-MCNC: 0.8 MG/DL (ref 0.5–1.4)
DIFFERENTIAL METHOD BLD: ABNORMAL
EOSINOPHIL # BLD AUTO: 0.3 K/UL (ref 0–0.5)
EOSINOPHIL NFR BLD: 4.6 % (ref 0–8)
ERYTHROCYTE [DISTWIDTH] IN BLOOD BY AUTOMATED COUNT: 19.9 % (ref 11.5–14.5)
EST. GFR  (NO RACE VARIABLE): >60 ML/MIN/1.73 M^2
GLUCOSE SERPL-MCNC: 63 MG/DL (ref 70–110)
HCT VFR BLD AUTO: 34.9 % (ref 37–48.5)
HGB BLD-MCNC: 9.7 G/DL (ref 12–16)
IMM GRANULOCYTES # BLD AUTO: 0.03 K/UL (ref 0–0.04)
IMM GRANULOCYTES NFR BLD AUTO: 0.4 % (ref 0–0.5)
LYMPHOCYTES # BLD AUTO: 1.6 K/UL (ref 1–4.8)
LYMPHOCYTES NFR BLD: 23 % (ref 18–48)
MCH RBC QN AUTO: 20 PG (ref 27–31)
MCHC RBC AUTO-ENTMCNC: 27.8 G/DL (ref 32–36)
MCV RBC AUTO: 72 FL (ref 82–98)
MONOCYTES # BLD AUTO: 0.5 K/UL (ref 0.3–1)
MONOCYTES NFR BLD: 6.5 % (ref 4–15)
NEUTROPHILS # BLD AUTO: 4.6 K/UL (ref 1.8–7.7)
NEUTROPHILS NFR BLD: 65.4 % (ref 38–73)
NRBC BLD-RTO: 0 /100 WBC
PLATELET # BLD AUTO: 442 K/UL (ref 150–450)
PMV BLD AUTO: 9.8 FL (ref 9.2–12.9)
POTASSIUM SERPL-SCNC: 3.9 MMOL/L (ref 3.5–5.1)
RBC # BLD AUTO: 4.84 M/UL (ref 4–5.4)
SODIUM SERPL-SCNC: 138 MMOL/L (ref 136–145)
WBC # BLD AUTO: 6.96 K/UL (ref 3.9–12.7)

## 2024-05-02 ENCOUNTER — PATIENT MESSAGE (OUTPATIENT)
Dept: PSYCHIATRY | Facility: CLINIC | Age: 54
End: 2024-05-02
Payer: COMMERCIAL

## 2024-05-27 PROBLEM — J01.90 ACUTE SINUSITIS: Status: RESOLVED | Noted: 2024-02-26 | Resolved: 2024-05-27

## 2024-07-25 ENCOUNTER — PATIENT MESSAGE (OUTPATIENT)
Dept: PSYCHIATRY | Facility: CLINIC | Age: 54
End: 2024-07-25
Payer: COMMERCIAL

## 2024-08-05 ENCOUNTER — PATIENT MESSAGE (OUTPATIENT)
Dept: PSYCHIATRY | Facility: CLINIC | Age: 54
End: 2024-08-05
Payer: COMMERCIAL

## 2024-08-05 DIAGNOSIS — L40.50 PSA (PSORIATIC ARTHRITIS): ICD-10-CM

## 2024-08-05 RX ORDER — FOLIC ACID 1 MG/1
1000 TABLET ORAL DAILY
Qty: 30 TABLET | Refills: 3 | Status: SHIPPED | OUTPATIENT
Start: 2024-08-05

## 2024-08-19 DIAGNOSIS — R90.89 ABNORMAL FINDING ON MRI OF BRAIN: ICD-10-CM

## 2024-08-19 DIAGNOSIS — F19.20 STEROID DEPENDENT: ICD-10-CM

## 2024-08-19 DIAGNOSIS — G89.29 OTHER CHRONIC PAIN: ICD-10-CM

## 2024-08-19 DIAGNOSIS — G35 MS (MULTIPLE SCLEROSIS): ICD-10-CM

## 2024-08-19 DIAGNOSIS — L40.50 PSORIATIC ARTHRITIS: ICD-10-CM

## 2024-08-19 DIAGNOSIS — M06.09 RHEUMATOID ARTHRITIS OF MULTIPLE SITES WITH NEGATIVE RHEUMATOID FACTOR: ICD-10-CM

## 2024-08-19 RX ORDER — SYRINGE REUSABLE, 3 ML
1 SYRINGE, REUSABLE MISCELLANEOUS DAILY
Qty: 25 EACH | Refills: 3 | Status: CANCELLED | OUTPATIENT
Start: 2024-08-19

## 2024-08-19 RX ORDER — NEEDLES, DISPOSABLE 25GX5/8"
1 NEEDLE, DISPOSABLE MISCELLANEOUS DAILY PRN
Qty: 25 EACH | Refills: 3 | Status: CANCELLED | OUTPATIENT
Start: 2024-08-19

## 2024-10-11 DIAGNOSIS — M06.09 RHEUMATOID ARTHRITIS OF MULTIPLE SITES WITH NEGATIVE RHEUMATOID FACTOR: ICD-10-CM

## 2024-10-11 DIAGNOSIS — R90.89 ABNORMAL FINDING ON MRI OF BRAIN: ICD-10-CM

## 2024-10-11 DIAGNOSIS — E09.69 DRUG OR CHEMICAL INDUCED DIABETES MELLITUS WITH OTHER SPECIFIED COMPLICATION, UNSPECIFIED WHETHER LONG TERM INSULIN USE: ICD-10-CM

## 2024-10-11 DIAGNOSIS — L40.50 PSORIATIC ARTHRITIS: ICD-10-CM

## 2024-10-11 DIAGNOSIS — G35 MS (MULTIPLE SCLEROSIS): ICD-10-CM

## 2024-10-11 DIAGNOSIS — G89.29 OTHER CHRONIC PAIN: ICD-10-CM

## 2024-10-11 DIAGNOSIS — F19.20 STEROID DEPENDENT: ICD-10-CM

## 2024-10-11 RX ORDER — SYRINGE REUSABLE, 3 ML
1 SYRINGE, REUSABLE MISCELLANEOUS DAILY
Qty: 25 EACH | Refills: 3 | Status: CANCELLED | OUTPATIENT
Start: 2024-10-11

## 2024-10-11 RX ORDER — NEEDLES, DISPOSABLE 25GX5/8"
1 NEEDLE, DISPOSABLE MISCELLANEOUS DAILY PRN
Qty: 25 EACH | Refills: 3 | Status: CANCELLED | OUTPATIENT
Start: 2024-10-11

## 2024-10-14 RX ORDER — SEMAGLUTIDE 0.68 MG/ML
0.25 INJECTION, SOLUTION SUBCUTANEOUS
Qty: 3 ML | Refills: 0 | Status: SHIPPED | OUTPATIENT
Start: 2024-10-14 | End: 2024-11-26

## 2024-10-14 RX ORDER — KETOROLAC TROMETHAMINE 30 MG/ML
60 INJECTION, SOLUTION INTRAMUSCULAR; INTRAVENOUS
Qty: 2 ML | Refills: 11 | Status: SHIPPED | OUTPATIENT
Start: 2024-10-14 | End: 2025-10-14

## 2024-11-04 PROBLEM — M79.10 MYALGIA: Status: ACTIVE | Noted: 2024-01-25

## 2024-11-19 DIAGNOSIS — E09.69 DRUG OR CHEMICAL INDUCED DIABETES MELLITUS WITH OTHER SPECIFIED COMPLICATION, UNSPECIFIED WHETHER LONG TERM INSULIN USE: ICD-10-CM

## 2024-11-19 DIAGNOSIS — G35 MS (MULTIPLE SCLEROSIS): ICD-10-CM

## 2024-11-19 DIAGNOSIS — F19.20 STEROID DEPENDENT: ICD-10-CM

## 2024-11-19 DIAGNOSIS — G89.29 OTHER CHRONIC PAIN: ICD-10-CM

## 2024-11-19 DIAGNOSIS — F41.9 ANXIETY: ICD-10-CM

## 2024-11-19 DIAGNOSIS — R90.89 ABNORMAL FINDING ON MRI OF BRAIN: ICD-10-CM

## 2024-11-19 DIAGNOSIS — M06.09 RHEUMATOID ARTHRITIS OF MULTIPLE SITES WITH NEGATIVE RHEUMATOID FACTOR: ICD-10-CM

## 2024-11-19 DIAGNOSIS — L40.50 PSORIATIC ARTHRITIS: ICD-10-CM

## 2024-11-19 RX ORDER — BUPROPION HYDROCHLORIDE 150 MG/1
150 TABLET, EXTENDED RELEASE ORAL 2 TIMES DAILY
Qty: 60 TABLET | Refills: 6 | Status: SHIPPED | OUTPATIENT
Start: 2024-11-19

## 2024-11-19 RX ORDER — NEEDLES, DISPOSABLE 25GX5/8"
1 NEEDLE, DISPOSABLE MISCELLANEOUS DAILY PRN
Qty: 25 EACH | Refills: 3 | Status: CANCELLED | OUTPATIENT
Start: 2024-11-19

## 2024-11-19 RX ORDER — SEMAGLUTIDE 0.68 MG/ML
0.25 INJECTION, SOLUTION SUBCUTANEOUS
Qty: 3 ML | Refills: 0 | Status: CANCELLED | OUTPATIENT
Start: 2024-11-19 | End: 2024-12-19

## 2024-11-21 RX ORDER — SEMAGLUTIDE 0.68 MG/ML
0.25 INJECTION, SOLUTION SUBCUTANEOUS
Qty: 3 ML | Refills: 0 | Status: SHIPPED | OUTPATIENT
Start: 2024-11-21 | End: 2024-12-21

## 2024-11-21 RX ORDER — SYRINGE REUSABLE, 3 ML
1 SYRINGE, REUSABLE MISCELLANEOUS DAILY
Qty: 25 EACH | Refills: 3 | Status: SHIPPED | OUTPATIENT
Start: 2024-11-21

## 2024-12-16 ENCOUNTER — PATIENT MESSAGE (OUTPATIENT)
Dept: PSYCHIATRY | Facility: CLINIC | Age: 54
End: 2024-12-16
Payer: COMMERCIAL

## 2024-12-19 DIAGNOSIS — L40.50 PSA (PSORIATIC ARTHRITIS): ICD-10-CM

## 2024-12-23 RX ORDER — FOLIC ACID 1 MG/1
1000 TABLET ORAL DAILY
Qty: 30 TABLET | Refills: 3 | Status: SHIPPED | OUTPATIENT
Start: 2024-12-23

## 2025-03-07 ENCOUNTER — PATIENT MESSAGE (OUTPATIENT)
Dept: PSYCHIATRY | Facility: CLINIC | Age: 55
End: 2025-03-07
Payer: COMMERCIAL

## 2025-05-13 ENCOUNTER — PATIENT MESSAGE (OUTPATIENT)
Dept: PSYCHIATRY | Facility: CLINIC | Age: 55
End: 2025-05-13
Payer: COMMERCIAL

## 2025-06-19 ENCOUNTER — PATIENT MESSAGE (OUTPATIENT)
Dept: PSYCHIATRY | Facility: CLINIC | Age: 55
End: 2025-06-19
Payer: COMMERCIAL

## 2025-07-30 ENCOUNTER — PATIENT MESSAGE (OUTPATIENT)
Dept: PSYCHIATRY | Facility: CLINIC | Age: 55
End: 2025-07-30
Payer: COMMERCIAL

## 2025-08-01 ENCOUNTER — PATIENT MESSAGE (OUTPATIENT)
Dept: PSYCHIATRY | Facility: CLINIC | Age: 55
End: 2025-08-01
Payer: COMMERCIAL